# Patient Record
Sex: FEMALE | NOT HISPANIC OR LATINO
[De-identification: names, ages, dates, MRNs, and addresses within clinical notes are randomized per-mention and may not be internally consistent; named-entity substitution may affect disease eponyms.]

---

## 2017-01-17 ENCOUNTER — APPOINTMENT (OUTPATIENT)
Dept: CARDIOLOGY | Facility: CLINIC | Age: 77
End: 2017-01-17

## 2017-01-17 ENCOUNTER — NON-APPOINTMENT (OUTPATIENT)
Age: 77
End: 2017-01-17

## 2017-01-17 VITALS
HEIGHT: 72 IN | SYSTOLIC BLOOD PRESSURE: 144 MMHG | OXYGEN SATURATION: 97 % | HEART RATE: 56 BPM | DIASTOLIC BLOOD PRESSURE: 80 MMHG | BODY MASS INDEX: 21.4 KG/M2 | WEIGHT: 158 LBS

## 2017-01-17 VITALS — SYSTOLIC BLOOD PRESSURE: 138 MMHG | DIASTOLIC BLOOD PRESSURE: 78 MMHG

## 2017-01-18 LAB
25(OH)D3 SERPL-MCNC: 28.8 NG/ML
ANION GAP SERPL CALC-SCNC: 19 MMOL/L
BUN SERPL-MCNC: 16 MG/DL
CALCIUM SERPL-MCNC: 9.8 MG/DL
CHLORIDE SERPL-SCNC: 103 MMOL/L
CHOLEST SERPL-MCNC: 177 MG/DL
CHOLEST/HDLC SERPL: 4 RATIO
CO2 SERPL-SCNC: 20 MMOL/L
CREAT SERPL-MCNC: 0.87 MG/DL
GLUCOSE SERPL-MCNC: 100 MG/DL
HDLC SERPL-MCNC: 44 MG/DL
LDLC SERPL CALC-MCNC: 104 MG/DL
POTASSIUM SERPL-SCNC: 4.4 MMOL/L
SODIUM SERPL-SCNC: 142 MMOL/L
TRIGL SERPL-MCNC: 143 MG/DL
TSH SERPL-ACNC: 0.74 UIU/ML

## 2017-01-19 ENCOUNTER — MEDICATION RENEWAL (OUTPATIENT)
Age: 77
End: 2017-01-19

## 2017-02-11 ENCOUNTER — TRANSCRIPTION ENCOUNTER (OUTPATIENT)
Age: 77
End: 2017-02-11

## 2017-06-29 ENCOUNTER — MEDICATION RENEWAL (OUTPATIENT)
Age: 77
End: 2017-06-29

## 2017-08-08 ENCOUNTER — RESULT REVIEW (OUTPATIENT)
Age: 77
End: 2017-08-08

## 2018-08-16 ENCOUNTER — APPOINTMENT (OUTPATIENT)
Dept: CARDIOLOGY | Facility: CLINIC | Age: 78
End: 2018-08-16
Payer: MEDICARE

## 2018-08-16 ENCOUNTER — NON-APPOINTMENT (OUTPATIENT)
Age: 78
End: 2018-08-16

## 2018-08-16 VITALS
BODY MASS INDEX: 31.22 KG/M2 | DIASTOLIC BLOOD PRESSURE: 83 MMHG | SYSTOLIC BLOOD PRESSURE: 169 MMHG | HEIGHT: 60 IN | HEART RATE: 77 BPM | WEIGHT: 159 LBS | OXYGEN SATURATION: 98 %

## 2018-08-16 PROCEDURE — 99214 OFFICE O/P EST MOD 30 MIN: CPT

## 2018-08-16 PROCEDURE — 93000 ELECTROCARDIOGRAM COMPLETE: CPT

## 2018-09-10 ENCOUNTER — APPOINTMENT (OUTPATIENT)
Dept: CARDIOLOGY | Facility: CLINIC | Age: 78
End: 2018-09-10
Payer: MEDICARE

## 2018-09-10 PROCEDURE — 93306 TTE W/DOPPLER COMPLETE: CPT

## 2018-09-11 LAB
ANION GAP SERPL CALC-SCNC: 15 MMOL/L
BUN SERPL-MCNC: 15 MG/DL
CALCIUM SERPL-MCNC: 9.6 MG/DL
CHLORIDE SERPL-SCNC: 104 MMOL/L
CHOLEST SERPL-MCNC: 184 MG/DL
CHOLEST/HDLC SERPL: 4.5 RATIO
CO2 SERPL-SCNC: 24 MMOL/L
CREAT SERPL-MCNC: 0.62 MG/DL
GLUCOSE SERPL-MCNC: 115 MG/DL
HDLC SERPL-MCNC: 41 MG/DL
LDLC SERPL CALC-MCNC: 117 MG/DL
POTASSIUM SERPL-SCNC: 3.8 MMOL/L
SODIUM SERPL-SCNC: 143 MMOL/L
TRIGL SERPL-MCNC: 128 MG/DL

## 2018-11-13 LAB
CHOLEST SERPL-MCNC: 161 MG/DL
CHOLEST/HDLC SERPL: 3.8 RATIO
HDLC SERPL-MCNC: 42 MG/DL
LDLC SERPL CALC-MCNC: 94 MG/DL
TRIGL SERPL-MCNC: 125 MG/DL

## 2018-11-15 ENCOUNTER — APPOINTMENT (OUTPATIENT)
Dept: CARDIOLOGY | Facility: CLINIC | Age: 78
End: 2018-11-15
Payer: MEDICARE

## 2018-11-15 ENCOUNTER — NON-APPOINTMENT (OUTPATIENT)
Age: 78
End: 2018-11-15

## 2018-11-15 VITALS
DIASTOLIC BLOOD PRESSURE: 80 MMHG | HEIGHT: 60 IN | OXYGEN SATURATION: 96 % | SYSTOLIC BLOOD PRESSURE: 196 MMHG | BODY MASS INDEX: 31.02 KG/M2 | HEART RATE: 56 BPM | WEIGHT: 158 LBS

## 2018-11-15 PROCEDURE — 99214 OFFICE O/P EST MOD 30 MIN: CPT

## 2018-11-15 PROCEDURE — 93000 ELECTROCARDIOGRAM COMPLETE: CPT

## 2018-11-15 NOTE — DISCUSSION/SUMMARY
[FreeTextEntry1] : 78-year-old woman with a history as listed above who presents today for a followup cardiac evaluation.\par \par Yara is doing well overall. She denies any anginal symptoms. Clinically she is euvolemic on exam. She had a nuclear stress test on 7/2016 that did not show any ischemia. I don’t think she requires another ischemic evaluation at this time. \par \par She is bradycardic on exam but her heart rate is much better off of the Coreg. \par her blood pressure is elevated. She will continue Losartan 100mg Qday. She will continue with her  Norvasc  10 mg daily. Start Chlorthalidone 25mg Qday. She should get a BMP check in your office in 2-3 weeks. \par \par She is coronary calcifications. Her goal LDL should be less than 100. She will continue her current dose of simvastatin 40mg HS.  Her last lipid profile shows that she is at goal. 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 I would repeat an echo prior to her next visit to reassess this. \par  Exercise and diet counseling was performed in order to reduce her future cardiovascular risk. \par She will followup with me in 3 months time or sooner if necessary.

## 2018-11-15 NOTE — HISTORY OF PRESENT ILLNESS
[FreeTextEntry1] : 78-year-old woman with a history of anxiety, hypertension, hyperlipidemia who had presented to [facility] with a pneumonia recently. During her hospitalization she was found to have lateral T wave inversions on EKG which were worse than previously seen. She had echocardiogram that showed normal left ventricular function. Her cardiac enzymes were negative. she had a CAT scan of the chest which was also revealing for coronary artery calcifications and calcifications to the aorta and its main branches. she had nuclear stress test which was unremarkable.\par \par She was last here in 8/2018\par She now presents for a followup visit.\par Since her last visit she has been doing well overall.  \par She has been staying active. 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 Otherwise  she still lives a sedentary lifestyle.\par  She denies any anginal symptoms. She denies any dyspnea, chest pain, PND, orthopnea,  edema,  syncope, stroke like symptoms. She denies any blurry vision, headaches or recent stroke like symptoms. she's been compliant with her medications.  She recently was on a cruise and her diet was not good. She has been eating more sweets.

## 2018-11-15 NOTE — PHYSICAL EXAM
[Well Groomed] : well groomed [General Appearance - In No Acute Distress] : no acute distress [Normal Conjunctiva] : the conjunctiva exhibited no abnormalities [Eyelids - No Xanthelasma] : the eyelids demonstrated no xanthelasmas [Normal Oral Mucosa] : normal oral mucosa [No Oral Pallor] : no oral pallor [No Oral Cyanosis] : no oral cyanosis [Normal Jugular Venous A Waves Present] : normal jugular venous A waves present [Normal Jugular Venous V Waves Present] : normal jugular venous V waves present [No Jugular Venous Costello A Waves] : no jugular venous costello A waves [Respiration, Rhythm And Depth] : normal respiratory rhythm and effort [Exaggerated Use Of Accessory Muscles For Inspiration] : no accessory muscle use [Auscultation Breath Sounds / Voice Sounds] : lungs were clear to auscultation bilaterally [Abdomen Soft] : soft [Abdomen Tenderness] : non-tender [Abdomen Mass (___ Cm)] : no abdominal mass palpated [Abnormal Walk] : normal gait [Gait - Sufficient For Exercise Testing] : the gait was sufficient for exercise testing [Nail Clubbing] : no clubbing of the fingernails [Cyanosis, Localized] : no localized cyanosis [Petechial Hemorrhages (___cm)] : no petechial hemorrhages [Skin Color & Pigmentation] : normal skin color and pigmentation [] : no rash [No Venous Stasis] : no venous stasis [Skin Lesions] : no skin lesions [No Skin Ulcers] : no skin ulcer [No Xanthoma] : no  xanthoma was observed [Oriented To Time, Place, And Person] : oriented to person, place, and time [Affect] : the affect was normal [Mood] : the mood was normal [No Anxiety] : not feeling anxious [Normal Rate] : normal [Rhythm Regular] : regular [Normal S1] : normal S1 [Normal S2] : normal S2 [No Gallop] : no gallop heard [II] : a grade 2 [I] : a grade 1 [1+] : left 1+ [Right Carotid Bruit] : no bruit heard over the right carotid [Left Carotid Bruit] : no bruit heard over the left carotid [2+] : left 2+ [Bruit] : no bruit heard [No Pitting Edema] : no pitting edema present

## 2019-01-14 ENCOUNTER — NON-APPOINTMENT (OUTPATIENT)
Age: 79
End: 2019-01-14

## 2019-01-14 ENCOUNTER — APPOINTMENT (OUTPATIENT)
Dept: CARDIOLOGY | Facility: CLINIC | Age: 79
End: 2019-01-14
Payer: MEDICARE

## 2019-01-14 VITALS
BODY MASS INDEX: 31.02 KG/M2 | OXYGEN SATURATION: 97 % | HEIGHT: 60 IN | HEART RATE: 58 BPM | WEIGHT: 158 LBS | SYSTOLIC BLOOD PRESSURE: 152 MMHG | DIASTOLIC BLOOD PRESSURE: 66 MMHG

## 2019-01-14 PROCEDURE — 93000 ELECTROCARDIOGRAM COMPLETE: CPT

## 2019-01-14 PROCEDURE — 99214 OFFICE O/P EST MOD 30 MIN: CPT

## 2019-01-14 NOTE — HISTORY OF PRESENT ILLNESS
[FreeTextEntry1] : 78-year-old woman with a history of anxiety, hypertension, hyperlipidemia who had presented to [facility] with a pneumonia recently. During her hospitalization she was found to have lateral T wave inversions on EKG which were worse than previously seen. She had echocardiogram that showed normal left ventricular function. Her cardiac enzymes were negative. she had a CAT scan of the chest which was also revealing for coronary artery calcifications and calcifications to the aorta and its main branches. she had nuclear stress test which was unremarkable.\par \par She was last here in 8/2018\par She now presents for a followup visit.\par Since her last visit, she has been battling a viral bronchitis for 3 weeks. She is feeling weak but getting better. \par  She did not start the Chlorthalidone because she got nervous about taking a  water water pill.  \par  Recently she has had more of a sedentary lifestyle because of her illness. \par \par  She denies any anginal symptoms. She denies any dyspnea, chest pain, PND, orthopnea,  edema,  syncope, stroke like symptoms. She denies any blurry vision, headaches or recent stroke like symptoms. she's been compliant with her medications.  She is not keeping a good diets.

## 2019-01-14 NOTE — DISCUSSION/SUMMARY
[FreeTextEntry1] : 78-year-old woman with a history as listed above who presents today for a followup cardiac evaluation.\par \par Yara is doing well overall. She denies any anginal symptoms. Clinically she is euvolemic on exam. She had a nuclear stress test on 7/2016 that did not show any ischemia. I don’t think she requires another ischemic evaluation at this time. \par \par She is bradycardic on exam but her heart rate is much better off of the Coreg. \par her blood pressure is elevated likely because she has not started the Chlorthalidone and is not monitoring her diet.  She will continue Losartan 100mg Qday. She will continue with her  Norvasc  10 mg daily. Start Chlorthalidone 25mg Qday.  \par \par She is coronary calcifications. Her goal LDL should be less than 100. She will continue her current dose of simvastatin 40mg HS.  Her last lipid profile shows that she is at goal. 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 I would repeat an echo later this year. \par  Exercise and diet counseling was performed in order to reduce her future cardiovascular risk. \par \par She will followup with me in 3 months time or sooner if necessary.

## 2019-01-25 ENCOUNTER — EMERGENCY (EMERGENCY)
Facility: HOSPITAL | Age: 79
LOS: 1 days | Discharge: HOME | End: 2019-01-25
Attending: EMERGENCY MEDICINE | Admitting: EMERGENCY MEDICINE
Payer: MEDICARE

## 2019-01-25 VITALS
SYSTOLIC BLOOD PRESSURE: 131 MMHG | HEART RATE: 58 BPM | OXYGEN SATURATION: 97 % | RESPIRATION RATE: 16 BRPM | DIASTOLIC BLOOD PRESSURE: 64 MMHG | TEMPERATURE: 99 F

## 2019-01-25 VITALS
DIASTOLIC BLOOD PRESSURE: 60 MMHG | HEIGHT: 60 IN | WEIGHT: 158.07 LBS | OXYGEN SATURATION: 94 % | SYSTOLIC BLOOD PRESSURE: 100 MMHG | HEART RATE: 60 BPM | TEMPERATURE: 98 F

## 2019-01-25 DIAGNOSIS — Z98.89 OTHER SPECIFIED POSTPROCEDURAL STATES: Chronic | ICD-10-CM

## 2019-01-25 LAB
ALBUMIN SERPL ELPH-MCNC: 3.6 G/DL — SIGNIFICANT CHANGE UP (ref 3.3–5)
ALP SERPL-CCNC: 120 U/L — SIGNIFICANT CHANGE UP (ref 40–120)
ALT FLD-CCNC: 15 U/L — SIGNIFICANT CHANGE UP (ref 12–78)
ANION GAP SERPL CALC-SCNC: 10 MMOL/L — SIGNIFICANT CHANGE UP (ref 5–17)
APPEARANCE UR: CLEAR — SIGNIFICANT CHANGE UP
AST SERPL-CCNC: 15 U/L — SIGNIFICANT CHANGE UP (ref 15–37)
BASOPHILS # BLD AUTO: 0.08 K/UL — SIGNIFICANT CHANGE UP (ref 0–0.2)
BASOPHILS NFR BLD AUTO: 0.6 % — SIGNIFICANT CHANGE UP (ref 0–2)
BILIRUB SERPL-MCNC: 0.4 MG/DL — SIGNIFICANT CHANGE UP (ref 0.2–1.2)
BILIRUB UR-MCNC: NEGATIVE — SIGNIFICANT CHANGE UP
BUN SERPL-MCNC: 35 MG/DL — HIGH (ref 7–23)
CALCIUM SERPL-MCNC: 8.5 MG/DL — SIGNIFICANT CHANGE UP (ref 8.5–10.1)
CHLORIDE SERPL-SCNC: 102 MMOL/L — SIGNIFICANT CHANGE UP (ref 96–108)
CO2 SERPL-SCNC: 26 MMOL/L — SIGNIFICANT CHANGE UP (ref 22–31)
COLOR SPEC: SIGNIFICANT CHANGE UP
CREAT SERPL-MCNC: 1.6 MG/DL — HIGH (ref 0.5–1.3)
DIFF PNL FLD: NEGATIVE — SIGNIFICANT CHANGE UP
EOSINOPHIL # BLD AUTO: 0.15 K/UL — SIGNIFICANT CHANGE UP (ref 0–0.5)
EOSINOPHIL NFR BLD AUTO: 1.1 % — SIGNIFICANT CHANGE UP (ref 0–6)
GLUCOSE SERPL-MCNC: 141 MG/DL — HIGH (ref 70–99)
GLUCOSE UR QL: NEGATIVE — SIGNIFICANT CHANGE UP
HCT VFR BLD CALC: 41.3 % — SIGNIFICANT CHANGE UP (ref 34.5–45)
HGB BLD-MCNC: 13.6 G/DL — SIGNIFICANT CHANGE UP (ref 11.5–15.5)
IMM GRANULOCYTES NFR BLD AUTO: 0.6 % — SIGNIFICANT CHANGE UP (ref 0–1.5)
KETONES UR-MCNC: NEGATIVE — SIGNIFICANT CHANGE UP
LEUKOCYTE ESTERASE UR-ACNC: ABNORMAL
LYMPHOCYTES # BLD AUTO: 1.77 K/UL — SIGNIFICANT CHANGE UP (ref 1–3.3)
LYMPHOCYTES # BLD AUTO: 12.4 % — LOW (ref 13–44)
MCHC RBC-ENTMCNC: 30 PG — SIGNIFICANT CHANGE UP (ref 27–34)
MCHC RBC-ENTMCNC: 32.9 GM/DL — SIGNIFICANT CHANGE UP (ref 32–36)
MCV RBC AUTO: 91.2 FL — SIGNIFICANT CHANGE UP (ref 80–100)
MONOCYTES # BLD AUTO: 1.17 K/UL — HIGH (ref 0–0.9)
MONOCYTES NFR BLD AUTO: 8.2 % — SIGNIFICANT CHANGE UP (ref 2–14)
NEUTROPHILS # BLD AUTO: 10.97 K/UL — HIGH (ref 1.8–7.4)
NEUTROPHILS NFR BLD AUTO: 77.1 % — HIGH (ref 43–77)
NITRITE UR-MCNC: NEGATIVE — SIGNIFICANT CHANGE UP
PH UR: 5 — SIGNIFICANT CHANGE UP (ref 5–8)
PLATELET # BLD AUTO: 411 K/UL — HIGH (ref 150–400)
POTASSIUM SERPL-MCNC: 3.5 MMOL/L — SIGNIFICANT CHANGE UP (ref 3.5–5.3)
POTASSIUM SERPL-SCNC: 3.5 MMOL/L — SIGNIFICANT CHANGE UP (ref 3.5–5.3)
PROT SERPL-MCNC: 7.5 G/DL — SIGNIFICANT CHANGE UP (ref 6–8.3)
PROT UR-MCNC: NEGATIVE — SIGNIFICANT CHANGE UP
RBC # BLD: 4.53 M/UL — SIGNIFICANT CHANGE UP (ref 3.8–5.2)
RBC # FLD: 12.9 % — SIGNIFICANT CHANGE UP (ref 10.3–14.5)
SODIUM SERPL-SCNC: 138 MMOL/L — SIGNIFICANT CHANGE UP (ref 135–145)
SP GR SPEC: 1.01 — SIGNIFICANT CHANGE UP (ref 1.01–1.02)
TROPONIN I SERPL-MCNC: <.015 NG/ML — SIGNIFICANT CHANGE UP (ref 0.01–0.04)
UROBILINOGEN FLD QL: NEGATIVE — SIGNIFICANT CHANGE UP
WBC # BLD: 14.23 K/UL — HIGH (ref 3.8–10.5)
WBC # FLD AUTO: 14.23 K/UL — HIGH (ref 3.8–10.5)

## 2019-01-25 PROCEDURE — 80053 COMPREHEN METABOLIC PANEL: CPT

## 2019-01-25 PROCEDURE — 71046 X-RAY EXAM CHEST 2 VIEWS: CPT | Mod: 26

## 2019-01-25 PROCEDURE — 99285 EMERGENCY DEPT VISIT HI MDM: CPT

## 2019-01-25 PROCEDURE — 87186 SC STD MICRODIL/AGAR DIL: CPT

## 2019-01-25 PROCEDURE — 87086 URINE CULTURE/COLONY COUNT: CPT

## 2019-01-25 PROCEDURE — 81001 URINALYSIS AUTO W/SCOPE: CPT

## 2019-01-25 PROCEDURE — 84484 ASSAY OF TROPONIN QUANT: CPT

## 2019-01-25 PROCEDURE — 85027 COMPLETE CBC AUTOMATED: CPT

## 2019-01-25 PROCEDURE — 71046 X-RAY EXAM CHEST 2 VIEWS: CPT

## 2019-01-25 PROCEDURE — 99284 EMERGENCY DEPT VISIT MOD MDM: CPT | Mod: 25

## 2019-01-25 PROCEDURE — 36415 COLL VENOUS BLD VENIPUNCTURE: CPT

## 2019-01-25 PROCEDURE — 70450 CT HEAD/BRAIN W/O DYE: CPT

## 2019-01-25 PROCEDURE — 70450 CT HEAD/BRAIN W/O DYE: CPT | Mod: 26

## 2019-01-25 PROCEDURE — 93005 ELECTROCARDIOGRAM TRACING: CPT

## 2019-01-25 RX ORDER — LIDOCAINE 4 G/100G
1 CREAM TOPICAL ONCE
Qty: 0 | Refills: 0 | Status: COMPLETED | OUTPATIENT
Start: 2019-01-25 | End: 2019-01-25

## 2019-01-25 RX ORDER — SODIUM CHLORIDE 9 MG/ML
500 INJECTION INTRAMUSCULAR; INTRAVENOUS; SUBCUTANEOUS ONCE
Qty: 0 | Refills: 0 | Status: COMPLETED | OUTPATIENT
Start: 2019-01-25 | End: 2019-01-25

## 2019-01-25 RX ADMIN — SODIUM CHLORIDE 500 MILLILITER(S): 9 INJECTION INTRAMUSCULAR; INTRAVENOUS; SUBCUTANEOUS at 17:00

## 2019-01-25 RX ADMIN — SODIUM CHLORIDE 500 MILLILITER(S): 9 INJECTION INTRAMUSCULAR; INTRAVENOUS; SUBCUTANEOUS at 18:17

## 2019-01-25 RX ADMIN — LIDOCAINE 1 PATCH: 4 CREAM TOPICAL at 18:17

## 2019-01-25 NOTE — ED PROVIDER NOTE - OBJECTIVE STATEMENT
78 year old female with history of osteoporosis, HTN, HLD presents with weakness and dizziness that started around 1:30 pm today. denies spinning sensation, more of a lightheaded sensation. no chest pain or JESUS. felt like she was going to pass out, so slowly slid down to the floor. denies LOC or fall. denies hitting head. EMS was called. started to feel better in ambulance, not as dizzy but still mild weakness. continues to feel weak in ED, but overall much improved. started a "water pill" on Jan 19th by Dr. Johnson for HTN and believes is related. was put on a water pill a few years ago and had similar symptoms - fell and hit her head  PCP [facility]

## 2019-01-25 NOTE — ED PROVIDER NOTE - PROGRESS NOTE DETAILS
spoke with Dr. Herr (cards) regarding patient. recommends to discontinue the water pill and have close follow up in office for med adjustment. BUN and Cr elevated. was given a liter of fluids in ED and recommend to follow up with PCP and cardiology to recheck blood work. patient also states she has been drinking a lot of diet soda and tea, which I recommend to discontinue and drink more water.  Reevaluated patient at bedside.  Patient feeling much improved.  Discussed the results of all diagnostic testing in ED and copies of all reports given.   incidental finding of T12 compression fx discussed (unsure how old). referral to ortho provided. ambulatory with steady gait. An opportunity to ask questions was given.  Discussed the importance of prompt, close medical follow-up.  Patient will return with any changes, concerns or persistent / worsening symptoms.  Understanding of all instructions verbalized.

## 2019-01-25 NOTE — ED PROVIDER NOTE - ATTENDING CONTRIBUTION TO CARE
79 y/o F with c/o weakness/dizziness this afternoon.  denies chest pain, sob.  labs consistent with dehydration.  symptoms most likely secondary to Lasix for HTN.  pt improved and asymptomatic after IVFs.  pt comfortable with disposition home. ambulating without difficulty in the ED.  dc home.

## 2019-01-25 NOTE — ED PROVIDER NOTE - NEUROLOGICAL, MLM
Alert and oriented, no focal deficits, no motor or sensory deficits. symmetric eyebrow raise and smile. elevates tongue and shoulders without difficulty. normal finger to nose. good  strength bilaterally

## 2019-01-25 NOTE — ED PROVIDER NOTE - CARE PLAN
Principal Discharge DX:	Lightheadedness  Secondary Diagnosis:	Weakness  Secondary Diagnosis:	Dehydration

## 2019-01-25 NOTE — ED ADULT NURSE NOTE - OBJECTIVE STATEMENT
77 y/o F patient presents to ED from home c/o weakness x2 hours. As per patient she was at her friend's home when she felt sudden onset dizziness and had to sit on floor. Patient denies falls/head injuries/LOC. As per patient she was recently started on chlorthalidone on 1/19/19. Patient A&Ox3. lungs CTA. skin warm and intact. abdomen soft, non tender, non distended. ambulatory. Patient denies HA, dizziness, SOB, chest pain, abdominal pain, N/V/D, bowel/bladder changes, fevers/chills. Safety and comfort measures provided and maintained. MD bedside.

## 2019-01-25 NOTE — ED ADULT NURSE NOTE - NSIMPLEMENTINTERV_GEN_ALL_ED
Implemented All Universal Safety Interventions:  [place] to call system. Call bell, personal items and telephone within reach. Instruct patient to call for assistance. Room bathroom lighting operational. Non-slip footwear when patient is off stretcher. Physically safe environment: no spills, clutter or unnecessary equipment. Stretcher in lowest position, wheels locked, appropriate side rails in place.

## 2019-01-27 LAB
-  AMIKACIN: SIGNIFICANT CHANGE UP
-  AMPICILLIN/SULBACTAM: SIGNIFICANT CHANGE UP
-  AMPICILLIN: SIGNIFICANT CHANGE UP
-  AZTREONAM: SIGNIFICANT CHANGE UP
-  CEFEPIME: SIGNIFICANT CHANGE UP
-  CEFOXITIN: SIGNIFICANT CHANGE UP
-  CEFTRIAXONE: SIGNIFICANT CHANGE UP
-  CIPROFLOXACIN: SIGNIFICANT CHANGE UP
-  ERTAPENEM: SIGNIFICANT CHANGE UP
-  GENTAMICIN: SIGNIFICANT CHANGE UP
-  IMIPENEM: SIGNIFICANT CHANGE UP
-  LEVOFLOXACIN: SIGNIFICANT CHANGE UP
-  MEROPENEM: SIGNIFICANT CHANGE UP
-  NITROFURANTOIN: SIGNIFICANT CHANGE UP
-  PIPERACILLIN/TAZOBACTAM: SIGNIFICANT CHANGE UP
-  TIGECYCLINE: SIGNIFICANT CHANGE UP
-  TOBRAMYCIN: SIGNIFICANT CHANGE UP
-  TRIMETHOPRIM/SULFAMETHOXAZOLE: SIGNIFICANT CHANGE UP
CULTURE RESULTS: SIGNIFICANT CHANGE UP
METHOD TYPE: SIGNIFICANT CHANGE UP
ORGANISM # SPEC MICROSCOPIC CNT: SIGNIFICANT CHANGE UP
ORGANISM # SPEC MICROSCOPIC CNT: SIGNIFICANT CHANGE UP
SPECIMEN SOURCE: SIGNIFICANT CHANGE UP

## 2019-02-05 ENCOUNTER — APPOINTMENT (OUTPATIENT)
Dept: CARDIOLOGY | Facility: CLINIC | Age: 79
End: 2019-02-05

## 2019-04-16 ENCOUNTER — NON-APPOINTMENT (OUTPATIENT)
Age: 79
End: 2019-04-16

## 2019-04-16 ENCOUNTER — APPOINTMENT (OUTPATIENT)
Dept: CARDIOLOGY | Facility: CLINIC | Age: 79
End: 2019-04-16
Payer: MEDICARE

## 2019-04-16 VITALS
HEIGHT: 60 IN | SYSTOLIC BLOOD PRESSURE: 159 MMHG | WEIGHT: 152 LBS | DIASTOLIC BLOOD PRESSURE: 78 MMHG | HEART RATE: 57 BPM | BODY MASS INDEX: 29.84 KG/M2 | OXYGEN SATURATION: 95 %

## 2019-04-16 PROCEDURE — 93000 ELECTROCARDIOGRAM COMPLETE: CPT

## 2019-04-16 PROCEDURE — 99214 OFFICE O/P EST MOD 30 MIN: CPT

## 2019-04-16 RX ORDER — CHLORTHALIDONE 25 MG/1
25 TABLET ORAL DAILY
Qty: 30 | Refills: 5 | Status: DISCONTINUED | COMMUNITY
Start: 2018-11-15 | End: 2019-04-16

## 2019-04-16 NOTE — PHYSICAL EXAM
[Well Groomed] : well groomed [General Appearance - In No Acute Distress] : no acute distress [Normal Conjunctiva] : the conjunctiva exhibited no abnormalities [Eyelids - No Xanthelasma] : the eyelids demonstrated no xanthelasmas [Normal Oral Mucosa] : normal oral mucosa [No Oral Pallor] : no oral pallor [No Oral Cyanosis] : no oral cyanosis [Normal Jugular Venous A Waves Present] : normal jugular venous A waves present [Normal Jugular Venous V Waves Present] : normal jugular venous V waves present [No Jugular Venous Costello A Waves] : no jugular venous costello A waves [Exaggerated Use Of Accessory Muscles For Inspiration] : no accessory muscle use [Respiration, Rhythm And Depth] : normal respiratory rhythm and effort [Auscultation Breath Sounds / Voice Sounds] : lungs were clear to auscultation bilaterally [Abdomen Soft] : soft [Abdomen Tenderness] : non-tender [Abdomen Mass (___ Cm)] : no abdominal mass palpated [Abnormal Walk] : normal gait [Gait - Sufficient For Exercise Testing] : the gait was sufficient for exercise testing [Nail Clubbing] : no clubbing of the fingernails [Cyanosis, Localized] : no localized cyanosis [Petechial Hemorrhages (___cm)] : no petechial hemorrhages [Skin Color & Pigmentation] : normal skin color and pigmentation [No Venous Stasis] : no venous stasis [] : no rash [Skin Lesions] : no skin lesions [No Skin Ulcers] : no skin ulcer [No Xanthoma] : no  xanthoma was observed [Oriented To Time, Place, And Person] : oriented to person, place, and time [Affect] : the affect was normal [Mood] : the mood was normal [No Anxiety] : not feeling anxious [Normal Rate] : normal [Rhythm Regular] : regular [Normal S1] : normal S1 [Normal S2] : normal S2 [No Gallop] : no gallop heard [II] : a grade 2 [I] : a grade 1 [1+] : left 1+ [Right Carotid Bruit] : no bruit heard over the right carotid [Left Carotid Bruit] : no bruit heard over the left carotid [2+] : left 2+ [Bruit] : no bruit heard [No Pitting Edema] : no pitting edema present
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
## 2019-04-16 NOTE — DISCUSSION/SUMMARY
[FreeTextEntry1] : 78-year-old woman with a history as listed above who presents today for a followup cardiac evaluation.\par \par Yara is doing well overall. She denies any anginal symptoms. Clinically she is euvolemic on exam. She had a nuclear stress test on 7/2016 that did not show any ischemia. I don’t think she requires another ischemic evaluation at this time. \par \par She is bradycardic on exam but her heart rate is much better off of the Coreg. \par her blood pressure is elevated still. She did not tolerate the diuretic as it seems to lead her to have syncopal episodes likely from dehydration.  She will continue Losartan 100mg Qday. She will continue with her  Norvasc  10 mg daily. She does not want to add another agent. I think if she is still hypertensive in the future she should start on hydralazine 25mg q12. \par \par She is coronary calcifications. Her goal LDL should be less than 100. She will continue her current dose of simvastatin 40mg HS.  Her last lipid profile shows that she is at goal. 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 I would repeat an echo after the next visit. \par  Exercise and diet counseling was performed in order to reduce her future cardiovascular risk. \par \par She will followup with me in 3 months time or sooner if necessary. She will be establishing care with a new PMD soon.

## 2019-05-06 ENCOUNTER — LABORATORY RESULT (OUTPATIENT)
Age: 79
End: 2019-05-06

## 2019-05-06 ENCOUNTER — APPOINTMENT (OUTPATIENT)
Dept: INTERNAL MEDICINE | Facility: CLINIC | Age: 79
End: 2019-05-06
Payer: MEDICARE

## 2019-05-06 ENCOUNTER — NON-APPOINTMENT (OUTPATIENT)
Age: 79
End: 2019-05-06

## 2019-05-06 VITALS
BODY MASS INDEX: 29.27 KG/M2 | TEMPERATURE: 98.1 F | HEART RATE: 72 BPM | RESPIRATION RATE: 16 BRPM | SYSTOLIC BLOOD PRESSURE: 160 MMHG | HEIGHT: 61 IN | OXYGEN SATURATION: 98 % | WEIGHT: 155 LBS | DIASTOLIC BLOOD PRESSURE: 90 MMHG

## 2019-05-06 DIAGNOSIS — K90.0 CELIAC DISEASE: ICD-10-CM

## 2019-05-06 DIAGNOSIS — Z63.4 DISAPPEARANCE AND DEATH OF FAMILY MEMBER: ICD-10-CM

## 2019-05-06 DIAGNOSIS — Z85.828 PERSONAL HISTORY OF OTHER MALIGNANT NEOPLASM OF SKIN: ICD-10-CM

## 2019-05-06 DIAGNOSIS — Z83.3 FAMILY HISTORY OF DIABETES MELLITUS: ICD-10-CM

## 2019-05-06 DIAGNOSIS — Z85.850 PERSONAL HISTORY OF MALIGNANT NEOPLASM OF THYROID: ICD-10-CM

## 2019-05-06 PROCEDURE — 81003 URINALYSIS AUTO W/O SCOPE: CPT | Mod: QW

## 2019-05-06 PROCEDURE — 94010 BREATHING CAPACITY TEST: CPT

## 2019-05-06 PROCEDURE — 93000 ELECTROCARDIOGRAM COMPLETE: CPT

## 2019-05-06 PROCEDURE — G0439: CPT

## 2019-05-06 PROCEDURE — 36415 COLL VENOUS BLD VENIPUNCTURE: CPT

## 2019-05-06 SDOH — SOCIAL STABILITY - SOCIAL INSECURITY: DISSAPEARANCE AND DEATH OF FAMILY MEMBER: Z63.4

## 2019-05-06 NOTE — PLAN
[FreeTextEntry1] : ENT\par decreased hearing-check OAE- abnormal bilaterally-referred to ENT specialist\par Pulmonary\par mild bronchitis-start Levofloxacin 250mg p.o.q.d. for 10 days-patient to follow up in 1 week \par dyspnea on exertion - check EKG/PFT - likely secondary to lack of CV exercise and mild bronchitis/ obesity\par Cardiology\par hypertension-continue Amlodipine 10mg p.o.q.d., and Losartan 100mg p.o.q.d. - continue low sodium diet\par abnormal EKG-check EKG-shows sinus rhythm at 65 BPM, nonspecific, T-wave abnormalities, unchanged from prior EKGs.\par continue Aspirin 325mg p.o.q.d.\par Endocrinology\par hyperlipidemia-continue Simvastatin 40mg p.o.q.d. - continue low cholesterol/low fat diet - check FLP \par obesity-advised low carbohydrate diet/increased CV exercise\par Psychiatry\par anxiety/depression-follow up with psychiatrist, Dr. Thibodeaux\par Musculoskeletal\par osteoporosis-follow up with endocrinologist, Dr Zambrano. for Prolia injections every 6 months\par Gastroenterology\par GERD-continue Prilosec 20mg p.o.q.d. - continue antireflux measures\par Urology\par possible UTI-start Levofloxacin 250mg p.o.q.d. for 10 days-follow up in 1 week for repeat blood work up/UA\par Hematology\par anemia-continue Ferrous Sulfate 65mg p.o.q.d.\par vitamin D deficiency-continue vitamin D 1000 unit tablets p.o.q.d.\par continue Calcium/Vitamin D tablets p.o.q.d.\par \par check EKG,  check PFT, shows moderate restriction, likely secondary to obesity.\par check female panel, thyroid panel, UA, abnormal  \par Patient to follow up in 1 week for follow up of bronchitis, UA , and blood pressure check.

## 2019-05-06 NOTE — REVIEW OF SYSTEMS
[Cough] : cough [Incontinence] : incontinence [Dyspnea on Exertion] : dyspnea on exertion [Anxiety] : anxiety [Depression] : depression [Negative] : Heme/Lymph [Wheezing] : wheezing

## 2019-05-06 NOTE — ASSESSMENT
[FreeTextEntry1] : New patient is a 78 year year old female with a past medical history as above who presents for annual physical exam.\par

## 2019-05-06 NOTE — HISTORY OF PRESENT ILLNESS
[FreeTextEntry1] : new patient annual physical exam\par  [de-identified] : New patient is a 78 year year old female with a past medical history as below who presents for annual physical exam. Patient states she is taking all medications as prescribed and denies any adverse reactions or side effects. She denies myalgias or arthralgias on Simvastatin. She denies lightheadedness/dizziness on Losartan. Patient notes a recent US of heart revealing valve abnormalities that are being monitored by Dr. Johnson. She notes seeing endocrinologist Dr. Zambrano every 6 months for Prolia injections given history of osteoporosis. Patient states she has not seen her gynecologist, Dr. Delcid recently. Last mammogram was several years ago. Patient notes having a colonoscopy 3-4 years ago that revealed celiac disease. Patient notes wearing a pad for urinary incontinence, but denies any dysuria. Patient states she has been seeing psychiatrist, Dr. Thibodeaux for anxiety/depression. She notes discontinuing medication in the past due to adverse reactions. Patient notes a cough that began last week. She also notes wheezing and expectorating white mucus, but denies coughing up blood. Patient denies any issues with vision or hearing. Patient notes being hospitalized in the past for an adverse reaction to a Flu/Pneumonia vaccine. Patient notes using upper and lower dentures.\par

## 2019-05-06 NOTE — PHYSICAL EXAM
[No Acute Distress] : no acute distress [Well Developed] : well developed [Well Nourished] : well nourished [Well-Appearing] : well-appearing [PERRL] : pupils equal round and reactive to light [Normal Sclera/Conjunctiva] : normal sclera/conjunctiva [EOMI] : extraocular movements intact [Normal Oropharynx] : the oropharynx was normal [Normal Outer Ear/Nose] : the outer ears and nose were normal in appearance [No JVD] : no jugular venous distention [Supple] : supple [No Lymphadenopathy] : no lymphadenopathy [No Respiratory Distress] : no respiratory distress  [Thyroid Normal, No Nodules] : the thyroid was normal and there were no nodules present [Clear to Auscultation] : lungs were clear to auscultation bilaterally [Normal Rate] : normal rate  [No Accessory Muscle Use] : no accessory muscle use [Regular Rhythm] : with a regular rhythm [Normal S1, S2] : normal S1 and S2 [No Murmur] : no murmur heard [No Carotid Bruits] : no carotid bruits [No Abdominal Bruit] : a ~M bruit was not heard ~T in the abdomen [Pedal Pulses Present] : the pedal pulses are present [No Varicosities] : no varicosities [No Edema] : there was no peripheral edema [No Palpable Aorta] : no palpable aorta [No Extremity Clubbing/Cyanosis] : no extremity clubbing/cyanosis [Non-distended] : non-distended [Non Tender] : non-tender [Soft] : abdomen soft [No HSM] : no HSM [No Masses] : no abdominal mass palpated [Normal Bowel Sounds] : normal bowel sounds [Normal Anterior Cervical Nodes] : no anterior cervical lymphadenopathy [Normal Posterior Cervical Nodes] : no posterior cervical lymphadenopathy [No CVA Tenderness] : no CVA  tenderness [No Spinal Tenderness] : no spinal tenderness [No Rash] : no rash [Grossly Normal Strength/Tone] : grossly normal strength/tone [No Joint Swelling] : no joint swelling [No Focal Deficits] : no focal deficits [Normal Gait] : normal gait [Coordination Grossly Intact] : coordination grossly intact [Normal Insight/Judgement] : insight and judgment were intact [Deep Tendon Reflexes (DTR)] : deep tendon reflexes were 2+ and symmetric [Normal Affect] : the affect was normal [de-identified] : mucus  [de-identified] : upper airway rhonchi  [de-identified] : obese

## 2019-05-06 NOTE — ADDENDUM
[FreeTextEntry1] : I, Kevin Castaneda, acted solely as scribe for Dr. Edu Boston DO on this date 05/06/2019 12:00PM .\par \par All medical record entries made by the Scribe were at my, Dr. Edu Boston DO direction and personally dictated by me on 05/06/2019 12:00PM. I have reviewed the chart and agree that the record accurately reflects my personal performance of the history, physical exam, assessment and plan. I have also personally directed, reviewed and agreed with the chart.\par

## 2019-05-06 NOTE — DATA REVIEWED
[FreeTextEntry1] : EKG shows sinus rhythm at 65 BPM, nonspecific, T-wave abnormalities, unchanged from prior EKGs.\par PFT shows moderate restriction, likely secondary to obesity. \par OAE abnormal bilaterally.\par

## 2019-05-13 ENCOUNTER — APPOINTMENT (OUTPATIENT)
Dept: INTERNAL MEDICINE | Facility: CLINIC | Age: 79
End: 2019-05-13
Payer: MEDICARE

## 2019-05-13 VITALS
OXYGEN SATURATION: 98 % | SYSTOLIC BLOOD PRESSURE: 156 MMHG | HEART RATE: 66 BPM | BODY MASS INDEX: 29.9 KG/M2 | HEIGHT: 61 IN | RESPIRATION RATE: 16 BRPM | DIASTOLIC BLOOD PRESSURE: 68 MMHG | WEIGHT: 158.38 LBS

## 2019-05-13 VITALS — DIASTOLIC BLOOD PRESSURE: 70 MMHG | SYSTOLIC BLOOD PRESSURE: 144 MMHG

## 2019-05-13 PROCEDURE — 99213 OFFICE O/P EST LOW 20 MIN: CPT | Mod: 25

## 2019-05-13 NOTE — ASSESSMENT
[FreeTextEntry1] : Patient is a 78 year old female with a past medical history as above who presents for blood pressure check and general follow-up.

## 2019-05-13 NOTE — HISTORY OF PRESENT ILLNESS
[de-identified] : Patient is a 78 year old female with a past medical history as below who presents for blood pressure check and general follow-up. Patient states she is taking all medications as prescribed and denies any adverse reactions or side effects. She denies lightheadedness/dizziness on Losartan. She denies any new myalgias/arthralgias on Simvastatin. She states bronchitis has been resolved on Levofloxacin and notes she is no longer coughing. UTI has been resolved as last UA was normal. Patient notes being hospitalized in the past after being started on a water pill. Patient states she feels well overall with no new complaints.  [FreeTextEntry1] : blood pressure check and general follow-up

## 2019-05-13 NOTE — PLAN
[FreeTextEntry1] : Pulmonary\par mild bronchitis-resolved -finish Levofloxacin 250mg p.o.q.d.-has 3 additional days\par Cardiology\par hypertension-continue Losartan 100mg p.o.q.d. and  Amlodipine Besylate 10mg p.o.q.d. - continue low sodium diet\par continue Aspirin 325mg p.o.q.d.\par Endocrinology\par hyperlipidemia-continue Simvastatin 40mg p.o.q.h.s. - continue low cholesterol/low fat diet \par obesity-continue low carbohydrate/low sugar diet and CV exercise\par Gastroenterology\par GERD-continue Omeprazole 20mg p.o.q.d. - continue antireflux measures\par Urology\par UTI-resolved -check UA, normal-finish Levofloxacin 250mg p.o.q.d.\par Hematology\par vitamin D deficiency-continue vitamin D 1000 unit tablets p.o.q.d.\par \par Patient to follow up in 3 months for fasting blood work. \par \par \par

## 2019-05-13 NOTE — ADDENDUM
[FreeTextEntry1] : I, Kevin Castaneda, acted solely as scribe for Dr. Edu Boston DO on this date 05/13/2019  9:40AM .\par \par All medical record entries made by the Scribe were at my, Dr. Edu Boston DO direction and personally dictated by me on 05/13/2019  9:40AM. I have reviewed the chart and agree that the record accurately reflects my personal performance of the history, physical exam, assessment and plan. I have also personally directed, reviewed and agreed with the chart.\par

## 2019-05-14 LAB
25(OH)D3 SERPL-MCNC: 34.5 NG/ML
ALBUMIN SERPL ELPH-MCNC: 4.1 G/DL
ALP BLD-CCNC: 111 U/L
ALT SERPL-CCNC: 15 U/L
ANION GAP SERPL CALC-SCNC: 12 MMOL/L
APPEARANCE: ABNORMAL
AST SERPL-CCNC: 20 U/L
BASOPHILS # BLD AUTO: 0.09 K/UL
BASOPHILS NFR BLD AUTO: 0.8 %
BILIRUB SERPL-MCNC: 0.4 MG/DL
BILIRUBIN URINE: NEGATIVE
BLOOD URINE: ABNORMAL
BUN SERPL-MCNC: 14 MG/DL
CALCIUM SERPL-MCNC: 9.7 MG/DL
CHLORIDE SERPL-SCNC: 99 MMOL/L
CHOLEST SERPL-MCNC: 201 MG/DL
CHOLEST/HDLC SERPL: 4.8 RATIO
CO2 SERPL-SCNC: 28 MMOL/L
COLOR: YELLOW
CREAT SERPL-MCNC: 0.97 MG/DL
EOSINOPHIL # BLD AUTO: 0.25 K/UL
EOSINOPHIL NFR BLD AUTO: 2.3 %
ESTIMATED AVERAGE GLUCOSE: 131 MG/DL
GLUCOSE QUALITATIVE U: NEGATIVE
GLUCOSE SERPL-MCNC: 114 MG/DL
HBA1C MFR BLD HPLC: 6.2 %
HCT VFR BLD CALC: 43.8 %
HDLC SERPL-MCNC: 42 MG/DL
HGB BLD-MCNC: 13.4 G/DL
IMM GRANULOCYTES NFR BLD AUTO: 0.5 %
KETONES URINE: NEGATIVE
LDLC SERPL CALC-MCNC: 130 MG/DL
LEUKOCYTE ESTERASE URINE: ABNORMAL
LYMPHOCYTES # BLD AUTO: 1.84 K/UL
LYMPHOCYTES NFR BLD AUTO: 16.6 %
MAN DIFF?: NORMAL
MCHC RBC-ENTMCNC: 29.6 PG
MCHC RBC-ENTMCNC: 30.6 GM/DL
MCV RBC AUTO: 96.9 FL
MONOCYTES # BLD AUTO: 0.95 K/UL
MONOCYTES NFR BLD AUTO: 8.6 %
NEUTROPHILS # BLD AUTO: 7.92 K/UL
NEUTROPHILS NFR BLD AUTO: 71.2 %
NITRITE URINE: POSITIVE
PH URINE: 5.5
PLATELET # BLD AUTO: 417 K/UL
POTASSIUM SERPL-SCNC: 4 MMOL/L
PROT SERPL-MCNC: 7.2 G/DL
PROTEIN URINE: NORMAL
RBC # BLD: 4.52 M/UL
RBC # FLD: 13.8 %
SODIUM SERPL-SCNC: 139 MMOL/L
SPECIFIC GRAVITY URINE: 1.02
T3 SERPL-MCNC: 119 NG/DL
T3FREE SERPL-MCNC: 2.94 PG/ML
T3RU NFR SERPL: 1.1 TBI
T4 FREE SERPL-MCNC: 1.2 NG/DL
T4 SERPL-MCNC: 7.9 UG/DL
THYROGLOB AB SERPL-ACNC: <20 IU/ML
THYROPEROXIDASE AB SERPL IA-ACNC: <10 IU/ML
TRIGL SERPL-MCNC: 144 MG/DL
TSH SERPL-ACNC: 1.66 UIU/ML
UROBILINOGEN URINE: NORMAL
WBC # FLD AUTO: 11.11 K/UL

## 2019-05-30 ENCOUNTER — APPOINTMENT (OUTPATIENT)
Dept: INTERNAL MEDICINE | Facility: CLINIC | Age: 79
End: 2019-05-30
Payer: MEDICARE

## 2019-05-30 VITALS
SYSTOLIC BLOOD PRESSURE: 130 MMHG | HEIGHT: 61 IN | DIASTOLIC BLOOD PRESSURE: 80 MMHG | HEART RATE: 72 BPM | TEMPERATURE: 98.7 F | RESPIRATION RATE: 16 BRPM | BODY MASS INDEX: 29.83 KG/M2 | WEIGHT: 158 LBS | OXYGEN SATURATION: 98 %

## 2019-05-30 PROCEDURE — 99214 OFFICE O/P EST MOD 30 MIN: CPT | Mod: 25

## 2019-05-30 PROCEDURE — 36415 COLL VENOUS BLD VENIPUNCTURE: CPT

## 2019-05-30 NOTE — ASSESSMENT
[FreeTextEntry1] : Patient is a 78 year old female with a past medical history as above who presents for non-fasting blood work and general follow-up.\par

## 2019-05-30 NOTE — HISTORY OF PRESENT ILLNESS
[FreeTextEntry1] : non-fasting blood work and general follow-up\par  [de-identified] : Patient is a 78 year old female with a past medical history as below who presents for non-fasting blood work and general follow-up. Previous blood work revealed elevated WBC (11.11) possibly secondary to recent UTI and bronchitis, and elevated hemoglobin  A1C. She states she consumes a high carbohydrate/high sugar diet, but tries to exercise regularly (walking). Patient notes joint pain and muscle pain likely secondary to osteoporosis. She states her last Prolia injection was on 2/7/19 with endocrinologist, Dr. Yaritza Lo. Recent bone density testing revealed worsening osteoporosis, particularly around the hip. She was instructed to follow up with Dr. Lo this August. Patient states she is not currently taking any oral medication for osteoporosis or the pain. Patient notes being hospitalized on 1/25/19 after taking a water pill. She notes sees psychiatrist Dr. Thibodeaux and requests her records be sent over to him.

## 2019-05-30 NOTE — PHYSICAL EXAM

## 2019-05-30 NOTE — PLAN
[FreeTextEntry1] : Hematology\par leukocytosis - possibly due to recent UTI and bronchitis - check CBC\par Cardiology\par hypertension-continue Losartan Potassium 50mg BID p.o.q.d. - continue low sodium diet\par continue Aspirin 325mg p.o.q.d. given history of CAD\par Endocrinology\par hyperlipidemia-continue Simvastatin 40mg p.o.q.h.s. - continue low cholesterol/low fat diet\par pre-diabetes (hyperglycemia) - advised low carbohydrate/low sugar diet and increased CV exercise \par obesity-advised low carbohydrate diet and increased CV exercise\par vitamin D deficiency- continue vitamin D 1000 unit tablets p.o.q.d.\par Gastroenterology\par GERD-continue Omeprazole 20mg p.o.q.d. - continue antireflux measures\par Urology\par previously abnormal UA/recent UTI  - check UA \par \par check CBC, UA

## 2019-05-30 NOTE — ADDENDUM
[FreeTextEntry1] : I, Kevin Castaneda, acted solely as scribe for Dr. Edu Boston DO on this date 05/30/2019  9:00AM .\par \par All medical record entries made by the Scribe were at my, Dr. Edu Boston DO direction and personally dictated by me on 05/30/2019  9:00AM. I have reviewed the chart and agree that the record accurately reflects my personal performance of the history, physical exam, assessment and plan. I have also personally directed, reviewed and agreed with the chart.\par

## 2019-06-20 LAB
BASOPHILS # BLD AUTO: 0.08 K/UL
BASOPHILS NFR BLD AUTO: 1 %
EOSINOPHIL # BLD AUTO: 0.34 K/UL
EOSINOPHIL NFR BLD AUTO: 4.1 %
HCT VFR BLD CALC: 45 %
HGB BLD-MCNC: 13.6 G/DL
IMM GRANULOCYTES NFR BLD AUTO: 0.2 %
LYMPHOCYTES # BLD AUTO: 1.99 K/UL
LYMPHOCYTES NFR BLD AUTO: 24 %
MAN DIFF?: NORMAL
MCHC RBC-ENTMCNC: 29.6 PG
MCHC RBC-ENTMCNC: 30.2 GM/DL
MCV RBC AUTO: 98 FL
MONOCYTES # BLD AUTO: 0.79 K/UL
MONOCYTES NFR BLD AUTO: 9.5 %
NEUTROPHILS # BLD AUTO: 5.06 K/UL
NEUTROPHILS NFR BLD AUTO: 61.2 %
PLATELET # BLD AUTO: 415 K/UL
RBC # BLD: 4.59 M/UL
RBC # FLD: 13.8 %
WBC # FLD AUTO: 8.28 K/UL

## 2019-06-27 ENCOUNTER — MEDICATION RENEWAL (OUTPATIENT)
Age: 79
End: 2019-06-27

## 2019-07-09 ENCOUNTER — NON-APPOINTMENT (OUTPATIENT)
Age: 79
End: 2019-07-09

## 2019-07-09 ENCOUNTER — APPOINTMENT (OUTPATIENT)
Dept: CARDIOLOGY | Facility: CLINIC | Age: 79
End: 2019-07-09
Payer: MEDICARE

## 2019-07-09 VITALS
HEART RATE: 50 BPM | OXYGEN SATURATION: 96 % | HEIGHT: 61 IN | DIASTOLIC BLOOD PRESSURE: 74 MMHG | WEIGHT: 159 LBS | SYSTOLIC BLOOD PRESSURE: 145 MMHG | BODY MASS INDEX: 30.02 KG/M2

## 2019-07-09 DIAGNOSIS — I35.1 NONRHEUMATIC AORTIC (VALVE) INSUFFICIENCY: ICD-10-CM

## 2019-07-09 PROCEDURE — 93000 ELECTROCARDIOGRAM COMPLETE: CPT

## 2019-07-09 PROCEDURE — 99214 OFFICE O/P EST MOD 30 MIN: CPT

## 2019-07-09 NOTE — PHYSICAL EXAM
[Well Groomed] : well groomed [General Appearance - In No Acute Distress] : no acute distress [Normal Conjunctiva] : the conjunctiva exhibited no abnormalities [Eyelids - No Xanthelasma] : the eyelids demonstrated no xanthelasmas [Normal Oral Mucosa] : normal oral mucosa [No Oral Pallor] : no oral pallor [No Oral Cyanosis] : no oral cyanosis [Normal Jugular Venous A Waves Present] : normal jugular venous A waves present [Normal Jugular Venous V Waves Present] : normal jugular venous V waves present [No Jugular Venous Costello A Waves] : no jugular venous costello A waves [Respiration, Rhythm And Depth] : normal respiratory rhythm and effort [Exaggerated Use Of Accessory Muscles For Inspiration] : no accessory muscle use [Auscultation Breath Sounds / Voice Sounds] : lungs were clear to auscultation bilaterally [Abdomen Soft] : soft [Abdomen Tenderness] : non-tender [Abdomen Mass (___ Cm)] : no abdominal mass palpated [Abnormal Walk] : normal gait [Gait - Sufficient For Exercise Testing] : the gait was sufficient for exercise testing [Nail Clubbing] : no clubbing of the fingernails [Cyanosis, Localized] : no localized cyanosis [Petechial Hemorrhages (___cm)] : no petechial hemorrhages [Skin Color & Pigmentation] : normal skin color and pigmentation [] : no rash [No Venous Stasis] : no venous stasis [Skin Lesions] : no skin lesions [No Skin Ulcers] : no skin ulcer [No Xanthoma] : no  xanthoma was observed [Oriented To Time, Place, And Person] : oriented to person, place, and time [Affect] : the affect was normal [Mood] : the mood was normal [No Anxiety] : not feeling anxious [Normal Rate] : normal [Rhythm Regular] : regular [Normal S1] : normal S1 [Normal S2] : normal S2 [No Gallop] : no gallop heard [I] : a grade 1 [II] : a grade 2 [1+] : left 1+ [Left Carotid Bruit] : no bruit heard over the left carotid [Right Carotid Bruit] : no bruit heard over the right carotid [2+] : left 2+ [Bruit] : no bruit heard [No Pitting Edema] : no pitting edema present
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
## 2019-07-09 NOTE — DISCUSSION/SUMMARY
[FreeTextEntry1] : 78-year-old woman with a history as listed above who presents today for a followup cardiac evaluation.\par \par Yara is doing well overall. She denies any anginal symptoms. Clinically she is euvolemic on exam. She had a nuclear stress test on 7/2016 that did not show any ischemia. I don’t think she requires another ischemic evaluation at this time. \par \par She is bradycardic on exam but her heart rate is much better off of the Coreg. \par her blood pressure is elevated still. She will continue Losartan 50mg q12. She will continue with her  Norvasc  10 mg daily. \par She does not want to add another agent. Risk of remaining hypertensive were fully explained. She understands and want to continue to try lifestyle modification.  I think if she is still hypertensive in the future she should start on hydralazine 25mg q12. \par \par She is coronary calcifications. Her goal LDL should be less than 100. She will continue her current dose of simvastatin 40mg HS.  Her last lipid profile shows that she is at goal. 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 She will get a 2d echo to assess for any  new structural heart disease, changes in valvular and ventricular function. \par  Exercise and diet counseling was performed in order to reduce her future cardiovascular risk. \par \par She will followup with me in 3 months time or sooner if necessary. She will be establishing care with a new PMD soon.

## 2019-08-12 ENCOUNTER — APPOINTMENT (OUTPATIENT)
Dept: INTERNAL MEDICINE | Facility: CLINIC | Age: 79
End: 2019-08-12
Payer: MEDICARE

## 2019-08-12 VITALS
TEMPERATURE: 98.4 F | DIASTOLIC BLOOD PRESSURE: 74 MMHG | SYSTOLIC BLOOD PRESSURE: 168 MMHG | OXYGEN SATURATION: 98 % | HEART RATE: 61 BPM | RESPIRATION RATE: 16 BRPM

## 2019-08-12 VITALS
SYSTOLIC BLOOD PRESSURE: 180 MMHG | HEIGHT: 61 IN | BODY MASS INDEX: 31.2 KG/M2 | DIASTOLIC BLOOD PRESSURE: 86 MMHG | WEIGHT: 165.25 LBS

## 2019-08-12 VITALS — DIASTOLIC BLOOD PRESSURE: 78 MMHG | SYSTOLIC BLOOD PRESSURE: 144 MMHG

## 2019-08-12 PROCEDURE — 99214 OFFICE O/P EST MOD 30 MIN: CPT | Mod: 25

## 2019-08-12 PROCEDURE — 36415 COLL VENOUS BLD VENIPUNCTURE: CPT

## 2019-08-12 NOTE — ASSESSMENT
[FreeTextEntry1] : Patient is a 78 year old female with a past medical history as above who presents for fasting blood work, blood pressure check, and general follow-up.

## 2019-08-12 NOTE — ADDENDUM
[FreeTextEntry1] : I, Kevin Castaneda, acted solely as scribe for Dr. Edu Boston DO on this date 08/12/2019 10:10AM .\par \par All medical record entries made by the Scribe were at my, Dr. Edu Boston DO direction and personally dictated by me on 08/12/2019 10:10AM. I have reviewed the chart and agree that the record accurately reflects my personal performance of the history, physical exam, assessment and plan. I have also personally directed, reviewed and agreed with the chart.\par

## 2019-08-12 NOTE — HISTORY OF PRESENT ILLNESS
[de-identified] : Patient is a 78 year old female with a past medical history as below who presents for fasting blood work, blood pressure check, and general follow-up. Patient states she is taking all medications as prescribed and denies any adverse reactions or side effects. She states GERD is mostly well-managed on Omeprazole, but still notes occasional breakthrough acid reflux. She notes right hip pain and inquires about a referral to an orthopedist. Patient notes having the flu after receiving the flu shot in the past. She also notes having pneumonia have receiving the pneumonia vaccine in the past with Dr. Weil.  [FreeTextEntry1] : fasting blood work, blood pressure check, and general follow-up

## 2019-08-12 NOTE — PLAN
[FreeTextEntry1] : Cardiology\par hypertension - continue Losartan Potassium 50mg BID p.o.q.d. and Amlodipine Besylate 10mg p.o.q.d. - continue low sodium diet\par continue Aspirin 325mg p.o.q.d. given history of ASCAD\par Endocrinology\par hyperlipidemia - continue Simvastatin 40mg p.o.q.h.s. - continue low cholesterol/low fat diet - check FLP and LFTs\par pre-diabetes (hyperglycemia) - continue low carbohydrate/low sugar diet and increased CV exercise - check hemoglobin A1C\par obesity - continue low carbohydrate diet and increased CV exercise\par vitamin D deficiency- continue vitamin D 1000 unit tablets p.o.q.d. - check vitamin D\par Gastroenterology\par GERD - continue Omeprazole 20mg p.o.q.d. - continue antireflux measures\par Musculoskeletal\par right hip pain - patient to follow up with [facility] \par \par check female panel

## 2019-08-12 NOTE — PHYSICAL EXAM
[No Acute Distress] : no acute distress [Well Nourished] : well nourished [Well Developed] : well developed [Well-Appearing] : well-appearing [Normal Sclera/Conjunctiva] : normal sclera/conjunctiva [PERRL] : pupils equal round and reactive to light [EOMI] : extraocular movements intact [Normal Outer Ear/Nose] : the outer ears and nose were normal in appearance [Normal Oropharynx] : the oropharynx was normal [No JVD] : no jugular venous distention [Supple] : supple [No Lymphadenopathy] : no lymphadenopathy [Thyroid Normal, No Nodules] : the thyroid was normal and there were no nodules present [No Accessory Muscle Use] : no accessory muscle use [No Respiratory Distress] : no respiratory distress  [Clear to Auscultation] : lungs were clear to auscultation bilaterally [Normal Rate] : normal rate  [Regular Rhythm] : with a regular rhythm [Normal S1, S2] : normal S1 and S2 [No Carotid Bruits] : no carotid bruits [No Murmur] : no murmur heard [No Abdominal Bruit] : a ~M bruit was not heard ~T in the abdomen [No Varicosities] : no varicosities [No Edema] : there was no peripheral edema [Pedal Pulses Present] : the pedal pulses are present [No Extremity Clubbing/Cyanosis] : no extremity clubbing/cyanosis [No Palpable Aorta] : no palpable aorta [Soft] : abdomen soft [Non Tender] : non-tender [Non-distended] : non-distended [No Masses] : no abdominal mass palpated [No HSM] : no HSM [Normal Bowel Sounds] : normal bowel sounds [Normal Posterior Cervical Nodes] : no posterior cervical lymphadenopathy [No CVA Tenderness] : no CVA  tenderness [Normal Anterior Cervical Nodes] : no anterior cervical lymphadenopathy [No Spinal Tenderness] : no spinal tenderness [No Joint Swelling] : no joint swelling [Grossly Normal Strength/Tone] : grossly normal strength/tone [No Rash] : no rash [Coordination Grossly Intact] : coordination grossly intact [No Focal Deficits] : no focal deficits [Normal Gait] : normal gait [Deep Tendon Reflexes (DTR)] : deep tendon reflexes were 2+ and symmetric [Normal Affect] : the affect was normal [Normal Insight/Judgement] : insight and judgment were intact [de-identified] : obese

## 2019-08-15 ENCOUNTER — OTHER (OUTPATIENT)
Age: 79
End: 2019-08-15

## 2019-08-15 ENCOUNTER — RX RENEWAL (OUTPATIENT)
Age: 79
End: 2019-08-15

## 2019-08-15 LAB
ALBUMIN SERPL ELPH-MCNC: 4 G/DL
ALP BLD-CCNC: 97 U/L
ALT SERPL-CCNC: 8 U/L
ANION GAP SERPL CALC-SCNC: 13 MMOL/L
AST SERPL-CCNC: 16 U/L
BASOPHILS # BLD AUTO: 0.1 K/UL
BASOPHILS NFR BLD AUTO: 1.1 %
BILIRUB SERPL-MCNC: 0.4 MG/DL
BUN SERPL-MCNC: 14 MG/DL
CALCIUM SERPL-MCNC: 9.7 MG/DL
CHLORIDE SERPL-SCNC: 103 MMOL/L
CHOLEST SERPL-MCNC: 162 MG/DL
CHOLEST/HDLC SERPL: 3.9 RATIO
CO2 SERPL-SCNC: 25 MMOL/L
CREAT SERPL-MCNC: 0.86 MG/DL
EOSINOPHIL # BLD AUTO: 0.29 K/UL
EOSINOPHIL NFR BLD AUTO: 3.1 %
ESTIMATED AVERAGE GLUCOSE: 134 MG/DL
GLUCOSE SERPL-MCNC: 128 MG/DL
HBA1C MFR BLD HPLC: 6.3 %
HCT VFR BLD CALC: 44.9 %
HDLC SERPL-MCNC: 42 MG/DL
HGB BLD-MCNC: 13.6 G/DL
IMM GRANULOCYTES NFR BLD AUTO: 0.3 %
LDLC SERPL CALC-MCNC: 99 MG/DL
LYMPHOCYTES # BLD AUTO: 1.89 K/UL
LYMPHOCYTES NFR BLD AUTO: 20.4 %
MAN DIFF?: NORMAL
MCHC RBC-ENTMCNC: 29.2 PG
MCHC RBC-ENTMCNC: 30.3 GM/DL
MCV RBC AUTO: 96.4 FL
MONOCYTES # BLD AUTO: 0.82 K/UL
MONOCYTES NFR BLD AUTO: 8.9 %
NEUTROPHILS # BLD AUTO: 6.13 K/UL
NEUTROPHILS NFR BLD AUTO: 66.2 %
PLATELET # BLD AUTO: 466 K/UL
POTASSIUM SERPL-SCNC: 4.4 MMOL/L
PROT SERPL-MCNC: 6.9 G/DL
RBC # BLD: 4.66 M/UL
RBC # FLD: 14.2 %
SODIUM SERPL-SCNC: 141 MMOL/L
TRIGL SERPL-MCNC: 104 MG/DL
TSH SERPL-ACNC: 1.54 UIU/ML
WBC # FLD AUTO: 9.26 K/UL

## 2019-08-16 LAB — 25(OH)D3 SERPL-MCNC: 32.3 NG/ML

## 2019-09-12 ENCOUNTER — APPOINTMENT (OUTPATIENT)
Dept: CARDIOLOGY | Facility: CLINIC | Age: 79
End: 2019-09-12
Payer: MEDICARE

## 2019-09-12 PROCEDURE — 93306 TTE W/DOPPLER COMPLETE: CPT

## 2019-09-16 ENCOUNTER — APPOINTMENT (OUTPATIENT)
Dept: CARDIOLOGY | Facility: CLINIC | Age: 79
End: 2019-09-16

## 2019-10-08 ENCOUNTER — APPOINTMENT (OUTPATIENT)
Dept: CARDIOLOGY | Facility: CLINIC | Age: 79
End: 2019-10-08
Payer: MEDICARE

## 2019-10-08 ENCOUNTER — NON-APPOINTMENT (OUTPATIENT)
Age: 79
End: 2019-10-08

## 2019-10-08 VITALS
SYSTOLIC BLOOD PRESSURE: 169 MMHG | HEIGHT: 61 IN | WEIGHT: 158 LBS | BODY MASS INDEX: 29.83 KG/M2 | HEART RATE: 58 BPM | OXYGEN SATURATION: 96 % | DIASTOLIC BLOOD PRESSURE: 74 MMHG

## 2019-10-08 PROCEDURE — 93000 ELECTROCARDIOGRAM COMPLETE: CPT

## 2019-10-08 PROCEDURE — 99214 OFFICE O/P EST MOD 30 MIN: CPT

## 2019-10-08 NOTE — PHYSICAL EXAM
[Well Groomed] : well groomed [General Appearance - In No Acute Distress] : no acute distress [Normal Conjunctiva] : the conjunctiva exhibited no abnormalities [Eyelids - No Xanthelasma] : the eyelids demonstrated no xanthelasmas [Normal Oral Mucosa] : normal oral mucosa [No Oral Cyanosis] : no oral cyanosis [No Oral Pallor] : no oral pallor [Normal Jugular Venous A Waves Present] : normal jugular venous A waves present [Normal Jugular Venous V Waves Present] : normal jugular venous V waves present [No Jugular Venous Costello A Waves] : no jugular venous costello A waves [Respiration, Rhythm And Depth] : normal respiratory rhythm and effort [Exaggerated Use Of Accessory Muscles For Inspiration] : no accessory muscle use [Auscultation Breath Sounds / Voice Sounds] : lungs were clear to auscultation bilaterally [Abdomen Soft] : soft [Abdomen Tenderness] : non-tender [Abdomen Mass (___ Cm)] : no abdominal mass palpated [Abnormal Walk] : normal gait [Gait - Sufficient For Exercise Testing] : the gait was sufficient for exercise testing [Nail Clubbing] : no clubbing of the fingernails [Petechial Hemorrhages (___cm)] : no petechial hemorrhages [Cyanosis, Localized] : no localized cyanosis [Skin Color & Pigmentation] : normal skin color and pigmentation [] : no rash [No Venous Stasis] : no venous stasis [Skin Lesions] : no skin lesions [No Skin Ulcers] : no skin ulcer [No Xanthoma] : no  xanthoma was observed [Affect] : the affect was normal [Oriented To Time, Place, And Person] : oriented to person, place, and time [Mood] : the mood was normal [No Anxiety] : not feeling anxious [Normal Rate] : normal [Rhythm Regular] : regular [Normal S1] : normal S1 [Normal S2] : normal S2 [No Gallop] : no gallop heard [II] : a grade 2 [I] : a grade 1 [1+] : left 1+ [Right Carotid Bruit] : no bruit heard over the right carotid [Left Carotid Bruit] : no bruit heard over the left carotid [2+] : left 2+ [Bruit] : no bruit heard [No Pitting Edema] : no pitting edema present

## 2019-10-08 NOTE — DISCUSSION/SUMMARY
[FreeTextEntry1] : 79-year-old woman with a history as listed above who presents today for a followup cardiac evaluation.\par \par Yara is doing well overall. She denies any anginal symptoms. Clinically she is euvolemic on exam. She had a nuclear stress test on 7/2016 that did not show any ischemia. I don’t think she requires another ischemic evaluation at this time. \par \par She is bradycardic on exam but her heart rate is much better off of the Coreg. \par her blood pressure is elevated still. She will continue Losartan 50mg q12. She will continue with her  Norvasc  10 mg daily. She does not want to try any type of diuretic.  I would like her to start on hydralazine 25mg q12. She is very reluctant to do so. Though she will try it. She understands and want to continue to try lifestyle modification.  \par \par She is coronary calcifications. Her goal LDL should be less than 100. She will continue her current dose of simvastatin 40mg HS.  Her last lipid profile shows that she is at goal. \par \par  She had an echo in 9/2019  that showed normal systolic LV function with mild AI. \par  Exercise and diet counseling was performed in order to reduce her future cardiovascular risk. \par \par She will followup with me in 3 months time or sooner if necessary.

## 2019-10-08 NOTE — HISTORY OF PRESENT ILLNESS
[FreeTextEntry1] : 78-year-old woman with a history of anxiety, hypertension, hyperlipidemia who had presented to [facility] with a pneumonia recently. During her hospitalization she was found to have lateral T wave inversions on EKG which were worse than previously seen. She had echocardiogram that showed normal left ventricular function. Her cardiac enzymes were negative. she had a CAT scan of the chest which was also revealing for coronary artery calcifications and calcifications to the aorta and its main branches. she had nuclear stress test which was unremarkable.\par She had a syncopal episode when she was on Chlorthalidone. She was taken to  in early 2019 and her diuretic was stopped.  \par \par She had an echo in 9/2019 that showed normal systolic LV function without any significant other findings, including no significant valvular disease. \par \par \par She now presents for a followup visit.\par Since her last visit, she has started on Metformin. She has been complaining of more back and hip pain. \par \par Her palpitations have resolved. She has not been exercising as much. SHe is still trying to help many elderly people with their doctors appointments.  She denies any anginal symptoms. She denies any dyspnea, chest pain, PND, orthopnea,  edema,  syncope, stroke like symptoms. She denies any blurry vision, headaches or recent stroke like symptoms. she's been compliant with her medications.  She has been trying to maintain a low salt diet.

## 2019-11-12 ENCOUNTER — APPOINTMENT (OUTPATIENT)
Dept: INTERNAL MEDICINE | Facility: CLINIC | Age: 79
End: 2019-11-12
Payer: MEDICARE

## 2019-11-12 VITALS
BODY MASS INDEX: 28.53 KG/M2 | SYSTOLIC BLOOD PRESSURE: 142 MMHG | HEIGHT: 61 IN | OXYGEN SATURATION: 98 % | RESPIRATION RATE: 16 BRPM | HEART RATE: 82 BPM | DIASTOLIC BLOOD PRESSURE: 70 MMHG | TEMPERATURE: 98 F | WEIGHT: 151.13 LBS

## 2019-11-12 PROCEDURE — G0444 DEPRESSION SCREEN ANNUAL: CPT | Mod: 59

## 2019-11-12 PROCEDURE — G0442 ANNUAL ALCOHOL SCREEN 15 MIN: CPT | Mod: 59

## 2019-11-12 PROCEDURE — 99214 OFFICE O/P EST MOD 30 MIN: CPT | Mod: 25

## 2019-11-12 PROCEDURE — 36415 COLL VENOUS BLD VENIPUNCTURE: CPT

## 2019-11-12 NOTE — ADDENDUM
[FreeTextEntry1] : I, Kevin Castaneda, acted solely as scribe for Dr. Edu Boston DO on this date 11/12/2019 10:10AM .\par \par All medical record entries made by the Scribe were at my, Dr. Edu Boston DO direction and personally dictated by me on 11/12/2019 10:10AM. I have reviewed the chart and agree that the record accurately reflects my personal performance of the history, physical exam, assessment and plan. I have also personally directed, reviewed and agreed with the chart.\par

## 2019-11-12 NOTE — PHYSICAL EXAM
[No Acute Distress] : no acute distress [Well Developed] : well developed [Well Nourished] : well nourished [Normal Sclera/Conjunctiva] : normal sclera/conjunctiva [Well-Appearing] : well-appearing [PERRL] : pupils equal round and reactive to light [EOMI] : extraocular movements intact [No JVD] : no jugular venous distention [Normal Oropharynx] : the oropharynx was normal [Normal Outer Ear/Nose] : the outer ears and nose were normal in appearance [No Lymphadenopathy] : no lymphadenopathy [Supple] : supple [Thyroid Normal, No Nodules] : the thyroid was normal and there were no nodules present [No Respiratory Distress] : no respiratory distress  [No Accessory Muscle Use] : no accessory muscle use [Clear to Auscultation] : lungs were clear to auscultation bilaterally [Normal Rate] : normal rate  [Normal S1, S2] : normal S1 and S2 [Regular Rhythm] : with a regular rhythm [No Carotid Bruits] : no carotid bruits [No Abdominal Bruit] : a ~M bruit was not heard ~T in the abdomen [No Varicosities] : no varicosities [Pedal Pulses Present] : the pedal pulses are present [No Edema] : there was no peripheral edema [No Palpable Aorta] : no palpable aorta [No Extremity Clubbing/Cyanosis] : no extremity clubbing/cyanosis [Soft] : abdomen soft [Non-distended] : non-distended [Non Tender] : non-tender [No Masses] : no abdominal mass palpated [No HSM] : no HSM [Normal Bowel Sounds] : normal bowel sounds [Normal Posterior Cervical Nodes] : no posterior cervical lymphadenopathy [No CVA Tenderness] : no CVA  tenderness [No Spinal Tenderness] : no spinal tenderness [Normal Anterior Cervical Nodes] : no anterior cervical lymphadenopathy [No Joint Swelling] : no joint swelling [No Rash] : no rash [Grossly Normal Strength/Tone] : grossly normal strength/tone [No Focal Deficits] : no focal deficits [Coordination Grossly Intact] : coordination grossly intact [Normal Gait] : normal gait [Deep Tendon Reflexes (DTR)] : deep tendon reflexes were 2+ and symmetric [Normal Affect] : the affect was normal [Normal Insight/Judgement] : insight and judgment were intact [de-identified] : upper airway rhonchi  [de-identified] : systolic ejection murmur at right and left sternal borders  [de-identified] : overweight

## 2019-11-12 NOTE — HEALTH RISK ASSESSMENT
[No] : In the past 12 months have you used drugs other than those required for medical reasons? No [1] : 2) Feeling down, depressed, or hopeless for several days (1) [] : No [[id number]] : 2

## 2019-11-12 NOTE — PLAN
[FreeTextEntry1] : ENT\par cough - secondary to bronchitis - start Amoxicillin-Pot Clavulanate 500-125mg BID p.o.q.d. as directed until finished, Rx filled - recommended staying well-hydrated and increased rest - patient to follow up if symptoms persist or worsen \par Cardiology\par hypertension - continue Losartan Potassium 50mg BID p.o.q.d., Hydralazine HCl 25mg BID p.o.q.d., Amlodipine Besylate 10mg p.o.q.d. - continue low sodium diet\par continue Aspirin 325mg p.o.q.d. given history of ASCAD - continue to follow up with cardiologist, Dr. Johnson\par Endocrinology\par hyperlipidemia - continue Simvastatin 40mg p.o.q.h.s. - continue low cholesterol/low fat diet - check FLP and LFTs\par pre-diabetes (hyperglycemia) - continue Metformin HCl ER 500mg BID p.o.q.d. - continue low carbohydrate/low sugar diet - check hemoglobin A1C\par vitamin D deficiency- continue vitamin D 1000 unit tablets p.o.q.d. - check vitamin D\par Psychiatry\par history of anxiety - continue Sertraline HCl 25mg p.o.q.d. - continue to follow up with psychiatrist, Dr. Thibodeaux\par Gastroenterology\par GERD - continue Omeprazole 20mg p.o.q.d. - continue antireflux measures\par Nephrology\par renal cyst - patient to gett results of recent MRI from orthopedist for our review\par Infectious Disease\par flu vaccine - discussed, refused\par \par check female panel\par Patient to follow up in 3 months for fasting blood work.

## 2019-11-12 NOTE — ASSESSMENT
[FreeTextEntry1] : Patient is a 79 year old female with a past medical history as above who presents for fasting blood work and general follow-up.\par

## 2019-11-12 NOTE — HISTORY OF PRESENT ILLNESS
[FreeTextEntry1] : fasting blood work and general follow-up\par  [de-identified] : Patient is a 79 year old female with a past medical history as below who presents for fasting blood work and general follow-up. Patient states she is taking all medications as prescribed and denies any adverse reactions or side effects. She denies lightheadedness or dizziness on blood pressure medications. She denies any new arthralgias or myalgias on Simvastatin. GERD is well-managed on Omeprazole. Patient notes being started on Hydralazine by cardiologist, Dr. Johnson. She states recent MRI per orthopedist, Dr. Hicks revealed a left renal cyst. Patient notes a cough that started 5 days ago and states Robitussin has been ineffective. She also notes expectorating white mucus, but denies fever, chills, or hemoptysis. Patient is not interested in receiving the flu vaccine as she notes becoming very ill after receiving the vaccine in the past.

## 2019-11-13 LAB
25(OH)D3 SERPL-MCNC: 46.2 NG/ML
ALBUMIN SERPL ELPH-MCNC: 4.2 G/DL
ALP BLD-CCNC: 99 U/L
ALT SERPL-CCNC: 12 U/L
ANION GAP SERPL CALC-SCNC: 16 MMOL/L
AST SERPL-CCNC: 14 U/L
BASOPHILS # BLD AUTO: 0.11 K/UL
BASOPHILS NFR BLD AUTO: 0.9 %
BILIRUB SERPL-MCNC: 0.5 MG/DL
BUN SERPL-MCNC: 13 MG/DL
CALCIUM SERPL-MCNC: 9.8 MG/DL
CHLORIDE SERPL-SCNC: 102 MMOL/L
CHOLEST SERPL-MCNC: 157 MG/DL
CHOLEST/HDLC SERPL: 3.7 RATIO
CO2 SERPL-SCNC: 22 MMOL/L
CREAT SERPL-MCNC: 0.87 MG/DL
EOSINOPHIL # BLD AUTO: 0.25 K/UL
EOSINOPHIL NFR BLD AUTO: 2.1 %
ESTIMATED AVERAGE GLUCOSE: 126 MG/DL
GLUCOSE SERPL-MCNC: 137 MG/DL
HBA1C MFR BLD HPLC: 6 %
HCT VFR BLD CALC: 45.1 %
HDLC SERPL-MCNC: 43 MG/DL
HGB BLD-MCNC: 14 G/DL
IMM GRANULOCYTES NFR BLD AUTO: 0.4 %
LDLC SERPL CALC-MCNC: 81 MG/DL
LYMPHOCYTES # BLD AUTO: 2.13 K/UL
LYMPHOCYTES NFR BLD AUTO: 17.7 %
MAN DIFF?: NORMAL
MCHC RBC-ENTMCNC: 29.5 PG
MCHC RBC-ENTMCNC: 31 GM/DL
MCV RBC AUTO: 95.1 FL
MONOCYTES # BLD AUTO: 1.01 K/UL
MONOCYTES NFR BLD AUTO: 8.4 %
NEUTROPHILS # BLD AUTO: 8.5 K/UL
NEUTROPHILS NFR BLD AUTO: 70.5 %
PLATELET # BLD AUTO: 479 K/UL
POTASSIUM SERPL-SCNC: 3.7 MMOL/L
PROT SERPL-MCNC: 7.1 G/DL
RBC # BLD: 4.74 M/UL
RBC # FLD: 13.8 %
SODIUM SERPL-SCNC: 140 MMOL/L
TRIGL SERPL-MCNC: 167 MG/DL
TSH SERPL-ACNC: 0.97 UIU/ML
WBC # FLD AUTO: 12.05 K/UL

## 2019-11-18 ENCOUNTER — INPATIENT (INPATIENT)
Facility: HOSPITAL | Age: 79
LOS: 0 days | Discharge: HOME | DRG: 203 | End: 2019-11-19
Attending: STUDENT IN AN ORGANIZED HEALTH CARE EDUCATION/TRAINING PROGRAM | Admitting: STUDENT IN AN ORGANIZED HEALTH CARE EDUCATION/TRAINING PROGRAM
Payer: COMMERCIAL

## 2019-11-18 VITALS
SYSTOLIC BLOOD PRESSURE: 106 MMHG | WEIGHT: 149.91 LBS | DIASTOLIC BLOOD PRESSURE: 67 MMHG | OXYGEN SATURATION: 96 % | RESPIRATION RATE: 30 BRPM | HEIGHT: 61 IN | HEART RATE: 84 BPM | TEMPERATURE: 98 F

## 2019-11-18 DIAGNOSIS — Z98.89 OTHER SPECIFIED POSTPROCEDURAL STATES: Chronic | ICD-10-CM

## 2019-11-18 DIAGNOSIS — Z98.890 OTHER SPECIFIED POSTPROCEDURAL STATES: Chronic | ICD-10-CM

## 2019-11-18 DIAGNOSIS — J18.9 PNEUMONIA, UNSPECIFIED ORGANISM: ICD-10-CM

## 2019-11-18 LAB
ALBUMIN SERPL ELPH-MCNC: 3.3 G/DL — SIGNIFICANT CHANGE UP (ref 3.3–5)
ALP SERPL-CCNC: 101 U/L — SIGNIFICANT CHANGE UP (ref 30–120)
ALT FLD-CCNC: 18 U/L DA — SIGNIFICANT CHANGE UP (ref 10–60)
ANION GAP SERPL CALC-SCNC: 11 MMOL/L — SIGNIFICANT CHANGE UP (ref 5–17)
APTT BLD: 31.1 SEC — SIGNIFICANT CHANGE UP (ref 28.5–37)
AST SERPL-CCNC: 25 U/L — SIGNIFICANT CHANGE UP (ref 10–40)
BASOPHILS # BLD AUTO: 0.1 K/UL — SIGNIFICANT CHANGE UP (ref 0–0.2)
BASOPHILS NFR BLD AUTO: 0.8 % — SIGNIFICANT CHANGE UP (ref 0–2)
BILIRUB SERPL-MCNC: 0.4 MG/DL — SIGNIFICANT CHANGE UP (ref 0.2–1.2)
BUN SERPL-MCNC: 15 MG/DL — SIGNIFICANT CHANGE UP (ref 7–23)
CALCIUM SERPL-MCNC: 9.1 MG/DL — SIGNIFICANT CHANGE UP (ref 8.4–10.5)
CHLORIDE SERPL-SCNC: 100 MMOL/L — SIGNIFICANT CHANGE UP (ref 96–108)
CO2 SERPL-SCNC: 27 MMOL/L — SIGNIFICANT CHANGE UP (ref 22–31)
CREAT SERPL-MCNC: 0.91 MG/DL — SIGNIFICANT CHANGE UP (ref 0.5–1.3)
EOSINOPHIL # BLD AUTO: 0.4 K/UL — SIGNIFICANT CHANGE UP (ref 0–0.5)
EOSINOPHIL NFR BLD AUTO: 3.3 % — SIGNIFICANT CHANGE UP (ref 0–6)
FLU A RESULT: SIGNIFICANT CHANGE UP
FLU A RESULT: SIGNIFICANT CHANGE UP
FLUAV AG NPH QL: SIGNIFICANT CHANGE UP
FLUBV AG NPH QL: SIGNIFICANT CHANGE UP
GLUCOSE BLDC GLUCOMTR-MCNC: 102 MG/DL — HIGH (ref 70–99)
GLUCOSE BLDC GLUCOMTR-MCNC: 108 MG/DL — HIGH (ref 70–99)
GLUCOSE BLDC GLUCOMTR-MCNC: 189 MG/DL — HIGH (ref 70–99)
GLUCOSE SERPL-MCNC: 147 MG/DL — HIGH (ref 70–99)
HCT VFR BLD CALC: 42.3 % — SIGNIFICANT CHANGE UP (ref 34.5–45)
HGB BLD-MCNC: 13.7 G/DL — SIGNIFICANT CHANGE UP (ref 11.5–15.5)
IMM GRANULOCYTES NFR BLD AUTO: 0.5 % — SIGNIFICANT CHANGE UP (ref 0–1.5)
INR BLD: 1.04 RATIO — SIGNIFICANT CHANGE UP (ref 0.88–1.16)
LACTATE SERPL-SCNC: 1.6 MMOL/L — SIGNIFICANT CHANGE UP (ref 0.7–2)
LACTATE SERPL-SCNC: 2.4 MMOL/L — HIGH (ref 0.7–2)
LACTATE SERPL-SCNC: 3 MMOL/L — HIGH (ref 0.7–2)
LYMPHOCYTES # BLD AUTO: 3.78 K/UL — HIGH (ref 1–3.3)
LYMPHOCYTES # BLD AUTO: 31 % — SIGNIFICANT CHANGE UP (ref 13–44)
MAGNESIUM SERPL-MCNC: 1.5 MG/DL — LOW (ref 1.6–2.6)
MCHC RBC-ENTMCNC: 29.4 PG — SIGNIFICANT CHANGE UP (ref 27–34)
MCHC RBC-ENTMCNC: 32.4 GM/DL — SIGNIFICANT CHANGE UP (ref 32–36)
MCV RBC AUTO: 90.8 FL — SIGNIFICANT CHANGE UP (ref 80–100)
MONOCYTES # BLD AUTO: 1.05 K/UL — HIGH (ref 0–0.9)
MONOCYTES NFR BLD AUTO: 8.6 % — SIGNIFICANT CHANGE UP (ref 2–14)
NEUTROPHILS # BLD AUTO: 6.8 K/UL — SIGNIFICANT CHANGE UP (ref 1.8–7.4)
NEUTROPHILS NFR BLD AUTO: 55.8 % — SIGNIFICANT CHANGE UP (ref 43–77)
NRBC # BLD: 0 /100 WBCS — SIGNIFICANT CHANGE UP (ref 0–0)
PLATELET # BLD AUTO: 479 K/UL — HIGH (ref 150–400)
POTASSIUM SERPL-MCNC: 3.2 MMOL/L — LOW (ref 3.5–5.3)
POTASSIUM SERPL-SCNC: 3.2 MMOL/L — LOW (ref 3.5–5.3)
PROT SERPL-MCNC: 7.6 G/DL — SIGNIFICANT CHANGE UP (ref 6–8.3)
PROTHROM AB SERPL-ACNC: 11.4 SEC — SIGNIFICANT CHANGE UP (ref 10–12.9)
RAPID RVP RESULT: SIGNIFICANT CHANGE UP
RBC # BLD: 4.66 M/UL — SIGNIFICANT CHANGE UP (ref 3.8–5.2)
RBC # FLD: 13.2 % — SIGNIFICANT CHANGE UP (ref 10.3–14.5)
RSV RESULT: SIGNIFICANT CHANGE UP
RSV RNA RESP QL NAA+PROBE: SIGNIFICANT CHANGE UP
SODIUM SERPL-SCNC: 138 MMOL/L — SIGNIFICANT CHANGE UP (ref 135–145)
WBC # BLD: 12.19 K/UL — HIGH (ref 3.8–10.5)
WBC # FLD AUTO: 12.19 K/UL — HIGH (ref 3.8–10.5)

## 2019-11-18 PROCEDURE — 99285 EMERGENCY DEPT VISIT HI MDM: CPT

## 2019-11-18 PROCEDURE — 71046 X-RAY EXAM CHEST 2 VIEWS: CPT | Mod: 26

## 2019-11-18 PROCEDURE — 71250 CT THORAX DX C-: CPT | Mod: 26

## 2019-11-18 PROCEDURE — 99223 1ST HOSP IP/OBS HIGH 75: CPT | Mod: AI

## 2019-11-18 PROCEDURE — 93010 ELECTROCARDIOGRAM REPORT: CPT

## 2019-11-18 RX ORDER — HYDRALAZINE HCL 50 MG
25 TABLET ORAL
Refills: 0 | Status: DISCONTINUED | OUTPATIENT
Start: 2019-11-18 | End: 2019-11-19

## 2019-11-18 RX ORDER — ASPIRIN/CALCIUM CARB/MAGNESIUM 324 MG
325 TABLET ORAL DAILY
Refills: 0 | Status: DISCONTINUED | OUTPATIENT
Start: 2019-11-18 | End: 2019-11-19

## 2019-11-18 RX ORDER — AZITHROMYCIN 500 MG/1
500 TABLET, FILM COATED ORAL ONCE
Refills: 0 | Status: COMPLETED | OUTPATIENT
Start: 2019-11-18 | End: 2019-11-18

## 2019-11-18 RX ORDER — POTASSIUM CHLORIDE 20 MEQ
40 PACKET (EA) ORAL ONCE
Refills: 0 | Status: COMPLETED | OUTPATIENT
Start: 2019-11-18 | End: 2019-11-18

## 2019-11-18 RX ORDER — SIMVASTATIN 20 MG/1
40 TABLET, FILM COATED ORAL AT BEDTIME
Refills: 0 | Status: DISCONTINUED | OUTPATIENT
Start: 2019-11-18 | End: 2019-11-19

## 2019-11-18 RX ORDER — CHOLECALCIFEROL (VITAMIN D3) 125 MCG
1000 CAPSULE ORAL DAILY
Refills: 0 | Status: DISCONTINUED | OUTPATIENT
Start: 2019-11-18 | End: 2019-11-19

## 2019-11-18 RX ORDER — SODIUM CHLORIDE 9 MG/ML
1000 INJECTION INTRAMUSCULAR; INTRAVENOUS; SUBCUTANEOUS ONCE
Refills: 0 | Status: COMPLETED | OUTPATIENT
Start: 2019-11-18 | End: 2019-11-18

## 2019-11-18 RX ORDER — LOSARTAN POTASSIUM 100 MG/1
50 TABLET, FILM COATED ORAL EVERY 12 HOURS
Refills: 0 | Status: DISCONTINUED | OUTPATIENT
Start: 2019-11-18 | End: 2019-11-19

## 2019-11-18 RX ORDER — IPRATROPIUM/ALBUTEROL SULFATE 18-103MCG
3 AEROSOL WITH ADAPTER (GRAM) INHALATION ONCE
Refills: 0 | Status: COMPLETED | OUTPATIENT
Start: 2019-11-18 | End: 2019-11-18

## 2019-11-18 RX ORDER — DEXTROSE 50 % IN WATER 50 %
25 SYRINGE (ML) INTRAVENOUS ONCE
Refills: 0 | Status: DISCONTINUED | OUTPATIENT
Start: 2019-11-18 | End: 2019-11-19

## 2019-11-18 RX ORDER — INSULIN LISPRO 100/ML
VIAL (ML) SUBCUTANEOUS
Refills: 0 | Status: DISCONTINUED | OUTPATIENT
Start: 2019-11-18 | End: 2019-11-19

## 2019-11-18 RX ORDER — PANTOPRAZOLE SODIUM 20 MG/1
40 TABLET, DELAYED RELEASE ORAL
Refills: 0 | Status: DISCONTINUED | OUTPATIENT
Start: 2019-11-18 | End: 2019-11-19

## 2019-11-18 RX ORDER — SODIUM CHLORIDE 9 MG/ML
1000 INJECTION, SOLUTION INTRAVENOUS
Refills: 0 | Status: DISCONTINUED | OUTPATIENT
Start: 2019-11-18 | End: 2019-11-19

## 2019-11-18 RX ORDER — LACTOBACILLUS ACIDOPHILUS 100MM CELL
1 CAPSULE ORAL ONCE
Refills: 0 | Status: COMPLETED | OUTPATIENT
Start: 2019-11-18 | End: 2019-11-18

## 2019-11-18 RX ORDER — AMLODIPINE BESYLATE 2.5 MG/1
10 TABLET ORAL DAILY
Refills: 0 | Status: DISCONTINUED | OUTPATIENT
Start: 2019-11-18 | End: 2019-11-19

## 2019-11-18 RX ORDER — AMLODIPINE BESYLATE 2.5 MG/1
1 TABLET ORAL
Qty: 0 | Refills: 0 | DISCHARGE

## 2019-11-18 RX ORDER — SERTRALINE 25 MG/1
1 TABLET, FILM COATED ORAL
Qty: 0 | Refills: 0 | DISCHARGE

## 2019-11-18 RX ORDER — DEXTROSE 50 % IN WATER 50 %
12.5 SYRINGE (ML) INTRAVENOUS ONCE
Refills: 0 | Status: DISCONTINUED | OUTPATIENT
Start: 2019-11-18 | End: 2019-11-19

## 2019-11-18 RX ORDER — INSULIN LISPRO 100/ML
VIAL (ML) SUBCUTANEOUS AT BEDTIME
Refills: 0 | Status: DISCONTINUED | OUTPATIENT
Start: 2019-11-18 | End: 2019-11-19

## 2019-11-18 RX ORDER — CEFTRIAXONE 500 MG/1
1000 INJECTION, POWDER, FOR SOLUTION INTRAMUSCULAR; INTRAVENOUS ONCE
Refills: 0 | Status: COMPLETED | OUTPATIENT
Start: 2019-11-18 | End: 2019-11-18

## 2019-11-18 RX ORDER — ACETAMINOPHEN 500 MG
650 TABLET ORAL ONCE
Refills: 0 | Status: COMPLETED | OUTPATIENT
Start: 2019-11-18 | End: 2019-11-18

## 2019-11-18 RX ORDER — BUDESONIDE, MICRONIZED 100 %
0.5 POWDER (GRAM) MISCELLANEOUS ONCE
Refills: 0 | Status: COMPLETED | OUTPATIENT
Start: 2019-11-18 | End: 2019-11-18

## 2019-11-18 RX ORDER — SODIUM CHLORIDE 9 MG/ML
500 INJECTION INTRAMUSCULAR; INTRAVENOUS; SUBCUTANEOUS ONCE
Refills: 0 | Status: COMPLETED | OUTPATIENT
Start: 2019-11-18 | End: 2019-11-18

## 2019-11-18 RX ORDER — GLUCAGON INJECTION, SOLUTION 0.5 MG/.1ML
1 INJECTION, SOLUTION SUBCUTANEOUS ONCE
Refills: 0 | Status: DISCONTINUED | OUTPATIENT
Start: 2019-11-18 | End: 2019-11-19

## 2019-11-18 RX ORDER — ALBUTEROL 90 UG/1
2.5 AEROSOL, METERED ORAL ONCE
Refills: 0 | Status: COMPLETED | OUTPATIENT
Start: 2019-11-18 | End: 2019-11-18

## 2019-11-18 RX ORDER — MAGNESIUM OXIDE 400 MG ORAL TABLET 241.3 MG
400 TABLET ORAL ONCE
Refills: 0 | Status: COMPLETED | OUTPATIENT
Start: 2019-11-18 | End: 2019-11-18

## 2019-11-18 RX ORDER — DEXTROSE 50 % IN WATER 50 %
15 SYRINGE (ML) INTRAVENOUS ONCE
Refills: 0 | Status: DISCONTINUED | OUTPATIENT
Start: 2019-11-18 | End: 2019-11-19

## 2019-11-18 RX ORDER — ENOXAPARIN SODIUM 100 MG/ML
40 INJECTION SUBCUTANEOUS AT BEDTIME
Refills: 0 | Status: DISCONTINUED | OUTPATIENT
Start: 2019-11-18 | End: 2019-11-19

## 2019-11-18 RX ORDER — ALBUTEROL 90 UG/1
2.5 AEROSOL, METERED ORAL EVERY 6 HOURS
Refills: 0 | Status: DISCONTINUED | OUTPATIENT
Start: 2019-11-18 | End: 2019-11-19

## 2019-11-18 RX ADMIN — ALBUTEROL 2.5 MILLIGRAM(S): 90 AEROSOL, METERED ORAL at 19:30

## 2019-11-18 RX ADMIN — AZITHROMYCIN 500 MILLIGRAM(S): 500 TABLET, FILM COATED ORAL at 08:11

## 2019-11-18 RX ADMIN — Medication 0.5 MILLIGRAM(S): at 10:04

## 2019-11-18 RX ADMIN — Medication 3 MILLILITER(S): at 06:51

## 2019-11-18 RX ADMIN — Medication 650 MILLIGRAM(S): at 07:44

## 2019-11-18 RX ADMIN — SODIUM CHLORIDE 1000 MILLILITER(S): 9 INJECTION INTRAMUSCULAR; INTRAVENOUS; SUBCUTANEOUS at 09:00

## 2019-11-18 RX ADMIN — Medication 1000 UNIT(S): at 13:58

## 2019-11-18 RX ADMIN — Medication 1 TABLET(S): at 07:34

## 2019-11-18 RX ADMIN — AMLODIPINE BESYLATE 10 MILLIGRAM(S): 2.5 TABLET ORAL at 14:08

## 2019-11-18 RX ADMIN — MAGNESIUM OXIDE 400 MG ORAL TABLET 400 MILLIGRAM(S): 241.3 TABLET ORAL at 09:56

## 2019-11-18 RX ADMIN — PANTOPRAZOLE SODIUM 40 MILLIGRAM(S): 20 TABLET, DELAYED RELEASE ORAL at 13:58

## 2019-11-18 RX ADMIN — Medication 200 MILLIGRAM(S): at 17:48

## 2019-11-18 RX ADMIN — CEFTRIAXONE 100 MILLIGRAM(S): 500 INJECTION, POWDER, FOR SOLUTION INTRAMUSCULAR; INTRAVENOUS at 09:37

## 2019-11-18 RX ADMIN — Medication 25 MILLIGRAM(S): at 17:37

## 2019-11-18 RX ADMIN — ENOXAPARIN SODIUM 40 MILLIGRAM(S): 100 INJECTION SUBCUTANEOUS at 21:25

## 2019-11-18 RX ADMIN — ALBUTEROL 2.5 MILLIGRAM(S): 90 AEROSOL, METERED ORAL at 10:04

## 2019-11-18 RX ADMIN — ALBUTEROL 2.5 MILLIGRAM(S): 90 AEROSOL, METERED ORAL at 14:02

## 2019-11-18 RX ADMIN — AZITHROMYCIN 255 MILLIGRAM(S): 500 TABLET, FILM COATED ORAL at 07:11

## 2019-11-18 RX ADMIN — Medication 325 MILLIGRAM(S): at 13:58

## 2019-11-18 RX ADMIN — SODIUM CHLORIDE 1000 MILLILITER(S): 9 INJECTION INTRAMUSCULAR; INTRAVENOUS; SUBCUTANEOUS at 10:00

## 2019-11-18 RX ADMIN — SODIUM CHLORIDE 1000 MILLILITER(S): 9 INJECTION INTRAMUSCULAR; INTRAVENOUS; SUBCUTANEOUS at 07:34

## 2019-11-18 RX ADMIN — LOSARTAN POTASSIUM 50 MILLIGRAM(S): 100 TABLET, FILM COATED ORAL at 19:20

## 2019-11-18 RX ADMIN — SODIUM CHLORIDE 1000 MILLILITER(S): 9 INJECTION INTRAMUSCULAR; INTRAVENOUS; SUBCUTANEOUS at 07:03

## 2019-11-18 RX ADMIN — Medication 200 MILLIGRAM(S): at 23:30

## 2019-11-18 RX ADMIN — SIMVASTATIN 40 MILLIGRAM(S): 20 TABLET, FILM COATED ORAL at 21:25

## 2019-11-18 RX ADMIN — Medication 200 MILLIGRAM(S): at 08:40

## 2019-11-18 RX ADMIN — Medication 40 MILLIEQUIVALENT(S): at 07:40

## 2019-11-18 RX ADMIN — Medication 650 MILLIGRAM(S): at 08:14

## 2019-11-18 RX ADMIN — SODIUM CHLORIDE 1000 MILLILITER(S): 9 INJECTION INTRAMUSCULAR; INTRAVENOUS; SUBCUTANEOUS at 08:04

## 2019-11-18 NOTE — H&P ADULT - NSICDXPASTMEDICALHX_GEN_ALL_CORE_FT
PAST MEDICAL HISTORY:  CAD (coronary atherosclerotic disease)     Depression     GERD (gastroesophageal reflux disease)     High cholesterol     Hypertension     Prediabetes     Thyroid cancer

## 2019-11-18 NOTE — ED PROVIDER NOTE - OBJECTIVE STATEMENT
productive cough > 10 days, generalized weakness, and decreased po intake.  pt was dx bronchitis by MARICRUZ Boston, started on Augmentin 6 days ago c no improvement.  SOB today intermittently. Left sided rib pain during coughing.  no prior episode.  no other complaints.

## 2019-11-18 NOTE — H&P ADULT - NSHPPHYSICALEXAM_GEN_ALL_CORE
T(C): 36.7 (11-18-19 @ 06:23), Max: 36.7 (11-18-19 @ 06:23)  HR: 60 (11-18-19 @ 10:04) (60 - 84)  BP: 125/70 (11-18-19 @ 10:00) (106/67 - 142/66)  RR: 15 (11-18-19 @ 10:00) (15 - 30)  SpO2: 97% (11-18-19 @ 10:04) (95% - 98%)    GENERAL: patient appears well, no acute distress, appropriate, pleasant  EYES: sclera clear, no exudates  ENMT: oropharynx clear without erythema, no exudates, moist mucous membranes  NECK: supple, soft, no thyromegaly noted  LUNGS: good air entry bilaterally, clear to auscultation, symmetric breath sounds, no wheezing or rhonchi appreciated  HEART: soft S1/S2, regular rate and rhythm, systolic murmur present, no lower extremity edema  GASTROINTESTINAL: abdomen is soft, nontender, nondistended, normoactive bowel sounds, no palpable masses  INTEGUMENT: good skin turgor, warm skin, appears well perfused  MUSCULOSKELETAL: no clubbing or cyanosis, no obvious deformity  NEUROLOGIC: awake, alert, oriented x3, good muscle tone in 4 extremities, no obvious sensory deficits  PSYCHIATRIC: mood is good, affect is congruent, linear and logical thought process  HEME/LYMPH: no palpable supraclavicular nodules, no obvious ecchymosis or petechiae

## 2019-11-18 NOTE — ED PROVIDER NOTE - CARE PLAN
Principal Discharge DX:	Pneumonia  Secondary Diagnosis:	Dehydration  Secondary Diagnosis:	Hypomagnesemia  Secondary Diagnosis:	Hypokalemia

## 2019-11-18 NOTE — H&P ADULT - ASSESSMENT
78 y/o F with PMHx of CAD, depression, anxiety, GERD, PUD, HLD, Prediabetes (on metformin), thyroid CA s/p subtotal thyroidectomy admitted with bronchitis refractory to outpatient management and electrolyte abnormalities.    Bronchitis  - admit to medicine  - bronchodilators q6 for now, antitussives PRN  - s/p outpatient course of augmentin for 6 days  - s/p rocephin/azithro and 2.5L NS bolus in ER  - lactate trending down, recheck after fluids  - CXR and CT without evidence of pneumonia  - monitor off abx, supportive treatment for now  - monitor for fevers, tylenol PRN  - flu/RSV neg, f/u blood/urine cx, RVP, procalcitonin  - saturating well on room air, supplemental O2 as needed    Weakness  - suspect from prolonged bronchitis and decreased PO intake  - supportive therapy, IV fluids  - ambulate with assistance    Electrolyte abnormalities  - hypokalemia and hypomagnesemia on admission  - repleted with PO potassium and magnesium in ER  - monitor BMP, Mg and Phos; replete electrolytes as needed    GERD/PUD  - continue PPI    CAD  - continue ASA 325mg and statin    HTN  - continue hydralazine 25mg twice a day  - continue amlodipine 10mg daily  - continue losartan 50mg twice a day  - monitor hemodynamics    Prediabetes/DM2  - on metformin ER 500mg twice a day at home, hold here  - start on low dose insulin corrective scale  - monitor blood glucose, hypoglycemia protocol  - check hgb A1C in AM    Need for prophylactic measure  - DVT ppx with lovenox    IMPROVE VTE Individual Risk Assessment          RISK                                                          Points  [  ] Previous VTE                                                3  [  ] Thrombophilia                                             2  [  ] Lower limb paralysis                                   2        (unable to hold up >15 seconds)    [  ] Current Cancer                                             2         (within 6 months)  [ x ] Immobilization > 24 hrs    (anticipated)                 1  [  ] ICU/CCU stay > 24 hours                             1  [ x ] Age > 60                                                         1    IMPROVE VTE Score: 2

## 2019-11-18 NOTE — ED ADULT TRIAGE NOTE - CHIEF COMPLAINT QUOTE
'on amox for bronchitis since tuesday not feeling better, worse today' 'on augmentin for bronchitis since tuesday not feeling better, worse today'

## 2019-11-18 NOTE — ED ADULT NURSE NOTE - NSIMPLEMENTINTERV_GEN_ALL_ED
Implemented All Universal Safety Interventions:  [place] to call system. Call bell, personal items and telephone within reach. Instruct patient to call for assistance. Room bathroom lighting operational. Non-slip footwear when patient is off stretcher. Physically safe environment: no spills, clutter or unnecessary equipment. Stretcher in lowest position, wheels locked, appropriate side rails in place.

## 2019-11-18 NOTE — H&P ADULT - HISTORY OF PRESENT ILLNESS
78 y/o F with PMHx of CAD, depression, GERD, PUD, HLD, Prediabetes (on metformin), thyroid CA s/p subtotal thyroidectomy presents with a 2 week history of cough. She states that the cough leads to a sensation of shortness of breath but no shortness of breath at rest. Nebulizer treatments have improved her symptoms here in the ER. She states that she was diagnosed with bronchitis on 11/12 and placed on augmentin, but had symptoms 5 days prior to this. She reports no difficulty swallowing but after eating or drinking does have fear of triggering cough. No choking on food or water. She reports no fevers or myalgias. No sick contacts. Admits to generalized weakness. She is concerned for dehydration, as she has been hospitalized a few times for this. Denies chest pain, palpitations, leg swelling, headache, dizziness, abdominal pain, dysuria, hematuria, n/v/d. Admits to chronic stress incontinence. She states that she took OTC robitussin for her cough, which usually helps but this time did not. Cough is productive of phlegm. Also causes left sided rib pain with forceful coughing.  In the ED, VS stable normal oxygenation on room air. Labs significant for WBC 12.19, Lactate 3.0 to 2.4, Mg 1.5, K 3.2. CXR was clear no infiltrates. CT chest without evidence of pneumonia or acute process. EKG NSR at 64bpm. Labs, imaging, EKG all personally reviewed.

## 2019-11-19 ENCOUNTER — TRANSCRIPTION ENCOUNTER (OUTPATIENT)
Age: 79
End: 2019-11-19

## 2019-11-19 VITALS
SYSTOLIC BLOOD PRESSURE: 140 MMHG | HEART RATE: 68 BPM | TEMPERATURE: 98 F | RESPIRATION RATE: 20 BRPM | DIASTOLIC BLOOD PRESSURE: 69 MMHG | OXYGEN SATURATION: 94 %

## 2019-11-19 DIAGNOSIS — F41.9 ANXIETY DISORDER, UNSPECIFIED: ICD-10-CM

## 2019-11-19 LAB
ANION GAP SERPL CALC-SCNC: 12 MMOL/L — SIGNIFICANT CHANGE UP (ref 5–17)
BASE EXCESS BLDV CALC-SCNC: 3 MMOL/L — HIGH (ref -2–2)
BASOPHILS # BLD AUTO: 0.08 K/UL — SIGNIFICANT CHANGE UP (ref 0–0.2)
BASOPHILS NFR BLD AUTO: 0.8 % — SIGNIFICANT CHANGE UP (ref 0–2)
BUN SERPL-MCNC: 10 MG/DL — SIGNIFICANT CHANGE UP (ref 7–23)
CALCIUM SERPL-MCNC: 8.8 MG/DL — SIGNIFICANT CHANGE UP (ref 8.4–10.5)
CHLORIDE SERPL-SCNC: 106 MMOL/L — SIGNIFICANT CHANGE UP (ref 96–108)
CO2 SERPL-SCNC: 26 MMOL/L — SIGNIFICANT CHANGE UP (ref 22–31)
CREAT SERPL-MCNC: 0.7 MG/DL — SIGNIFICANT CHANGE UP (ref 0.5–1.3)
D DIMER BLD IA.RAPID-MCNC: 393 NG/ML DDU — HIGH
EOSINOPHIL # BLD AUTO: 0.34 K/UL — SIGNIFICANT CHANGE UP (ref 0–0.5)
EOSINOPHIL NFR BLD AUTO: 3.2 % — SIGNIFICANT CHANGE UP (ref 0–6)
GAS PNL BLDV: SIGNIFICANT CHANGE UP
GLUCOSE BLDC GLUCOMTR-MCNC: 121 MG/DL — HIGH (ref 70–99)
GLUCOSE BLDC GLUCOMTR-MCNC: 91 MG/DL — SIGNIFICANT CHANGE UP (ref 70–99)
GLUCOSE SERPL-MCNC: 123 MG/DL — HIGH (ref 70–99)
HBA1C BLD-MCNC: 6 % — HIGH (ref 4–5.6)
HCO3 BLDV-SCNC: 27 MMOL/L — SIGNIFICANT CHANGE UP (ref 21–29)
HCT VFR BLD CALC: 37.7 % — SIGNIFICANT CHANGE UP (ref 34.5–45)
HGB BLD-MCNC: 12.2 G/DL — SIGNIFICANT CHANGE UP (ref 11.5–15.5)
HOROWITZ INDEX BLDV+IHG-RTO: 21 — SIGNIFICANT CHANGE UP
IMM GRANULOCYTES NFR BLD AUTO: 0.6 % — SIGNIFICANT CHANGE UP (ref 0–1.5)
LYMPHOCYTES # BLD AUTO: 2.87 K/UL — SIGNIFICANT CHANGE UP (ref 1–3.3)
LYMPHOCYTES # BLD AUTO: 27.2 % — SIGNIFICANT CHANGE UP (ref 13–44)
MAGNESIUM SERPL-MCNC: 1.5 MG/DL — LOW (ref 1.6–2.6)
MCHC RBC-ENTMCNC: 29.7 PG — SIGNIFICANT CHANGE UP (ref 27–34)
MCHC RBC-ENTMCNC: 32.4 GM/DL — SIGNIFICANT CHANGE UP (ref 32–36)
MCV RBC AUTO: 91.7 FL — SIGNIFICANT CHANGE UP (ref 80–100)
MONOCYTES # BLD AUTO: 1.09 K/UL — HIGH (ref 0–0.9)
MONOCYTES NFR BLD AUTO: 10.3 % — SIGNIFICANT CHANGE UP (ref 2–14)
NEUTROPHILS # BLD AUTO: 6.13 K/UL — SIGNIFICANT CHANGE UP (ref 1.8–7.4)
NEUTROPHILS NFR BLD AUTO: 57.9 % — SIGNIFICANT CHANGE UP (ref 43–77)
NRBC # BLD: 0 /100 WBCS — SIGNIFICANT CHANGE UP (ref 0–0)
PCO2 BLDV: 36 MMHG — SIGNIFICANT CHANGE UP (ref 35–50)
PH BLDV: 7.48 — HIGH (ref 7.35–7.45)
PHOSPHATE SERPL-MCNC: 2.8 MG/DL — SIGNIFICANT CHANGE UP (ref 2.5–4.5)
PLATELET # BLD AUTO: 408 K/UL — HIGH (ref 150–400)
PO2 BLDV: 48 MMHG — HIGH (ref 25–45)
POTASSIUM SERPL-MCNC: 3.5 MMOL/L — SIGNIFICANT CHANGE UP (ref 3.5–5.3)
POTASSIUM SERPL-SCNC: 3.5 MMOL/L — SIGNIFICANT CHANGE UP (ref 3.5–5.3)
PROCALCITONIN SERPL-MCNC: 0.05 NG/ML — SIGNIFICANT CHANGE UP (ref 0.02–0.1)
RBC # BLD: 4.11 M/UL — SIGNIFICANT CHANGE UP (ref 3.8–5.2)
RBC # FLD: 13.5 % — SIGNIFICANT CHANGE UP (ref 10.3–14.5)
SAO2 % BLDV: 84 % — SIGNIFICANT CHANGE UP (ref 67–88)
SODIUM SERPL-SCNC: 144 MMOL/L — SIGNIFICANT CHANGE UP (ref 135–145)
WBC # BLD: 10.57 K/UL — HIGH (ref 3.8–10.5)
WBC # FLD AUTO: 10.57 K/UL — HIGH (ref 3.8–10.5)

## 2019-11-19 PROCEDURE — 87633 RESP VIRUS 12-25 TARGETS: CPT

## 2019-11-19 PROCEDURE — 82803 BLOOD GASES ANY COMBINATION: CPT

## 2019-11-19 PROCEDURE — 99239 HOSP IP/OBS DSCHRG MGMT >30: CPT

## 2019-11-19 PROCEDURE — 85730 THROMBOPLASTIN TIME PARTIAL: CPT

## 2019-11-19 PROCEDURE — 84145 PROCALCITONIN (PCT): CPT

## 2019-11-19 PROCEDURE — 36415 COLL VENOUS BLD VENIPUNCTURE: CPT

## 2019-11-19 PROCEDURE — 94640 AIRWAY INHALATION TREATMENT: CPT

## 2019-11-19 PROCEDURE — 93970 EXTREMITY STUDY: CPT | Mod: 26

## 2019-11-19 PROCEDURE — 97161 PT EVAL LOW COMPLEX 20 MIN: CPT

## 2019-11-19 PROCEDURE — 93005 ELECTROCARDIOGRAM TRACING: CPT

## 2019-11-19 PROCEDURE — 71046 X-RAY EXAM CHEST 2 VIEWS: CPT

## 2019-11-19 PROCEDURE — 87581 M.PNEUMON DNA AMP PROBE: CPT

## 2019-11-19 PROCEDURE — 87040 BLOOD CULTURE FOR BACTERIA: CPT

## 2019-11-19 PROCEDURE — 96361 HYDRATE IV INFUSION ADD-ON: CPT

## 2019-11-19 PROCEDURE — 96365 THER/PROPH/DIAG IV INF INIT: CPT

## 2019-11-19 PROCEDURE — 83605 ASSAY OF LACTIC ACID: CPT

## 2019-11-19 PROCEDURE — 99285 EMERGENCY DEPT VISIT HI MDM: CPT | Mod: 25

## 2019-11-19 PROCEDURE — 71250 CT THORAX DX C-: CPT

## 2019-11-19 PROCEDURE — 85027 COMPLETE CBC AUTOMATED: CPT

## 2019-11-19 PROCEDURE — 87486 CHLMYD PNEUM DNA AMP PROBE: CPT

## 2019-11-19 PROCEDURE — 83036 HEMOGLOBIN GLYCOSYLATED A1C: CPT

## 2019-11-19 PROCEDURE — 80048 BASIC METABOLIC PNL TOTAL CA: CPT

## 2019-11-19 PROCEDURE — 82962 GLUCOSE BLOOD TEST: CPT

## 2019-11-19 PROCEDURE — 84100 ASSAY OF PHOSPHORUS: CPT

## 2019-11-19 PROCEDURE — 93970 EXTREMITY STUDY: CPT

## 2019-11-19 PROCEDURE — 85379 FIBRIN DEGRADATION QUANT: CPT

## 2019-11-19 PROCEDURE — 87631 RESP VIRUS 3-5 TARGETS: CPT

## 2019-11-19 PROCEDURE — 83735 ASSAY OF MAGNESIUM: CPT

## 2019-11-19 PROCEDURE — 87798 DETECT AGENT NOS DNA AMP: CPT

## 2019-11-19 PROCEDURE — 80053 COMPREHEN METABOLIC PANEL: CPT

## 2019-11-19 PROCEDURE — 85610 PROTHROMBIN TIME: CPT

## 2019-11-19 RX ORDER — MAGNESIUM SULFATE 500 MG/ML
2 VIAL (ML) INJECTION ONCE
Refills: 0 | Status: COMPLETED | OUTPATIENT
Start: 2019-11-19 | End: 2019-11-19

## 2019-11-19 RX ORDER — ALBUTEROL 90 UG/1
2 AEROSOL, METERED ORAL
Qty: 1 | Refills: 0
Start: 2019-11-19 | End: 2019-12-18

## 2019-11-19 RX ADMIN — ALBUTEROL 2.5 MILLIGRAM(S): 90 AEROSOL, METERED ORAL at 00:34

## 2019-11-19 RX ADMIN — LOSARTAN POTASSIUM 50 MILLIGRAM(S): 100 TABLET, FILM COATED ORAL at 06:09

## 2019-11-19 RX ADMIN — Medication 50 GRAM(S): at 11:00

## 2019-11-19 RX ADMIN — PANTOPRAZOLE SODIUM 40 MILLIGRAM(S): 20 TABLET, DELAYED RELEASE ORAL at 06:08

## 2019-11-19 RX ADMIN — Medication 1000 UNIT(S): at 11:00

## 2019-11-19 RX ADMIN — Medication 325 MILLIGRAM(S): at 11:00

## 2019-11-19 RX ADMIN — AMLODIPINE BESYLATE 10 MILLIGRAM(S): 2.5 TABLET ORAL at 06:09

## 2019-11-19 RX ADMIN — Medication 25 MILLIGRAM(S): at 06:08

## 2019-11-19 RX ADMIN — ALBUTEROL 2.5 MILLIGRAM(S): 90 AEROSOL, METERED ORAL at 07:41

## 2019-11-19 NOTE — DISCHARGE NOTE PROVIDER - CARE PROVIDER_API CALL
Edu Boston (DO)  Medicine  [facility]  [address] [place] [address]  Phone: [phone]  Fax: [phone]  Follow Up Time:

## 2019-11-19 NOTE — DISCHARGE NOTE PROVIDER - NSDCFUSCHEDAPPT_GEN_ALL_CORE_FT
ADELFO KERR ; 01/13/2020 ; [facility] Card[facility] [address]  ADELFO KERR ; 02/11/2020 ; [facility] Intmed [facility] ADELFO KERR ; 01/13/2020 ; NPP Cardio [address]  ADELFO KERR ; 02/11/2020 ; [facility] Intmed [facility] ADELFO KERR ; 01/13/2020 ; NPP Cardio [address]  ADELFO KERR ; 02/11/2020 ; NPP Intmed [facility]

## 2019-11-19 NOTE — DISCHARGE NOTE PROVIDER - NSDCCPCAREPLAN_GEN_ALL_CORE_FT
PRINCIPAL DISCHARGE DIAGNOSIS  Diagnosis: Bronchitis  Assessment and Plan of Treatment:       SECONDARY DISCHARGE DIAGNOSES  Diagnosis: Hypokalemia  Assessment and Plan of Treatment:     Diagnosis: Hypomagnesemia  Assessment and Plan of Treatment:     Diagnosis: Dehydration  Assessment and Plan of Treatment: PRINCIPAL DISCHARGE DIAGNOSIS  Diagnosis: Bronchitis  Assessment and Plan of Treatment: your work up was negative for pneumonia. there is no role for antibiotics. likely viral induced - cough may linger for weeks. Please follow up with Dr. Boston in 1 week. Proventil inhaler 2 puffs every 4 hours as needed. Drink warm tea with honey. Supportive therapy.      SECONDARY DISCHARGE DIAGNOSES  Diagnosis: Dehydration  Assessment and Plan of Treatment: treated with IV fluids - drink plenty of fluids.    Diagnosis: Hypokalemia  Assessment and Plan of Treatment: supplemented. increase oral intake.    Diagnosis: Hypomagnesemia  Assessment and Plan of Treatment: supplemented. increase oral intake.

## 2019-11-19 NOTE — DISCHARGE NOTE PROVIDER - HOSPITAL COURSE
FROM ADMISSION H+P:     HPI:    78 y/o F with PMHx of CAD, depression, GERD, PUD, HLD, Prediabetes (on metformin), thyroid CA s/p subtotal thyroidectomy presents with a 2 week history of cough. She states that the cough leads to a sensation of shortness of breath but no shortness of breath at rest. Nebulizer treatments have improved her symptoms here in the ER. She states that she was diagnosed with bronchitis on 11/12 and placed on augmentin, but had symptoms 5 days prior to this. She reports no difficulty swallowing but after eating or drinking does have fear of triggering cough. No choking on food or water. She reports no fevers or myalgias. No sick contacts. Admits to generalized weakness. She is concerned for dehydration, as she has been hospitalized a few times for this. Denies chest pain, palpitations, leg swelling, headache, dizziness, abdominal pain, dysuria, hematuria, n/v/d. Admits to chronic stress incontinence. She states that she took OTC robitussin for her cough, which usually helps but this time did not. Cough is productive of phlegm. Also causes left sided rib pain with forceful coughing.    In the ED, VS stable normal oxygenation on room air. Labs significant for WBC 12.19, Lactate 3.0 to 2.4, Mg 1.5, K 3.2. CXR was clear no infiltrates. CT chest without evidence of pneumonia or acute process. EKG NSR at 64bpm. Labs, imaging, EKG all personally reviewed. (18 Nov 2019 10:58)            ---    HOSPITAL COURSE: Patient admitted with bronchitis and electrolyte abnormalities. Hydrated, treated with supportive therapy. RVP negative. No signs of pneumonia. Obtained pulmonology consult as pt felt very anxious about her unimproved symptoms despite negative work up        ---    CONSULTANTS:     Pulanthony Coon    ---    TIME SPENT:    The total amount of time spent reviewing the hospital notes, laboratory values, imaging findings, assessing/counseling the patient, discussing with consultant physicians, social work, nursing staff took -- minutes        --- FROM ADMISSION H+P:     HPI:    78 y/o F with PMHx of CAD, depression, GERD, PUD, HLD, Prediabetes (on metformin), thyroid CA s/p subtotal thyroidectomy presents with a 2 week history of cough. She states that the cough leads to a sensation of shortness of breath but no shortness of breath at rest. Nebulizer treatments have improved her symptoms here in the ER. She states that she was diagnosed with bronchitis on 11/12 and placed on augmentin, but had symptoms 5 days prior to this. She reports no difficulty swallowing but after eating or drinking does have fear of triggering cough. No choking on food or water. She reports no fevers or myalgias. No sick contacts. Admits to generalized weakness. She is concerned for dehydration, as she has been hospitalized a few times for this. Denies chest pain, palpitations, leg swelling, headache, dizziness, abdominal pain, dysuria, hematuria, n/v/d. Admits to chronic stress incontinence. She states that she took OTC robitussin for her cough, which usually helps but this time did not. Cough is productive of phlegm. Also causes left sided rib pain with forceful coughing.    In the ED, VS stable normal oxygenation on room air. Labs significant for WBC 12.19, Lactate 3.0 to 2.4, Mg 1.5, K 3.2. CXR was clear no infiltrates. CT chest without evidence of pneumonia or acute process. EKG NSR at 64bpm. Labs, imaging, EKG all personally reviewed. (18 Nov 2019 10:58)            ---    HOSPITAL COURSE: Patient admitted with bronchitis and electrolyte abnormalities. Hydrated, treated with supportive therapy. RVP negative. No signs of pneumonia. Obtained pulmonology consult as pt felt very anxious about her unimproved symptoms despite negative work up. Pulm suggested US dopplers of lower ext and D-dimer. Spoke to pulm re: mild elevation in D-dimer - does not warrant CT angio, low suspicion for PE. Spoke with PMD Dr. Boston - to d/c home and follow up in 1 week. PT evaluated patient determining no physical therapy needs.        ---    CONSULTANTS:     Pulanthony Coon    ---    TIME SPENT:    The total amount of time spent reviewing the hospital notes, laboratory values, imaging findings, assessing/counseling the patient, discussing with consultant physicians, social work, nursing staff took 45 minutes        --- FROM ADMISSION H+P:     HPI:    78 y/o F with PMHx of CAD, depression, GERD, PUD, HLD, Prediabetes (on metformin), thyroid CA s/p subtotal thyroidectomy presents with a 2 week history of cough. She states that the cough leads to a sensation of shortness of breath but no shortness of breath at rest. Nebulizer treatments have improved her symptoms here in the ER. She states that she was diagnosed with bronchitis on 11/12 and placed on augmentin, but had symptoms 5 days prior to this. She reports no difficulty swallowing but after eating or drinking does have fear of triggering cough. No choking on food or water. She reports no fevers or myalgias. No sick contacts. Admits to generalized weakness. She is concerned for dehydration, as she has been hospitalized a few times for this. Denies chest pain, palpitations, leg swelling, headache, dizziness, abdominal pain, dysuria, hematuria, n/v/d. Admits to chronic stress incontinence. She states that she took OTC robitussin for her cough, which usually helps but this time did not. Cough is productive of phlegm. Also causes left sided rib pain with forceful coughing.    In the ED, VS stable normal oxygenation on room air. Labs significant for WBC 12.19, Lactate 3.0 to 2.4, Mg 1.5, K 3.2. CXR was clear no infiltrates. CT chest without evidence of pneumonia or acute process. EKG NSR at 64bpm. Labs, imaging, EKG all personally reviewed. (18 Nov 2019 10:58)            ---    HOSPITAL COURSE: Patient admitted with bronchitis and electrolyte abnormalities. Hydrated, treated with supportive therapy. RVP negative. No signs of pneumonia. Obtained pulmonology consult as pt felt very anxious about her unimproved symptoms despite negative work up. Pulm suggested US dopplers of lower ext and D-dimer (age adjusted negative). Spoke to pulm re: mild elevation in D-dimer - does not warrant CT angio, low suspicion for PE. Spoke with PMD Dr. Boston - to d/c home and follow up in 1 week. PT evaluated patient determining no physical therapy needs.        ---    CONSULTANTS:     Pulanthony Coon    ---    TIME SPENT:    The total amount of time spent reviewing the hospital notes, laboratory values, imaging findings, assessing/counseling the patient, discussing with consultant physicians, social work, nursing staff took 45 minutes        ---

## 2019-11-19 NOTE — DISCHARGE NOTE PROVIDER - NSDCMRMEDTOKEN_GEN_ALL_CORE_FT
amLODIPine 10 mg oral tablet: 1 tab(s) orally once a day  Ecotrin 325 mg oral delayed release tablet: 1 tab(s) orally once a day  hydrALAZINE 25 mg oral tablet: 1 tab(s) orally 2 times a day  losartan 50 mg oral tablet: 1 tab(s) orally 2 times a day  metFORMIN 500 mg oral tablet, extended release: 1 tab(s) orally 2 times a day  omeprazole 20 mg oral delayed release capsule: 1 cap(s) orally once a day  simvastatin 40 mg oral tablet: 1 tab(s) orally once a day (at bedtime)  Vitamin D3 1000 intl units oral tablet: 1 tab(s) orally once a day albuterol 90 mcg/inh inhalation aerosol: 2 puff(s) inhaled every 4 hours, As Needed -for shortness of breath and/or wheezing   amLODIPine 10 mg oral tablet: 1 tab(s) orally once a day  Ecotrin 325 mg oral delayed release tablet: 1 tab(s) orally once a day  guaiFENesin 100 mg/5 mL oral liquid: 10 milliliter(s) orally every 6 hours, As needed, Cough  hydrALAZINE 25 mg oral tablet: 1 tab(s) orally 2 times a day  losartan 50 mg oral tablet: 1 tab(s) orally 2 times a day  metFORMIN 500 mg oral tablet, extended release: 1 tab(s) orally 2 times a day  omeprazole 20 mg oral delayed release capsule: 1 cap(s) orally once a day  simvastatin 40 mg oral tablet: 1 tab(s) orally once a day (at bedtime)  Vitamin D3 1000 intl units oral tablet: 1 tab(s) orally once a day

## 2019-11-19 NOTE — PHYSICAL THERAPY INITIAL EVALUATION ADULT - PERTINENT HX OF CURRENT PROBLEM, REHAB EVAL
Pt. admitted with a 2 week history of cough. She states that the cough leads to a sensation of shortness of breath but no shortness of breath at rest. Nebulizer treatments have improved her symptoms here in the ER.

## 2019-11-19 NOTE — DISCHARGE NOTE NURSING/CASE MANAGEMENT/SOCIAL WORK - PATIENT PORTAL LINK FT
You can access the FollowMyHealth Patient Portal offered by [facility] by registering at the following website: [email or web address]. By joining [employer]’s FollowMyHealth portal, you will also be able to view your health information using other applications (apps) compatible with our system.

## 2019-11-19 NOTE — PROGRESS NOTE ADULT - SUBJECTIVE AND OBJECTIVE BOX
Patient is a 79y old  Female who presents with a chief complaint of cough, shortness of breath (18 Nov 2019 10:58)      INTERVAL HPI/OVERNIGHT EVENTS: Patient states she still feels very weak with unimproved cough. Getting winded after cough. Had dyspnea after going to bathroom and washing herself up. Anxious. No fevers or chills. Has decreased PO intake.     Wt(kg): --  I&O's Summary      LABS:                        12.2   10.57 )-----------( 408      ( 19 Nov 2019 08:14 )             37.7     11-19    144  |  106  |  10  ----------------------------<  123<H>  3.5   |  26  |  0.70    Ca    8.8      19 Nov 2019 08:14  Phos  2.8     11-19  Mg     1.5     11-19    TPro  7.6  /  Alb  3.3  /  TBili  0.4  /  DBili  x   /  AST  25  /  ALT  18  /  AlkPhos  101  11-18    PT/INR - ( 18 Nov 2019 06:54 )   PT: 11.4 sec;   INR: 1.04 ratio         PTT - ( 18 Nov 2019 06:54 )  PTT:31.1 sec    CAPILLARY BLOOD GLUCOSE      POCT Blood Glucose.: 121 mg/dL (19 Nov 2019 08:04)  POCT Blood Glucose.: 189 mg/dL (18 Nov 2019 21:25)  POCT Blood Glucose.: 102 mg/dL (18 Nov 2019 17:03)  POCT Blood Glucose.: 108 mg/dL (18 Nov 2019 13:36)            MEDICATIONS  (STANDING):  ALBUTerol    0.083% 2.5 milliGRAM(s) Nebulizer every 6 hours  amLODIPine   Tablet 10 milliGRAM(s) Oral daily  aspirin enteric coated 325 milliGRAM(s) Oral daily  cholecalciferol 1000 Unit(s) Oral daily  dextrose 5%. 1000 milliLiter(s) (50 mL/Hr) IV Continuous <Continuous>  dextrose 50% Injectable 12.5 Gram(s) IV Push once  dextrose 50% Injectable 25 Gram(s) IV Push once  dextrose 50% Injectable 25 Gram(s) IV Push once  enoxaparin Injectable 40 milliGRAM(s) SubCutaneous at bedtime  hydrALAZINE 25 milliGRAM(s) Oral two times a day  insulin lispro (HumaLOG) corrective regimen sliding scale   SubCutaneous three times a day before meals  insulin lispro (HumaLOG) corrective regimen sliding scale   SubCutaneous at bedtime  losartan 50 milliGRAM(s) Oral every 12 hours  magnesium sulfate  IVPB 2 Gram(s) IV Intermittent once  pantoprazole    Tablet 40 milliGRAM(s) Oral before breakfast  simvastatin 40 milliGRAM(s) Oral at bedtime    MEDICATIONS  (PRN):  dextrose 40% Gel 15 Gram(s) Oral once PRN Blood Glucose LESS THAN 70 milliGRAM(s)/deciliter  glucagon  Injectable 1 milliGRAM(s) IntraMuscular once PRN Glucose LESS THAN 70 milligrams/deciliter  guaiFENesin   Syrup  (Sugar-Free) 200 milliGRAM(s) Oral every 6 hours PRN Cough      REVIEW OF SYSTEMS:  CONSTITUTIONAL: No fever, weight loss, or fatigue  EYES: No eye pain, visual disturbances, or discharge  ENMT:  No difficulty hearing, tinnitus, vertigo; No sinus or throat pain  NECK: No pain or stiffness  RESPIRATORY: Admits persistent cough, has dyspnea with prolonged cough  CARDIOVASCULAR: No chest pain, palpitations, dizziness, or leg swelling  GASTROINTESTINAL: No abdominal or epigastric pain. No nausea, vomiting, or hematemesis; No diarrhea or constipation. No melena or hematochezia.  GENITOURINARY: No dysuria, frequency, hematuria, or incontinence  NEUROLOGICAL: No headaches, memory loss, loss of strength, numbness, or tremors  SKIN: No itching, burning, rashes, or lesions   LYMPH NODES: No enlarged glands  ENDOCRINE: No heat or cold intolerance; No hair loss  MUSCULOSKELETAL: admits to weakness  PSYCHIATRIC: admits anxiety  HEME/LYMPH: No easy bruising, or bleeding gums  ALLERY AND IMMUNOLOGIC: No hives or eczema    RADIOLOGY & ADDITIONAL TESTS:    Imaging Personally Reviewed:  [x] YES  [ ] NO    Consultant(s) Notes Reviewed:  [x] YES  [ ] NO    PHYSICAL EXAM:  T(C): 36.7 (11-19-19 @ 05:36), Max: 37.1 (11-18-19 @ 12:01)  HR: 74 (11-19-19 @ 07:41) (60 - 89)  BP: 146/76 (11-19-19 @ 05:36) (114/57 - 146/76)  RR: 18 (11-19-19 @ 05:36) (15 - 18)  SpO2: 95% (11-19-19 @ 07:41) (92% - 97%)    GENERAL: NAD, well-groomed, well-developed  HEAD:  Atraumatic, Normocephalic  EYES: EOMI, PERRLA, conjunctiva and sclera clear  ENMT: No tonsillar erythema, exudates, or enlargement; Moist mucous membranes, poor dentition  NECK: Supple, No JVD, Normal thyroid  NERVOUS SYSTEM:  Alert & Oriented X3, Good concentration  CHEST/LUNG: breath sounds clear, no wheezing or rhonchi  HEART: Regular rate and rhythm; No murmurs, rubs, or gallops  ABDOMEN: Soft, Nontender, Nondistended; Bowel sounds present  EXTREMITIES:  2+ Peripheral Pulses, No clubbing, cyanosis, or edema  LYMPH: No lymphadenopathy noted  SKIN: No rashes or lesions    Care Discussed with Consultants/Other Providers [x] YES  [ ] NO

## 2019-11-19 NOTE — CONSULT NOTE ADULT - PROBLEM SELECTOR RECOMMENDATION 9
anxious - sterling - cough -  ct chest - non contr - no pulm path  leukocytosis - unclear etiol -   will check D dimer and LE doppler and VBG  pt is on RA -   vs and HD and Sat reviewed  medical hx reviewed  lung clear on exam  if D dimer and VBG and LE doppler neg - normal - will prob GO HOME  will need follow up with Dr. Boston - will need repeat CBC - WBC repeat  reassurance and emotional support provided

## 2019-11-19 NOTE — CONSULT NOTE ADULT - SUBJECTIVE AND OBJECTIVE BOX
Date/Time Patient Seen:  		  Referring MD:   Data Reviewed	       Patient is a 79y old  Female who presents with a chief complaint of cough, shortness of breath (19 Nov 2019 09:59)      Subjective/HPI    in bed  seen and examined  vs and meds reviewed  labs reviewed  H and P reviewed  pt reports cough and sob and anxiety  lives with son  ambulatory  drives    History and Physical:   Source of Information	Chart(s), Patient  Outpatient Providers	PMD Dr. Boston (previously Dr. Weil)  Cardio Dr. Johnson     Language:  · Patient/Family of Limited English Proficiency	No     Patient Identity:  · Birth Sex	Unknown       History of Present Illness:  Reason for Admission: cough, shortness of breath  History of Present Illness:   78 y/o F with PMHx of CAD, depression, GERD, PUD, HLD, Prediabetes (on metformin), thyroid CA s/p subtotal thyroidectomy presents with a 2 week history of cough. She states that the cough leads to a sensation of shortness of breath but no shortness of breath at rest. Nebulizer treatments have improved her symptoms here in the ER. She states that she was diagnosed with bronchitis on 11/12 and placed on augmentin, but had symptoms 5 days prior to this. She reports no difficulty swallowing but after eating or drinking does have fear of triggering cough. No choking on food or water. She reports no fevers or myalgias. No sick contacts. Admits to generalized weakness. She is concerned for dehydration, as she has been hospitalized a few times for this. Denies chest pain, palpitations, leg swelling, headache, dizziness, abdominal pain, dysuria, hematuria, n/v/d. Admits to chronic stress incontinence. She states that she took OTC robitussin for her cough, which usually helps but this time did not. Cough is productive of phlegm. Also causes left sided rib pain with forceful coughing.  In the ED, VS stable normal oxygenation on room air. Labs significant for WBC 12.19, Lactate 3.0 to 2.4, Mg 1.5, K 3.2. CXR was clear no infiltrates. CT chest without evidence of pneumonia or acute process. EKG NSR at 64bpm. Labs, imaging, EKG all personally reviewed.    EXAM:  CT CHEST                                  PROCEDURE DATE:  11/18/2019          INTERPRETATION:  CLINICAL INFORMATION: Productive cough    COMPARISON: 4/1/2016    PROCEDURE:   CT of the Chest was performed without intravenous contrast.  Sagittal and coronal reformats were performed.      FINDINGS:    LUNGS AND AIRWAYS: Patent central airways.  Lungs are clear.    PLEURA: No pleural effusion.    MEDIASTINUM AND [place]: No lymphadenopathy. Lack of IV contrast limits   hilar evaluation. Moderate hiatal hernia.    VESSELS: Nonaneurysmal.    HEART: Heart size is normal with coronary artery calcification. No   pericardial effusion.    CHEST WALL AND LOWER NECK: Within normal limits.    VISUALIZED UPPER ABDOMEN: Calcified gallstones. Bilateral nonobstructive   renal calculi    BONES: Senescent changes thoracic spine. Stable chronic lower thoracic   compression deformity.    IMPRESSION:     No specific acute pathology on this noncontrast study                      MELANI ROSALES M.D., ATTENDING RADIOLOGIST  This document has been electronically signed. Nov 18 2019  9:27AM                 PAST MEDICAL & SURGICAL HISTORY:  GERD (gastroesophageal reflux disease)  CAD (coronary atherosclerotic disease)  Prediabetes  Thyroid cancer  Depression  High cholesterol  Hypertension  S/P subtotal thyroidectomy  S/P spinal surgery  H/O knee surgery: right        Medication list         MEDICATIONS  (STANDING):  ALBUTerol    0.083% 2.5 milliGRAM(s) Nebulizer every 6 hours  amLODIPine   Tablet 10 milliGRAM(s) Oral daily  aspirin enteric coated 325 milliGRAM(s) Oral daily  cholecalciferol 1000 Unit(s) Oral daily  dextrose 5%. 1000 milliLiter(s) (50 mL/Hr) IV Continuous <Continuous>  dextrose 50% Injectable 12.5 Gram(s) IV Push once  dextrose 50% Injectable 25 Gram(s) IV Push once  dextrose 50% Injectable 25 Gram(s) IV Push once  enoxaparin Injectable 40 milliGRAM(s) SubCutaneous at bedtime  hydrALAZINE 25 milliGRAM(s) Oral two times a day  insulin lispro (HumaLOG) corrective regimen sliding scale   SubCutaneous three times a day before meals  insulin lispro (HumaLOG) corrective regimen sliding scale   SubCutaneous at bedtime  losartan 50 milliGRAM(s) Oral every 12 hours  magnesium sulfate  IVPB 2 Gram(s) IV Intermittent once  pantoprazole    Tablet 40 milliGRAM(s) Oral before breakfast  simvastatin 40 milliGRAM(s) Oral at bedtime    MEDICATIONS  (PRN):  dextrose 40% Gel 15 Gram(s) Oral once PRN Blood Glucose LESS THAN 70 milliGRAM(s)/deciliter  glucagon  Injectable 1 milliGRAM(s) IntraMuscular once PRN Glucose LESS THAN 70 milligrams/deciliter  guaiFENesin   Syrup  (Sugar-Free) 200 milliGRAM(s) Oral every 6 hours PRN Cough         Vitals log        ICU Vital Signs Last 24 Hrs  T(C): 36.7 (19 Nov 2019 05:36), Max: 37.1 (18 Nov 2019 12:01)  T(F): 98.1 (19 Nov 2019 05:36), Max: 98.7 (18 Nov 2019 12:01)  HR: 74 (19 Nov 2019 07:41) (67 - 89)  BP: 146/76 (19 Nov 2019 05:36) (114/57 - 146/76)  BP(mean): --  ABP: --  ABP(mean): --  RR: 18 (19 Nov 2019 05:36) (16 - 18)  SpO2: 95% (19 Nov 2019 07:41) (92% - 97%)           Input and Output:  I&O's Detail      Lab Data                        12.2   10.57 )-----------( 408      ( 19 Nov 2019 08:14 )             37.7     11-19    144  |  106  |  10  ----------------------------<  123<H>  3.5   |  26  |  0.70    Ca    8.8      19 Nov 2019 08:14  Phos  2.8     11-19  Mg     1.5     11-19    TPro  7.6  /  Alb  3.3  /  TBili  0.4  /  DBili  x   /  AST  25  /  ALT  18  /  AlkPhos  101  11-18            Review of Systems	  anxious    Objective     Physical Examination  head nc  heart s1s2  lung dec BS  abd soft  cn grossly int  moves all extr  on room air        Pertinent Lab findings & Imaging      Smith:  NO   Adequate UO     I&O's Detail           Discussed with:     Cultures:	        Radiology  ct chest no pulm path

## 2019-11-19 NOTE — PROGRESS NOTE ADULT - ASSESSMENT
80 y/o F with PMHx of CAD, depression, anxiety, GERD, PUD, HLD, Prediabetes (on metformin), thyroid CA s/p subtotal thyroidectomy admitted with bronchitis refractory to outpatient management and electrolyte abnormalities.    Bronchitis/Cough  - bronchodilators q6 for now, antitussives PRN  - s/p outpatient course of augmentin for 6 days  - s/p rocephin/azithro and 2.5L NS bolus in ER  - CXR and CT without evidence of pneumonia  -supportive treatment for now  - monitor for fevers, tylenol PRN  - flu/RSV neg, f/u blood/urine cx, RVP, procalcitonin  - saturating well on room air, supplemental O2 as needed  - Pulm Consult Dr. Coon  - dc planning    Weakness  - suspect from prolonged bronchitis and decreased PO intake  - supportive therapy, encourage PO intake  - ambulate with assistance    Electrolyte abnormalities  - hypokalemia and hypomagnesemia on admission  - repleted with PO potassium and magnesium in ER  - monitor BMP, Mg and Phos; replete electrolytes as needed  - IV mag sulfate 2g today    GERD/PUD  - continue PPI    CAD  - continue ASA 325mg and statin    HTN  - continue hydralazine 25mg twice a day  - continue amlodipine 10mg daily  - continue losartan 50mg twice a day  - monitor hemodynamics    Prediabetes/DM2  - on metformin ER 500mg twice a day at home, hold here  - start on low dose insulin corrective scale  - monitor blood glucose, hypoglycemia protocol  - check hgb A1C in AM    Need for prophylactic measure  - DVT ppx with lovenox    IMPROVE VTE Individual Risk Assessment          RISK                                                          Points  [  ] Previous VTE                                                3  [  ] Thrombophilia                                             2  [  ] Lower limb paralysis                                   2        (unable to hold up >15 seconds)    [  ] Current Cancer                                             2         (within 6 months)  [ x ] Immobilization > 24 hrs    (anticipated)                 1  [  ] ICU/CCU stay > 24 hours                             1  [ x ] Age > 60                                                         1    IMPROVE VTE Score: 2

## 2019-11-20 ENCOUNTER — APPOINTMENT (OUTPATIENT)
Dept: INTERNAL MEDICINE | Facility: CLINIC | Age: 79
End: 2019-11-20
Payer: MEDICARE

## 2019-11-20 VITALS
SYSTOLIC BLOOD PRESSURE: 150 MMHG | DIASTOLIC BLOOD PRESSURE: 66 MMHG | HEART RATE: 76 BPM | RESPIRATION RATE: 18 BRPM | BODY MASS INDEX: 28.72 KG/M2 | OXYGEN SATURATION: 98 % | HEIGHT: 61 IN | TEMPERATURE: 98.6 F | WEIGHT: 152.13 LBS

## 2019-11-20 PROBLEM — R73.03 PREDIABETES: Chronic | Status: ACTIVE | Noted: 2019-11-18

## 2019-11-20 PROBLEM — K21.9 GASTRO-ESOPHAGEAL REFLUX DISEASE WITHOUT ESOPHAGITIS: Chronic | Status: ACTIVE | Noted: 2019-11-18

## 2019-11-20 PROBLEM — I25.10 ATHEROSCLEROTIC HEART DISEASE OF NATIVE CORONARY ARTERY WITHOUT ANGINA PECTORIS: Chronic | Status: ACTIVE | Noted: 2019-11-18

## 2019-11-20 PROBLEM — F32.9 MAJOR DEPRESSIVE DISORDER, SINGLE EPISODE, UNSPECIFIED: Chronic | Status: ACTIVE | Noted: 2019-11-18

## 2019-11-20 PROBLEM — C73 MALIGNANT NEOPLASM OF THYROID GLAND: Chronic | Status: ACTIVE | Noted: 2019-11-18

## 2019-11-20 PROCEDURE — 99214 OFFICE O/P EST MOD 30 MIN: CPT

## 2019-11-21 NOTE — HEALTH RISK ASSESSMENT
[No] : In the past 12 months have you used drugs other than those required for medical reasons? No [1] : 2) Feeling down, depressed, or hopeless for several days (1) [] : No [[id number]] : 2

## 2019-11-21 NOTE — ADDENDUM
[FreeTextEntry1] : I, Kevin Castaneda, acted solely as scribe for Dr. Edu Boston DO on this date 11/20/2019 12:10PM .\par \par All medical record entries made by the Scribe were at my, Dr. Edu Boston DO direction and personally dictated by me on 11/20/2019 12:10PM. I have reviewed the chart and agree that the record accurately reflects my personal performance of the history, physical exam, assessment and plan. I have also personally directed, reviewed and agreed with the chart.\par

## 2019-11-21 NOTE — HISTORY OF PRESENT ILLNESS
[FreeTextEntry1] : hospital follow-up  [de-identified] : Patient is a 79 year old female with a past medical history as below who presents for a hospital follow-up. Patient was admitted to [facility] from 11/18/19 - 11/19/19 as she had noted difficulty breathing. Blood work done on 11/18 revealed WBC (12.91) and PLT (479). Blood work done on 11/19 revealed WBC (10.57), PLT (408), and elevated D-dimer (393). CT scan of the chest on 11/18 was negative. Venous Doppler done on 11/19 was negative. Blood cultures were negative. Patient was not discharged on antibiotics as the physician she saw at the hospital believes symptoms are indicative of a viral bronchitis. She currently notes a cough, expectorating phlegm, and difficulty catching her breath. She has not started Guaifenesin or the inhaler she was prescribed since being discharged. Patient requests a copy of last blood work for psychiatrist, Dr. Thibodeaux.

## 2019-11-21 NOTE — PHYSICAL EXAM
[No Acute Distress] : no acute distress [Well Nourished] : well nourished [Well Developed] : well developed [Well-Appearing] : well-appearing [Normal Sclera/Conjunctiva] : normal sclera/conjunctiva [PERRL] : pupils equal round and reactive to light [EOMI] : extraocular movements intact [Normal Outer Ear/Nose] : the outer ears and nose were normal in appearance [Normal Oropharynx] : the oropharynx was normal [No JVD] : no jugular venous distention [No Lymphadenopathy] : no lymphadenopathy [Supple] : supple [Thyroid Normal, No Nodules] : the thyroid was normal and there were no nodules present [No Respiratory Distress] : no respiratory distress  [No Accessory Muscle Use] : no accessory muscle use [Clear to Auscultation] : lungs were clear to auscultation bilaterally [Normal Rate] : normal rate  [Regular Rhythm] : with a regular rhythm [Normal S1, S2] : normal S1 and S2 [No Murmur] : no murmur heard [No Carotid Bruits] : no carotid bruits [No Abdominal Bruit] : a ~M bruit was not heard ~T in the abdomen [No Varicosities] : no varicosities [No Edema] : there was no peripheral edema [Pedal Pulses Present] : the pedal pulses are present [No Palpable Aorta] : no palpable aorta [No Extremity Clubbing/Cyanosis] : no extremity clubbing/cyanosis [Soft] : abdomen soft [Non Tender] : non-tender [Non-distended] : non-distended [No Masses] : no abdominal mass palpated [No HSM] : no HSM [Normal Bowel Sounds] : normal bowel sounds [Normal Posterior Cervical Nodes] : no posterior cervical lymphadenopathy [Normal Anterior Cervical Nodes] : no anterior cervical lymphadenopathy [No CVA Tenderness] : no CVA  tenderness [No Spinal Tenderness] : no spinal tenderness [Grossly Normal Strength/Tone] : grossly normal strength/tone [No Joint Swelling] : no joint swelling [Coordination Grossly Intact] : coordination grossly intact [No Rash] : no rash [Deep Tendon Reflexes (DTR)] : deep tendon reflexes were 2+ and symmetric [Normal Gait] : normal gait [No Focal Deficits] : no focal deficits [Normal Affect] : the affect was normal [Normal Insight/Judgement] : insight and judgment were intact [de-identified] : overweight

## 2019-11-21 NOTE — ASSESSMENT
[FreeTextEntry1] : Patient is a 79 year old female with a past medical history as above who presents for hospital follow-up.

## 2019-11-21 NOTE — PLAN
[FreeTextEntry1] : Pulmonary\par cough/expectorating mucus - secondary to viral bronchitis - discontinue Augmentin; Patient to start Guaifenesin and inhaler \par Cardiology\par hypertension - continue Losartan Potassium 50mg BID p.o.q.d., Hydralazine HCl 25mg BID p.o.q.d., and Amlodipine Besylate 10mg p.o.q.d. - continue low sodium diet\par continue Aspirin 325mg p.o.q.d. given history of ASCAD - continue to follow up with cardiologist, Dr. Johnson\par Endocrinology\par hyperlipidemia - continue Simvastatin 40mg p.o.q.h.s. - continue low cholesterol/low fat diet \par pre-diabetes (hyperglycemia) - continue Metformin HCl ER 500mg BID p.o.q.d. - continue low carbohydrate/low sugar diet \par vitamin D deficiency- continue vitamin D 1000 unit tablets p.o.q.d with meals\par Psychiatry\par history of anxiety - continue Sertraline HCl 25mg p.o.q.d. - continue to follow up with psychiatrist, Dr. Thibodeaux\par Gastroenterology\par GERD - continue Omeprazole 20mg p.o.q.d. - continue antireflux measures\par \par Copy of recent blood work provided for patient per her request.

## 2019-11-21 NOTE — REVIEW OF SYSTEMS
[Negative] : Heme/Lymph [Shortness Of Breath] : shortness of breath [Cough] : cough [FreeTextEntry4] : expectorating mucus

## 2019-11-23 LAB
CULTURE RESULTS: SIGNIFICANT CHANGE UP
CULTURE RESULTS: SIGNIFICANT CHANGE UP
SPECIMEN SOURCE: SIGNIFICANT CHANGE UP
SPECIMEN SOURCE: SIGNIFICANT CHANGE UP

## 2019-11-25 ENCOUNTER — APPOINTMENT (OUTPATIENT)
Dept: INTERNAL MEDICINE | Facility: CLINIC | Age: 79
End: 2019-11-25
Payer: MEDICARE

## 2019-11-25 VITALS
WEIGHT: 151 LBS | HEART RATE: 85 BPM | DIASTOLIC BLOOD PRESSURE: 70 MMHG | RESPIRATION RATE: 16 BRPM | SYSTOLIC BLOOD PRESSURE: 148 MMHG | OXYGEN SATURATION: 98 % | BODY MASS INDEX: 28.51 KG/M2 | TEMPERATURE: 98.5 F | HEIGHT: 61 IN

## 2019-11-25 PROCEDURE — 99213 OFFICE O/P EST LOW 20 MIN: CPT

## 2019-11-25 NOTE — ASSESSMENT
[FreeTextEntry1] : Patient is a 79 year old female with a past medical history as above who presents with bronchitis.

## 2019-11-25 NOTE — HEALTH RISK ASSESSMENT
[Yes] : Yes [Never (0 pts)] : Never (0 points) [1 or 2 (0 pts)] : 1 or 2 (0 points) [Monthly or less (1 pt)] : Monthly or less (1 point) [No] : In the past 12 months have you used drugs other than those required for medical reasons? No [2] : 1) Little interest or pleasure doing things for more than half of the days (2) [1] : 2) Feeling down, depressed, or hopeless for several days (1) [] : No [[id number]] : 3 [Audit-CScore] : 1

## 2019-11-25 NOTE — HISTORY OF PRESENT ILLNESS
[FreeTextEntry1] : cough [de-identified] : Patient is a 79 year old female with a past medical history as below who presents with a cough that has persisted since her last visit. She also notes expectorating white/clear mucus, but denies hemoptysis. She denies fever or chills. Patient states she is taking all medications as prescribed and denies any adverse reactions or side effects.

## 2019-11-25 NOTE — ADDENDUM
[FreeTextEntry1] : I, Kevin Castaneda, acted solely as scribe for Dr. Edu Boston DO on this date 11/25/2019  2:30PM .\par \par All medical record entries made by the Scribe were at my, Dr. Edu Boston DO direction and personally dictated by me on 11/25/2019  2:30PM. I have reviewed the chart and agree that the record accurately reflects my personal performance of the history, physical exam, assessment and plan. I have also personally directed, reviewed and agreed with the chart.\par

## 2019-11-25 NOTE — PLAN
[FreeTextEntry1] : Pulmonary\par cough/expectorating mucus -  secondary to bronchitis - start Levofloxacin 500mg p.o.q.d. as directed for 10 days, Rx filled, and Methylprednisolone 4mg p.o. as directed, Rx filled (patient to start Medrol pack tomorrow with breakfast) - advised patient to start Probiotic (Align or Culturelle) - patient to follow up next week\par continue Guaifenesin 100mg/5mL as directed and Albuterol Sulfate  MCG/ACT p.r.n. as directed\par Cardiology\par hypertension - continue Losartan Potassium 50mg BID p.o.q.d., Hydralazine HCl 25mg BID p.o.q.d., and Amlodipine Besylate 10mg p.o.q.d. - continue low sodium diet\par continue Aspirin 325mg p.o.q.d. given history of ASCAD - continue to follow up with cardiologist, Dr. Johnson\par Endocrinology\par hyperlipidemia - continue Simvastatin 40mg p.o.q.h.s. - continue low cholesterol/low fat diet \par pre-diabetes (hyperglycemia) - continue Metformin HCl ER 500mg BID p.o.q.d. - continue low carbohydrate/low sugar diet \par vitamin D deficiency - continue vitamin D 1000 unit tablets p.o.q.d with meals\par Psychiatry\par history of anxiety - continue Sertraline HCl 25mg p.o.q.d. - continue to follow up with psychiatrist, Dr. Thibodeaux\par Gastroenterology\par GERD - continue Omeprazole 20mg p.o.q.d. - continue antireflux measures\par \par Patient to follow up in 1 week.

## 2019-11-25 NOTE — PHYSICAL EXAM
[No Acute Distress] : no acute distress [Well Nourished] : well nourished [Well Developed] : well developed [Well-Appearing] : well-appearing [PERRL] : pupils equal round and reactive to light [Normal Sclera/Conjunctiva] : normal sclera/conjunctiva [Normal Outer Ear/Nose] : the outer ears and nose were normal in appearance [EOMI] : extraocular movements intact [No JVD] : no jugular venous distention [No Lymphadenopathy] : no lymphadenopathy [Supple] : supple [Thyroid Normal, No Nodules] : the thyroid was normal and there were no nodules present [No Accessory Muscle Use] : no accessory muscle use [No Respiratory Distress] : no respiratory distress  [Normal Rate] : normal rate  [Regular Rhythm] : with a regular rhythm [No Murmur] : no murmur heard [Normal S1, S2] : normal S1 and S2 [No Carotid Bruits] : no carotid bruits [No Varicosities] : no varicosities [No Abdominal Bruit] : a ~M bruit was not heard ~T in the abdomen [No Palpable Aorta] : no palpable aorta [No Edema] : there was no peripheral edema [Pedal Pulses Present] : the pedal pulses are present [Non Tender] : non-tender [Soft] : abdomen soft [No Extremity Clubbing/Cyanosis] : no extremity clubbing/cyanosis [Non-distended] : non-distended [No Masses] : no abdominal mass palpated [Normal Posterior Cervical Nodes] : no posterior cervical lymphadenopathy [No HSM] : no HSM [Normal Bowel Sounds] : normal bowel sounds [Normal Anterior Cervical Nodes] : no anterior cervical lymphadenopathy [No Spinal Tenderness] : no spinal tenderness [No CVA Tenderness] : no CVA  tenderness [No Joint Swelling] : no joint swelling [Grossly Normal Strength/Tone] : grossly normal strength/tone [No Rash] : no rash [Coordination Grossly Intact] : coordination grossly intact [Normal Gait] : normal gait [No Focal Deficits] : no focal deficits [Deep Tendon Reflexes (DTR)] : deep tendon reflexes were 2+ and symmetric [Normal Affect] : the affect was normal [Normal Insight/Judgement] : insight and judgment were intact [de-identified] : post-nasal drip  [de-identified] : mild upper airway rhonchi  [de-identified] : overweight

## 2019-12-02 ENCOUNTER — APPOINTMENT (OUTPATIENT)
Dept: INTERNAL MEDICINE | Facility: CLINIC | Age: 79
End: 2019-12-02
Payer: MEDICARE

## 2019-12-02 VITALS
HEIGHT: 61 IN | SYSTOLIC BLOOD PRESSURE: 138 MMHG | RESPIRATION RATE: 14 BRPM | BODY MASS INDEX: 28.51 KG/M2 | OXYGEN SATURATION: 96 % | HEART RATE: 88 BPM | DIASTOLIC BLOOD PRESSURE: 68 MMHG | TEMPERATURE: 98.1 F | WEIGHT: 151 LBS

## 2019-12-02 PROCEDURE — 99214 OFFICE O/P EST MOD 30 MIN: CPT

## 2019-12-02 NOTE — HEALTH RISK ASSESSMENT
[No] : In the past 12 months have you used drugs other than those required for medical reasons? No [1] : 2) Feeling down, depressed, or hopeless for several days (1) [] : No [Audit-CScore] : 0 [[id number]] : 2

## 2019-12-02 NOTE — PHYSICAL EXAM
[No Acute Distress] : no acute distress [Well Nourished] : well nourished [Well Developed] : well developed [Normal Sclera/Conjunctiva] : normal sclera/conjunctiva [Well-Appearing] : well-appearing [PERRL] : pupils equal round and reactive to light [EOMI] : extraocular movements intact [Normal Outer Ear/Nose] : the outer ears and nose were normal in appearance [Normal Oropharynx] : the oropharynx was normal [No Lymphadenopathy] : no lymphadenopathy [No JVD] : no jugular venous distention [Supple] : supple [Thyroid Normal, No Nodules] : the thyroid was normal and there were no nodules present [No Respiratory Distress] : no respiratory distress  [Clear to Auscultation] : lungs were clear to auscultation bilaterally [No Accessory Muscle Use] : no accessory muscle use [Normal Rate] : normal rate  [Regular Rhythm] : with a regular rhythm [Normal S1, S2] : normal S1 and S2 [No Murmur] : no murmur heard [No Carotid Bruits] : no carotid bruits [No Varicosities] : no varicosities [No Abdominal Bruit] : a ~M bruit was not heard ~T in the abdomen [Pedal Pulses Present] : the pedal pulses are present [No Edema] : there was no peripheral edema [No Extremity Clubbing/Cyanosis] : no extremity clubbing/cyanosis [No Palpable Aorta] : no palpable aorta [Soft] : abdomen soft [Non Tender] : non-tender [Non-distended] : non-distended [No Masses] : no abdominal mass palpated [No HSM] : no HSM [Normal Bowel Sounds] : normal bowel sounds [Normal Posterior Cervical Nodes] : no posterior cervical lymphadenopathy [Normal Anterior Cervical Nodes] : no anterior cervical lymphadenopathy [No CVA Tenderness] : no CVA  tenderness [No Spinal Tenderness] : no spinal tenderness [No Joint Swelling] : no joint swelling [Grossly Normal Strength/Tone] : grossly normal strength/tone [No Rash] : no rash [No Focal Deficits] : no focal deficits [Coordination Grossly Intact] : coordination grossly intact [Normal Gait] : normal gait [Normal Affect] : the affect was normal [Deep Tendon Reflexes (DTR)] : deep tendon reflexes were 2+ and symmetric [Normal Insight/Judgement] : insight and judgment were intact [de-identified] : overweight

## 2019-12-02 NOTE — PLAN
[FreeTextEntry1] : Pulmonary\par cough/expectorating mucus - secondary to resolving bronchitis - lungs clear - continue Levofloxacin 500mg p.o.q.d. as directed until finished\par Gastroenterology\par dysphagia to solids/liquids - referred to gastroenterologist, Dr. Sands for further evaluation \par GERD - continue Omeprazole 20mg p.o.q.d. - continue antireflux measures\par Cardiology\par hypertension - continue Losartan Potassium 50mg BID p.o.q.d., Hydralazine HCl 25mg BID p.o.q.d., and Amlodipine Besylate 10mg p.o.q.d. - continue low sodium diet\par continue Aspirin 325mg p.o.q.d. given history of ASCAD - continue to follow up with cardiologist, Dr. Johnson\par Endocrinology\par hyperlipidemia - continue Simvastatin 40mg p.o.q.h.s. - continue low cholesterol/low fat diet \par pre-diabetes (hyperglycemia) - continue Metformin HCl ER 500mg BID p.o.q.d. - continue low carbohydrate/low sugar diet \par vitamin D deficiency - continue vitamin D 1000 unit tablets p.o.q.d with meals\par Psychiatry\par history of anxiety - continue Sertraline HCl 25mg p.o.q.d. - continue to follow up with psychiatrist, Dr. Thibodeaux\par \par

## 2019-12-02 NOTE — ADDENDUM
[FreeTextEntry1] : I, Kevin Castaneda, acted solely as scribe for Dr. Edu Boston DO on this date 12/02/2019  2:20PM .\par \par All medical record entries made by the Scribe were at my, Dr. Edu Boston DO direction and personally dictated by me on 12/02/2019  2:20PM. I have reviewed the chart and agree that the record accurately reflects my personal performance of the history, physical exam, assessment and plan. I have also personally directed, reviewed and agreed with the chart.\par

## 2019-12-02 NOTE — REVIEW OF SYSTEMS
[Cough] : cough [Negative] : Psychiatric [FreeTextEntry4] : expectorating mucus  [FreeTextEntry7] : dysphagia to solids/liquids

## 2019-12-02 NOTE — ASSESSMENT
[FreeTextEntry1] : Patient is a 79 year old female with a past medical history as above who presents with a resolving bronchitis.

## 2019-12-02 NOTE — HISTORY OF PRESENT ILLNESS
[FreeTextEntry1] : follow-up for bronchitis  [de-identified] : Patient is a 79 year old female with a past medical history as below who presents for a follow-up for bronchitis. Patient was started on Levofloxacin and Methylprednisolone on 11/25/19. She notes finishing the Medrol pack yesterday. Patient still notes a cough and expectorating mucus. She also notes dysphagia to solids and liquids. Patient states she is taking all medications as prescribed and denies any adverse reactions or side effects. She denies lightheadedness or dizziness on blood pressure medications.

## 2019-12-04 ENCOUNTER — APPOINTMENT (OUTPATIENT)
Dept: GASTROENTEROLOGY | Facility: CLINIC | Age: 79
End: 2019-12-04
Payer: MEDICARE

## 2019-12-04 ENCOUNTER — OTHER (OUTPATIENT)
Age: 79
End: 2019-12-04

## 2019-12-04 ENCOUNTER — MOBILE ON CALL (OUTPATIENT)
Age: 79
End: 2019-12-04

## 2019-12-04 VITALS
OXYGEN SATURATION: 95 % | SYSTOLIC BLOOD PRESSURE: 151 MMHG | BODY MASS INDEX: 28.32 KG/M2 | DIASTOLIC BLOOD PRESSURE: 78 MMHG | HEIGHT: 61 IN | WEIGHT: 150 LBS | HEART RATE: 86 BPM

## 2019-12-04 DIAGNOSIS — Z87.01 PERSONAL HISTORY OF PNEUMONIA (RECURRENT): ICD-10-CM

## 2019-12-04 DIAGNOSIS — Z87.19 PERSONAL HISTORY OF OTHER DISEASES OF THE DIGESTIVE SYSTEM: ICD-10-CM

## 2019-12-04 PROCEDURE — 99203 OFFICE O/P NEW LOW 30 MIN: CPT

## 2019-12-04 NOTE — REASON FOR VISIT
[Initial Evaluation] : an initial evaluation [FreeTextEntry1] : Dysphasia, cough, recent bronchitis, and history of pneumonia

## 2019-12-04 NOTE — REVIEW OF SYSTEMS
[Feeling Tired] : feeling tired [Feeling Poorly] : feeling poorly [As Noted in HPI] : as noted in HPI [Negative] : Heme/Lymph

## 2019-12-04 NOTE — ASSESSMENT
[FreeTextEntry1] : Impression\par \par Dysphagia more to solids and liquids\par \par Cough\par \par Recent bronchitis with concern that the patient might be aspirating\par \par Recent pneumonia\par \par Suggest\par \par Barium esophagram and upper GI series\par \par Cardiac clearance\par \par Past the patient is here pulmonary doctor also get clearance\par \par Anesthesia clearance\par \par If cleared by all of the above, then an upper endoscopy might be wise also\par \par Certainly call me of followup at any time\par Risks/benefits:\par The procedure, the risks and benefits and alternatives have been reviewed in great detail with the patient.  Risks including, but not limited to sedation such as cardiac and pulmonary compromise, the procedure itself such as bleeding requiring hospitalization, transfusion, surgery, temporary or permanent colostomy.  Perforation or puncture of the requiring hospitalization, surgery, temporary colostomy.\par \par The patient expresses understanding of the procedure and consents to undergoing the procedure.\par \par

## 2019-12-04 NOTE — HISTORY OF PRESENT ILLNESS
[de-identified] : Dr. Boston Takes care of this very pleasant 79-year-old female\par \par She has recent bronchitis\par \par She has history of pneumonia\par \par She's having some difficulty swallowing at times\par \par Swallowing is sometimes difficult with solids, sometimes liquids\par \par Her appetite is poor\par \par She's lost a few pounds\par \par She denies heartburn\par \par She is followed by cardiology Dr. Johnson\par \par She is not seen the pulmonary doctor recently

## 2019-12-04 NOTE — PHYSICAL EXAM
ABG results given to Dr. Ale GUTIERREZ at 0455, increased peep to 12 cmH2O. [General Appearance - Alert] : alert [General Appearance - In No Acute Distress] : in no acute distress [General Appearance - Well Nourished] : well nourished [General Appearance - Well Developed] : well developed [General Appearance - Well-Appearing] : healthy appearing [Sclera] : the sclera and conjunctiva were normal [Neck Appearance] : the appearance of the neck was normal [Neck Cervical Mass (___cm)] : no neck mass was observed [Jugular Venous Distention Increased] : there was no jugular-venous distention [Respiration, Rhythm And Depth] : normal respiratory rhythm and effort [Heart Rate And Rhythm] : heart rate was normal and rhythm regular [Exaggerated Use Of Accessory Muscles For Inspiration] : no accessory muscle use [Apical Impulse] : the apical impulse was normal [Full Pulse] : the pedal pulses are present [Edema] : there was no peripheral edema [Bowel Sounds] : normal bowel sounds [Abdomen Soft] : soft [Abdomen Tenderness] : non-tender [Cervical Lymph Nodes Enlarged Anterior Bilaterally] : anterior cervical [Cervical Lymph Nodes Enlarged Posterior Bilaterally] : posterior cervical [Abdomen Mass (___ Cm)] : no abdominal mass palpated [Supraclavicular Lymph Nodes Enlarged Bilaterally] : supraclavicular [Femoral Lymph Nodes Enlarged Bilaterally] : femoral [Inguinal Lymph Nodes Enlarged Bilaterally] : inguinal [Axillary Lymph Nodes Enlarged Bilaterally] : axillary [Abnormal Walk] : normal gait [Nail Clubbing] : no clubbing  or cyanosis of the fingernails [Musculoskeletal - Swelling] : no joint swelling seen [Skin Color & Pigmentation] : normal skin color and pigmentation [Motor Tone] : muscle strength and tone were normal [Skin Turgor] : normal skin turgor [] : no rash [No Focal Deficits] : no focal deficits [Impaired Insight] : insight and judgment were intact [Oriented To Time, Place, And Person] : oriented to person, place, and time [Affect] : the affect was normal

## 2019-12-08 ENCOUNTER — FORM ENCOUNTER (OUTPATIENT)
Age: 79
End: 2019-12-08

## 2019-12-09 ENCOUNTER — APPOINTMENT (OUTPATIENT)
Dept: PULMONOLOGY | Facility: CLINIC | Age: 79
End: 2019-12-09
Payer: MEDICARE

## 2019-12-09 ENCOUNTER — OUTPATIENT (OUTPATIENT)
Dept: OUTPATIENT SERVICES | Facility: HOSPITAL | Age: 79
LOS: 1 days | End: 2019-12-09
Payer: MEDICARE

## 2019-12-09 VITALS
HEART RATE: 71 BPM | SYSTOLIC BLOOD PRESSURE: 152 MMHG | WEIGHT: 150 LBS | DIASTOLIC BLOOD PRESSURE: 78 MMHG | BODY MASS INDEX: 28.32 KG/M2 | OXYGEN SATURATION: 95 % | HEIGHT: 61 IN

## 2019-12-09 DIAGNOSIS — Z98.890 OTHER SPECIFIED POSTPROCEDURAL STATES: Chronic | ICD-10-CM

## 2019-12-09 DIAGNOSIS — R05 COUGH: ICD-10-CM

## 2019-12-09 DIAGNOSIS — Z98.89 OTHER SPECIFIED POSTPROCEDURAL STATES: Chronic | ICD-10-CM

## 2019-12-09 PROCEDURE — 99205 OFFICE O/P NEW HI 60 MIN: CPT | Mod: 25

## 2019-12-09 PROCEDURE — 74220 X-RAY XM ESOPHAGUS 1CNTRST: CPT

## 2019-12-09 PROCEDURE — 94060 EVALUATION OF WHEEZING: CPT

## 2019-12-09 PROCEDURE — 74220 X-RAY XM ESOPHAGUS 1CNTRST: CPT | Mod: 26

## 2019-12-09 RX ORDER — LEVOFLOXACIN 500 MG/1
500 TABLET, FILM COATED ORAL DAILY
Qty: 10 | Refills: 0 | Status: DISCONTINUED | COMMUNITY
Start: 2019-11-25 | End: 2019-12-09

## 2019-12-09 NOTE — HISTORY OF PRESENT ILLNESS
[FreeTextEntry1] : 79F diagnosed with bronchitis about 5 weeks s/p levaquin and medrol pack.  Started as a "cold" with cough/runny nose.  Progressed to worsening cough and dyspnea.  Was hospitalized for 2 days.  Today reports she is still coughing all night long and feels like she cannot catch her breath.  Feels overall very weak, unlike herself.  Lately has been choking on food.  Prior to getting bronchitis reports no issues with breathing or coughing, was very active without issue.  Denies nasal congestion and post nasal drip.  Feels her mouth is very dry.  Recently lost about 7lbs.  Had swallow evaluation this AM but reports not ready yet.  May be undergoing endoscopy and requires pulmonary clearance.

## 2019-12-09 NOTE — ASSESSMENT
[FreeTextEntry1] : likely post viral cough syndrome\par trial of flonase for help with post nasal drip making cough worse at night\par no pulmonary contraindication to proceed with endoscopy if needed.\par reassess in 1 month

## 2019-12-09 NOTE — PHYSICAL EXAM
[Well Groomed] : well groomed [Neck Appearance] : the appearance of the neck was normal [General Appearance - In No Acute Distress] : no acute distress [Heart Sounds] : normal S1 and S2 [] : no respiratory distress [Respiration, Rhythm And Depth] : normal respiratory rhythm and effort [Nail Clubbing] : no clubbing of the fingernails [Auscultation Breath Sounds / Voice Sounds] : lungs were clear to auscultation bilaterally [Exaggerated Use Of Accessory Muscles For Inspiration] : no accessory muscle use [Cyanosis, Localized] : no localized cyanosis

## 2019-12-09 NOTE — PROCEDURE
[FreeTextEntry1] : Spirometry: Normal without a significant bronchodilator response.\par **patient had difficulty performing lung volume and DLCO maneuvers**\par \par EXAM: CT CHEST \par \par PROCEDURE DATE: 11/18/2019 \par \par INTERPRETATION: CLINICAL INFORMATION: Productive cough \par \par COMPARISON: 4/1/2016 \par \par PROCEDURE: \par CT of the Chest was performed without intravenous contrast. \par Sagittal and coronal reformats were performed. \par \par FINDINGS: \par \par LUNGS AND AIRWAYS: Patent central airways. Lungs are clear. \par \par PLEURA: No pleural effusion. \par \par MEDIASTINUM AND [place]: No lymphadenopathy. Lack of IV contrast limits hilar \par evaluation. Moderate hiatal hernia. \par \par VESSELS: Nonaneurysmal. \par \par HEART: Heart size is normal with coronary artery calcification. No \par pericardial effusion. \par \par CHEST WALL AND LOWER NECK: Within normal limits. \par \par VISUALIZED UPPER ABDOMEN: Calcified gallstones. Bilateral nonobstructive \par renal calculi \par \par BONES: Senescent changes thoracic spine. Stable chronic lower thoracic \par compression deformity. \par \par IMPRESSION: \par \par No specific acute pathology on this noncontrast study

## 2019-12-09 NOTE — CONSULT LETTER
[DrArline  ___] : Dr. SAENZ [FreeTextEntry1] : Dear ,\par \par I had the pleasure of evaluating your patient, ADELFO KERR today in pulmonary consultation.  Please refer to my attached note for my findings and recommendations.\par \par \par Thank you for allowing me to participate in the care of your patient, please feel free to call with any questions or concerns.\par \par \par Sincerely,\par \par Dorita Ortiz MD\par [facility] \par [facility]\par \par

## 2019-12-10 ENCOUNTER — OTHER (OUTPATIENT)
Age: 79
End: 2019-12-10

## 2019-12-10 ENCOUNTER — RESULT REVIEW (OUTPATIENT)
Age: 79
End: 2019-12-10

## 2019-12-11 ENCOUNTER — OTHER (OUTPATIENT)
Age: 79
End: 2019-12-11

## 2019-12-13 ENCOUNTER — APPOINTMENT (OUTPATIENT)
Dept: GASTROENTEROLOGY | Facility: AMBULATORY MEDICAL SERVICES | Age: 79
End: 2019-12-13

## 2019-12-13 ENCOUNTER — OTHER (OUTPATIENT)
Age: 79
End: 2019-12-13

## 2019-12-13 ENCOUNTER — APPOINTMENT (OUTPATIENT)
Dept: GASTROENTEROLOGY | Facility: AMBULATORY MEDICAL SERVICES | Age: 79
End: 2019-12-13
Payer: MEDICARE

## 2019-12-13 PROCEDURE — 43239 EGD BIOPSY SINGLE/MULTIPLE: CPT

## 2019-12-23 ENCOUNTER — OTHER (OUTPATIENT)
Age: 79
End: 2019-12-23

## 2020-01-10 ENCOUNTER — APPOINTMENT (OUTPATIENT)
Dept: PULMONOLOGY | Facility: CLINIC | Age: 80
End: 2020-01-10
Payer: MEDICARE

## 2020-01-10 VITALS
WEIGHT: 150 LBS | SYSTOLIC BLOOD PRESSURE: 163 MMHG | BODY MASS INDEX: 28.32 KG/M2 | HEART RATE: 61 BPM | HEIGHT: 61 IN | DIASTOLIC BLOOD PRESSURE: 83 MMHG | OXYGEN SATURATION: 96 %

## 2020-01-10 PROCEDURE — 99213 OFFICE O/P EST LOW 20 MIN: CPT

## 2020-01-10 NOTE — PHYSICAL EXAM
[Well Groomed] : well groomed [General Appearance - In No Acute Distress] : no acute distress [Neck Appearance] : the appearance of the neck was normal [Heart Sounds] : normal S1 and S2 [] : no respiratory distress [Respiration, Rhythm And Depth] : normal respiratory rhythm and effort [Auscultation Breath Sounds / Voice Sounds] : lungs were clear to auscultation bilaterally [Exaggerated Use Of Accessory Muscles For Inspiration] : no accessory muscle use [Nail Clubbing] : no clubbing of the fingernails [Cyanosis, Localized] : no localized cyanosis

## 2020-01-10 NOTE — PROCEDURE
[FreeTextEntry1] : prior exams reviewed:\par \par Spirometry: Normal without a significant bronchodilator response.\par **patient had difficulty performing lung volume and DLCO maneuvers**\par \par EXAM: CT CHEST \par \par PROCEDURE DATE: 11/18/2019 \par \par INTERPRETATION: CLINICAL INFORMATION: Productive cough \par \par COMPARISON: 4/1/2016 \par \par PROCEDURE: \par CT of the Chest was performed without intravenous contrast. \par Sagittal and coronal reformats were performed. \par \par FINDINGS: \par \par LUNGS AND AIRWAYS: Patent central airways. Lungs are clear. \par \par PLEURA: No pleural effusion. \par \par MEDIASTINUM AND [place]: No lymphadenopathy. Lack of IV contrast limits hilar \par evaluation. Moderate hiatal hernia. \par \par VESSELS: Nonaneurysmal. \par \par HEART: Heart size is normal with coronary artery calcification. No \par pericardial effusion. \par \par CHEST WALL AND LOWER NECK: Within normal limits. \par \par VISUALIZED UPPER ABDOMEN: Calcified gallstones. Bilateral nonobstructive \par renal calculi \par \par BONES: Senescent changes thoracic spine. Stable chronic lower thoracic \par compression deformity. \par \par IMPRESSION: \par \par No specific acute pathology on this noncontrast study

## 2020-01-10 NOTE — HISTORY OF PRESENT ILLNESS
[FreeTextEntry1] : cough has resolved\par no dyspnea\par no chest pain\par \par found something abnormal in her throat/vocal cords? being worked up by ENT

## 2020-01-13 ENCOUNTER — APPOINTMENT (OUTPATIENT)
Dept: CARDIOLOGY | Facility: CLINIC | Age: 80
End: 2020-01-13
Payer: MEDICARE

## 2020-01-13 VITALS — SYSTOLIC BLOOD PRESSURE: 130 MMHG | DIASTOLIC BLOOD PRESSURE: 68 MMHG

## 2020-01-13 VITALS
SYSTOLIC BLOOD PRESSURE: 147 MMHG | OXYGEN SATURATION: 97 % | BODY MASS INDEX: 28.13 KG/M2 | HEART RATE: 61 BPM | DIASTOLIC BLOOD PRESSURE: 75 MMHG | WEIGHT: 149 LBS | HEIGHT: 61 IN

## 2020-01-13 DIAGNOSIS — R94.31 ABNORMAL ELECTROCARDIOGRAM [ECG] [EKG]: ICD-10-CM

## 2020-01-13 PROCEDURE — 99214 OFFICE O/P EST MOD 30 MIN: CPT

## 2020-01-13 NOTE — DISCUSSION/SUMMARY
[FreeTextEntry1] : 79-year-old woman with a history as listed above who presents today for a followup cardiac evaluation.\par \par Yara is doing well overall. She denies any anginal symptoms. Clinically she is euvolemic on exam. She had a nuclear stress test on 7/2016 that did not show any ischemia.  She had an echo in 9/2019  that showed normal systolic LV function with mild AI. I  don’t think she requires another ischemic evaluation at this time especially in the setting of the cough. \par I don’t think her cough is cardiac in nature. I will followup her evaluation with pulmonary, GERD,  and ENT. \par \par Her heart rate is much better off of the Coreg. \par her blood pressure is controlled. l. She will continue Losartan 50mg q12, Norvasc  10 mg daily, and  hydralazine 25mg q12. She does not want to try any type of diuretic.  She understands and want to continue to try lifestyle modification.  \par \par She is coronary calcifications. Her goal LDL should be less than 100. She will continue her current dose of simvastatin 40mg HS.  Her last lipid profile shows that she is at goal. \par \par \par  Exercise and diet counseling was performed in order to reduce her future cardiovascular risk. \par \par She will followup with me in 3 months time or sooner if necessary.

## 2020-01-13 NOTE — PHYSICAL EXAM
[Well Groomed] : well groomed [General Appearance - In No Acute Distress] : no acute distress [Normal Conjunctiva] : the conjunctiva exhibited no abnormalities [Eyelids - No Xanthelasma] : the eyelids demonstrated no xanthelasmas [Normal Oral Mucosa] : normal oral mucosa [No Oral Pallor] : no oral pallor [No Oral Cyanosis] : no oral cyanosis [Normal Jugular Venous V Waves Present] : normal jugular venous V waves present [Normal Jugular Venous A Waves Present] : normal jugular venous A waves present [No Jugular Venous Costello A Waves] : no jugular venous costello A waves [Auscultation Breath Sounds / Voice Sounds] : lungs were clear to auscultation bilaterally [Respiration, Rhythm And Depth] : normal respiratory rhythm and effort [Exaggerated Use Of Accessory Muscles For Inspiration] : no accessory muscle use [Abdomen Soft] : soft [Abdomen Tenderness] : non-tender [Abdomen Mass (___ Cm)] : no abdominal mass palpated [Abnormal Walk] : normal gait [Gait - Sufficient For Exercise Testing] : the gait was sufficient for exercise testing [Cyanosis, Localized] : no localized cyanosis [Nail Clubbing] : no clubbing of the fingernails [Petechial Hemorrhages (___cm)] : no petechial hemorrhages [Skin Color & Pigmentation] : normal skin color and pigmentation [] : no rash [No Skin Ulcers] : no skin ulcer [No Venous Stasis] : no venous stasis [Skin Lesions] : no skin lesions [No Xanthoma] : no  xanthoma was observed [Affect] : the affect was normal [Oriented To Time, Place, And Person] : oriented to person, place, and time [No Anxiety] : not feeling anxious [Mood] : the mood was normal [Normal S1] : normal S1 [Rhythm Regular] : regular [Normal Rate] : normal [Normal S2] : normal S2 [No Gallop] : no gallop heard [II] : a grade 2 [I] : a grade 1 [1+] : Carotid: right 1+ [Right Carotid Bruit] : no bruit heard over the right carotid [Left Carotid Bruit] : no bruit heard over the left carotid [2+] : left 2+ [Bruit] : no bruit heard [No Pitting Edema] : no pitting edema present

## 2020-01-13 NOTE — HISTORY OF PRESENT ILLNESS
[FreeTextEntry1] : 78-year-old woman with a history of anxiety, hypertension, hyperlipidemia who had presented to [facility] with a pneumonia recently. During her hospitalization she was found to have lateral T wave inversions on EKG which were worse than previously seen. She had echocardiogram that showed normal left ventricular function. Her cardiac enzymes were negative. she had a CAT scan of the chest which was also revealing for coronary artery calcifications and calcifications to the aorta and its main branches. she had nuclear stress test which was unremarkable.\par She had a syncopal episode when she was on Chlorthalidone. She was taken to  in early 2019 and her diuretic was stopped.  \par \par She had an echo in 9/2019 that showed normal systolic LV function without any significant other findings, including no significant valvular disease. \par \par \par She now presents for a followup visit.\par Since her last visit, she has been having a significant cough. It is productive with white phlegm or with regurgitant food. She underwent a pulmonary and GI evaluation. It appears that she may have GERD causing a vocal cord issue. She is planning to see ENT on Wed to get the results of a recent laryngoscopy. \par She has relatively sedentary for the past few weeks. \par Her palpitations have resolved.    She denies any anginal symptoms. She denies any dyspnea, chest pain, PND, orthopnea,  edema,  syncope, stroke like symptoms. She denies any blurry vision, headaches or recent stroke like symptoms. she's been compliant with her medications.

## 2020-02-11 ENCOUNTER — APPOINTMENT (OUTPATIENT)
Dept: INTERNAL MEDICINE | Facility: CLINIC | Age: 80
End: 2020-02-11
Payer: MEDICARE

## 2020-02-11 VITALS
WEIGHT: 149 LBS | TEMPERATURE: 97.7 F | HEART RATE: 62 BPM | OXYGEN SATURATION: 97 % | RESPIRATION RATE: 14 BRPM | DIASTOLIC BLOOD PRESSURE: 72 MMHG | SYSTOLIC BLOOD PRESSURE: 130 MMHG | BODY MASS INDEX: 28.15 KG/M2

## 2020-02-11 DIAGNOSIS — K22.2 ESOPHAGEAL OBSTRUCTION: ICD-10-CM

## 2020-02-11 PROCEDURE — 36415 COLL VENOUS BLD VENIPUNCTURE: CPT

## 2020-02-11 PROCEDURE — 99214 OFFICE O/P EST MOD 30 MIN: CPT | Mod: 25

## 2020-02-11 NOTE — PLAN
[FreeTextEntry1] : Constitutional\par fatigue - check CBC, CMP, and TSH - likely secondary to deconditioning \par Pulmonary\par SOB - continue Albuterol Sulfate  MCG/ACT p.r.n. as directed - follow up with pulmonologist, Dr. Ortiz as needed\par Cardiology\par hypertension - continue Losartan Potassium 50mg BID p.o.q.d., Hydralazine HCl 25mg BID p.o.q.d., and Amlodipine Besylate 10mg p.o.q.d. - continue low sodium diet\par continue Aspirin 325mg p.o.q.d. given history of ASCAD - continue to follow up with cardiologist, Dr. Johnson\par Endocrinology\par hyperlipidemia - continue Simvastatin 40mg p.o.q.h.s. - continue low cholesterol/low fat diet - check FLP and LFTs\par pre-diabetes (hyperglycemia) - continue Metformin HCl ER 500mg BID p.o.q.d. - continue low carbohydrate/low sugar diet - check hemoglobin A1C\par vitamin D deficiency - continue vitamin D 1000 unit tablets p.o.q.d with meals \par Gastroenterology\par GERD - continue Omeprazole 20mg p.o.q.d. - continue antireflux measures\par Follow up with gastroenterologist, Dr. Sands\par Hematology\par leukocytosis - check CBC\par Psychiatry\par history of anxiety - continue Sertraline HCl 25mg p.o.q.d. - continue to follow up with psychiatrist, Dr. Thibodeaux\par \par check CBC, CMP, hemoglobin A1C, FLP, and TSH

## 2020-02-11 NOTE — HISTORY OF PRESENT ILLNESS
[FreeTextEntry1] : fasting blood work and general follow-up\par  [de-identified] : Patient is a 79 year old female with a past medical history as below who presents for fasting blood work and general follow-up. Patient states she is taking all medications as prescribed and denies any adverse reactions or side effects. She denies lightheadedness or dizziness on Losartan Potassium and Amlodipine Besylate. She denies any new arthralgias or myalgias on Simvastatin. 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 Scoping with ENT specialist, Dr. Jo revealed a narrow vocal cord. CT chest per pulmonologist, Dr. Ortiz was normal. Patient states she will be following up with Dr. Sands tomorrow. She also notes an upcoming appointment with psychiatrist, Dr. Thibodeaux. Patient states she tries to maintain a well-balanced diet. Patient states the cough has persisted, but notes significant improvement since her last visit. She notes fatigue. Patient notes adverse reactions to the flu vaccine, Pneumonia vaccine, and Shingles vaccine in the past.

## 2020-02-11 NOTE — PHYSICAL EXAM
[No Acute Distress] : no acute distress [Well Nourished] : well nourished [Well-Appearing] : well-appearing [Well Developed] : well developed [Normal Sclera/Conjunctiva] : normal sclera/conjunctiva [EOMI] : extraocular movements intact [PERRL] : pupils equal round and reactive to light [Normal Outer Ear/Nose] : the outer ears and nose were normal in appearance [Normal Oropharynx] : the oropharynx was normal [No JVD] : no jugular venous distention [No Lymphadenopathy] : no lymphadenopathy [Supple] : supple [Thyroid Normal, No Nodules] : the thyroid was normal and there were no nodules present [No Accessory Muscle Use] : no accessory muscle use [No Respiratory Distress] : no respiratory distress  [Regular Rhythm] : with a regular rhythm [Clear to Auscultation] : lungs were clear to auscultation bilaterally [Normal Rate] : normal rate  [No Carotid Bruits] : no carotid bruits [No Murmur] : no murmur heard [Normal S1, S2] : normal S1 and S2 [Pedal Pulses Present] : the pedal pulses are present [No Abdominal Bruit] : a ~M bruit was not heard ~T in the abdomen [No Varicosities] : no varicosities [No Edema] : there was no peripheral edema [No Palpable Aorta] : no palpable aorta [Soft] : abdomen soft [No Extremity Clubbing/Cyanosis] : no extremity clubbing/cyanosis [Non Tender] : non-tender [Non-distended] : non-distended [No Masses] : no abdominal mass palpated [No HSM] : no HSM [Normal Bowel Sounds] : normal bowel sounds [Normal Posterior Cervical Nodes] : no posterior cervical lymphadenopathy [Normal Anterior Cervical Nodes] : no anterior cervical lymphadenopathy [No CVA Tenderness] : no CVA  tenderness [No Spinal Tenderness] : no spinal tenderness [No Joint Swelling] : no joint swelling [Coordination Grossly Intact] : coordination grossly intact [Grossly Normal Strength/Tone] : grossly normal strength/tone [No Rash] : no rash [Deep Tendon Reflexes (DTR)] : deep tendon reflexes were 2+ and symmetric [No Focal Deficits] : no focal deficits [Normal Gait] : normal gait [Normal Affect] : the affect was normal [Normal Insight/Judgement] : insight and judgment were intact [de-identified] : overweight

## 2020-02-11 NOTE — ADDENDUM
[FreeTextEntry1] : I, Kevin Castaneda, acted solely as scribe for Dr. Edu Boston DO on this date 02/11/2020  9:00AM .\par \par All medical record entries made by the Scribe were at my, Dr. Edu Boston DO direction and personally dictated by me on 02/11/2020  9:00AM. I have reviewed the chart and agree that the record accurately reflects my personal performance of the history, physical exam, assessment and plan. I have also personally directed, reviewed and agreed with the chart.\par

## 2020-02-11 NOTE — HEALTH RISK ASSESSMENT
[No] : No [1] : 2) Feeling down, depressed, or hopeless for several days (1) [] : No [[id number]] : 2 [Audit-CScore] : 0

## 2020-02-12 ENCOUNTER — APPOINTMENT (OUTPATIENT)
Dept: GASTROENTEROLOGY | Facility: CLINIC | Age: 80
End: 2020-02-12
Payer: MEDICARE

## 2020-02-12 VITALS
DIASTOLIC BLOOD PRESSURE: 75 MMHG | BODY MASS INDEX: 28.13 KG/M2 | WEIGHT: 149 LBS | HEART RATE: 59 BPM | OXYGEN SATURATION: 96 % | SYSTOLIC BLOOD PRESSURE: 173 MMHG | HEIGHT: 61 IN

## 2020-02-12 DIAGNOSIS — R05 COUGH: ICD-10-CM

## 2020-02-12 PROCEDURE — 99214 OFFICE O/P EST MOD 30 MIN: CPT

## 2020-02-12 NOTE — ASSESSMENT
[FreeTextEntry1] : Impression\par \par Mild chronic intermittent dysphagia to both solids and liquids\par \par Upper endoscopy without any esophageal stricture, in fact lower esophageal sphincter appears incompetent\par \par ENT examination suggest possible vocal cord paralysis on the right side\par \par Chronic cough\par \par GERD, well controlled on omeprazole 20 mg p.o. b.i.d.\par \par Swallowing therapy scheduled with speech pathologist\par \par Suggest\par \par Antireflux diet\par \par Continue omeprazole 20 mg p.o. b.i.d.\par \par Agree with speech pathologist to help with swallowing\par \par Follow up with ENT\par \par Follow up with me

## 2020-02-12 NOTE — PHYSICAL EXAM
[General Appearance - In No Acute Distress] : in no acute distress [General Appearance - Alert] : alert [General Appearance - Well Nourished] : well nourished [General Appearance - Well Developed] : well developed [General Appearance - Well-Appearing] : healthy appearing [Sclera] : the sclera and conjunctiva were normal [Neck Appearance] : the appearance of the neck was normal [Jugular Venous Distention Increased] : there was no jugular-venous distention [Neck Cervical Mass (___cm)] : no neck mass was observed [Respiration, Rhythm And Depth] : normal respiratory rhythm and effort [Exaggerated Use Of Accessory Muscles For Inspiration] : no accessory muscle use [Apical Impulse] : the apical impulse was normal [Heart Rate And Rhythm] : heart rate was normal and rhythm regular [Full Pulse] : the pedal pulses are present [Edema] : there was no peripheral edema [Bowel Sounds] : normal bowel sounds [Abdomen Soft] : soft [Abdomen Tenderness] : non-tender [Abdomen Mass (___ Cm)] : no abdominal mass palpated [Supraclavicular Lymph Nodes Enlarged Bilaterally] : supraclavicular [Cervical Lymph Nodes Enlarged Anterior Bilaterally] : anterior cervical [Cervical Lymph Nodes Enlarged Posterior Bilaterally] : posterior cervical [Femoral Lymph Nodes Enlarged Bilaterally] : femoral [Axillary Lymph Nodes Enlarged Bilaterally] : axillary [Inguinal Lymph Nodes Enlarged Bilaterally] : inguinal [Abnormal Walk] : normal gait [Nail Clubbing] : no clubbing  or cyanosis of the fingernails [Motor Tone] : muscle strength and tone were normal [Musculoskeletal - Swelling] : no joint swelling seen [] : no rash [Skin Turgor] : normal skin turgor [Skin Color & Pigmentation] : normal skin color and pigmentation [No Focal Deficits] : no focal deficits [Oriented To Time, Place, And Person] : oriented to person, place, and time [Impaired Insight] : insight and judgment were intact [Affect] : the affect was normal

## 2020-02-12 NOTE — HISTORY OF PRESENT ILLNESS
[de-identified] : Dr. Boston Takes care of this very pleasant 79-year-old female\par \par She's been having fairly chronic intermittent dysphagia to both liquids and solids\par \par Barium esophagram suggested possible distal esophageal narrowing\par \par Upper endoscopy, no narrowing found, in fact the lower esophageal sphincter seemed to be incompetent\par \par She's been seen by ear nose and throat, and found to have a vocal cord that is slightly abnormal and the sense that it's not moving much\par \par She scheduled to have a speech therapist to help me with swallowing\par \par She is on omeprazole, 20 mg a day b.i.d. and this seems to help also with the swallowing, and also has helped with her heartburn tremendously\par \par Her weight is stable\par \par She has a dry cough\par \par

## 2020-02-15 LAB
ALBUMIN SERPL ELPH-MCNC: 4.6 G/DL
ALP BLD-CCNC: 107 U/L
ALT SERPL-CCNC: 13 U/L
ANION GAP SERPL CALC-SCNC: 12 MMOL/L
AST SERPL-CCNC: 16 U/L
BASOPHILS # BLD AUTO: 0.07 K/UL
BASOPHILS NFR BLD AUTO: 0.8 %
BILIRUB SERPL-MCNC: 0.3 MG/DL
BUN SERPL-MCNC: 18 MG/DL
CALCIUM SERPL-MCNC: 10 MG/DL
CHLORIDE SERPL-SCNC: 105 MMOL/L
CHOLEST SERPL-MCNC: 192 MG/DL
CHOLEST/HDLC SERPL: 4 RATIO
CO2 SERPL-SCNC: 26 MMOL/L
CREAT SERPL-MCNC: 0.91 MG/DL
EOSINOPHIL # BLD AUTO: 0.14 K/UL
EOSINOPHIL NFR BLD AUTO: 1.6 %
ESTIMATED AVERAGE GLUCOSE: 126 MG/DL
GLUCOSE SERPL-MCNC: 128 MG/DL
HBA1C MFR BLD HPLC: 6 %
HCT VFR BLD CALC: 43.3 %
HDLC SERPL-MCNC: 48 MG/DL
HGB BLD-MCNC: 13.4 G/DL
IMM GRANULOCYTES NFR BLD AUTO: 0.3 %
LDLC SERPL CALC-MCNC: 114 MG/DL
LYMPHOCYTES # BLD AUTO: 1.67 K/UL
LYMPHOCYTES NFR BLD AUTO: 18.6 %
MAN DIFF?: NORMAL
MCHC RBC-ENTMCNC: 29.6 PG
MCHC RBC-ENTMCNC: 30.9 GM/DL
MCV RBC AUTO: 95.8 FL
MONOCYTES # BLD AUTO: 0.71 K/UL
MONOCYTES NFR BLD AUTO: 7.9 %
NEUTROPHILS # BLD AUTO: 6.34 K/UL
NEUTROPHILS NFR BLD AUTO: 70.8 %
PLATELET # BLD AUTO: 453 K/UL
POTASSIUM SERPL-SCNC: 4 MMOL/L
PROT SERPL-MCNC: 7.1 G/DL
RBC # BLD: 4.52 M/UL
RBC # FLD: 14.3 %
SODIUM SERPL-SCNC: 143 MMOL/L
TRIGL SERPL-MCNC: 147 MG/DL
TSH SERPL-ACNC: 1.75 UIU/ML
WBC # FLD AUTO: 8.96 K/UL

## 2020-03-10 ENCOUNTER — APPOINTMENT (OUTPATIENT)
Dept: CARDIOLOGY | Facility: CLINIC | Age: 80
End: 2020-03-10

## 2020-04-20 ENCOUNTER — APPOINTMENT (OUTPATIENT)
Dept: CARDIOLOGY | Facility: CLINIC | Age: 80
End: 2020-04-20

## 2020-04-29 ENCOUNTER — APPOINTMENT (OUTPATIENT)
Dept: DISASTER EMERGENCY | Facility: CLINIC | Age: 80
End: 2020-04-29
Payer: MEDICARE

## 2020-04-29 VITALS
DIASTOLIC BLOOD PRESSURE: 74 MMHG | SYSTOLIC BLOOD PRESSURE: 134 MMHG | TEMPERATURE: 98.5 F | OXYGEN SATURATION: 97 % | RESPIRATION RATE: 20 BRPM | HEART RATE: 72 BPM

## 2020-04-29 PROCEDURE — 99213 OFFICE O/P EST LOW 20 MIN: CPT | Mod: CS

## 2020-04-30 LAB — SARS-COV-2 N GENE NPH QL NAA+PROBE: NOT DETECTED

## 2020-04-30 NOTE — HISTORY OF PRESENT ILLNESS
[Patient presents to the office today for COVID-19 evaluation and testing.] : Patient presents to the office today for COVID-19 evaluation and testing. [Patient has been pre-screened by RN at call center for appointment today with our facility.] : Patient has been pre-screened by RN at call center for appointment today with our facility. [] : moderate dizziness on standing [With Suspected Case] : patient exposed to a suspected case of COVID-19 [None] : none [Age >= 60 years] : age >= 60 years [Clear] : clear [Good Air Entry] : good air entry [Speaks in full sentences] : speaks in full sentences [Normal O2 sat at rest] : normal O2 sat at rest [Discharged with current Quarantine instructions and advised of signs of worsening illness.] : Patient discharged with current quarantine instructions and advised of signs of worsening illness. Patient told to seek emergent care if symptoms occur. [COVID-19 testing ordered and specimen obtained] : COVID-19 testing ordered and specimen obtained [Grossly normal, interacts, not tired or weak] : grossly normal, interacts, not tired or weak [FreeTextEntry1] : Symptoms for one week. Increasing SOB and fatigue. Lives with son [TextBox_66] : HTN, prediabetes [TextBox_97] : In creased resp with ambulation but maintains sats at 98

## 2020-05-14 ENCOUNTER — APPOINTMENT (OUTPATIENT)
Dept: INTERNAL MEDICINE | Facility: CLINIC | Age: 80
End: 2020-05-14
Payer: MEDICARE

## 2020-05-14 VITALS
HEART RATE: 60 BPM | TEMPERATURE: 99.3 F | SYSTOLIC BLOOD PRESSURE: 148 MMHG | HEIGHT: 61 IN | DIASTOLIC BLOOD PRESSURE: 76 MMHG | WEIGHT: 153 LBS | RESPIRATION RATE: 16 BRPM | BODY MASS INDEX: 28.89 KG/M2 | OXYGEN SATURATION: 97 %

## 2020-05-14 DIAGNOSIS — Z20.828 CONTACT WITH AND (SUSPECTED) EXPOSURE TO OTHER VIRAL COMMUNICABLE DISEASES: ICD-10-CM

## 2020-05-14 DIAGNOSIS — Z86.008 PERSONAL HISTORY OF IN-SITU NEOPLASM OF OTHER SITE: ICD-10-CM

## 2020-05-14 PROCEDURE — 99214 OFFICE O/P EST MOD 30 MIN: CPT | Mod: CS,25

## 2020-05-14 PROCEDURE — 36415 COLL VENOUS BLD VENIPUNCTURE: CPT | Mod: CS

## 2020-05-14 NOTE — HEALTH RISK ASSESSMENT
[] : No [No] : In the past 12 months have you used drugs other than those required for medical reasons? No [1] : 2) Feeling down, depressed, or hopeless for several days (1) [Audit-CScore] : 0 [[id number]] : 2

## 2020-05-14 NOTE — PLAN
[FreeTextEntry1] : Constitutional\par fatigue - check CBC, CMP, and TSH - likely secondary to deconditioning and presumed recent covid 19 infection\par Pulmonary\par SOB - continue Albuterol Sulfate  MCG/ACT p.r.n. as directed - follow up with pulmonologist, Dr. Ortiz as needed\par Cardiology\par hypertension - continue Losartan Potassium 50mg BID p.o.q.d., Hydralazine HCl 25mg BID p.o.q.d., and Amlodipine Besylate 10mg p.o.q.d. - continue low sodium diet\par continue Aspirin 325mg p.o.q.d. given history of ASCAD - continue to follow up with cardiologist, Dr. Johnson\par Endocrinology\par hyperlipidemia - continue Simvastatin 40mg p.o.q.h.s. - continue low cholesterol/low fat diet - check FLP and LFTs\par pre-diabetes (hyperglycemia) - continue Metformin HCl ER 500mg BID p.o.q.d. - continue low carbohydrate/low sugar diet - check hemoglobin A1C\par vitamin D deficiency - continue vitamin D 1000 unit tablets p.o.q.d with meals \par Gastroenterology\par GERD - continue Omeprazole 20mg p.o.q.d. - continue antireflux measures\par Follow up with gastroenterologist, Dr. Sands\par Psychiatry\par history of anxiety - continue Sertraline HCl 25mg p.o.q.d. - continue to follow up with psychiatrist, Dr. Thibodeaux\par \par check CBC, CMP, hemoglobin A1C, FLP, and TSH

## 2020-05-14 NOTE — HISTORY OF PRESENT ILLNESS
[FreeTextEntry1] : fasting blood work and general follow-up\par  [de-identified] : Patient is a 79 year old female with a past medical history as below who presents for fasting blood work and general follow-up. Patient states she is taking all medications as prescribed and denies any adverse reactions or side effects. She denies lightheadedness or dizziness on Losartan Potassium, hydralazine and Amlodipine Besylate. She denies any new arthralgias or myalgias on Simvastatin. She has had a chronic dry cough for 6-8 weeks whichh is significantly improving.  She was tested for Covid 19 whichh was negative.  She also notes an upcoming appointment with psychiatrist, Dr. Thibodeaux.  She has gained 4 pounds sice staying home as a result of the covid pandemic.

## 2020-05-14 NOTE — PHYSICAL EXAM
[Well Nourished] : well nourished [No Acute Distress] : no acute distress [Well Developed] : well developed [Well-Appearing] : well-appearing [Normal Sclera/Conjunctiva] : normal sclera/conjunctiva [PERRL] : pupils equal round and reactive to light [EOMI] : extraocular movements intact [Normal Outer Ear/Nose] : the outer ears and nose were normal in appearance [Normal Oropharynx] : the oropharynx was normal [No JVD] : no jugular venous distention [No Lymphadenopathy] : no lymphadenopathy [Supple] : supple [Thyroid Normal, No Nodules] : the thyroid was normal and there were no nodules present [No Respiratory Distress] : no respiratory distress  [No Accessory Muscle Use] : no accessory muscle use [Clear to Auscultation] : lungs were clear to auscultation bilaterally [Normal Rate] : normal rate  [Regular Rhythm] : with a regular rhythm [Normal S1, S2] : normal S1 and S2 [No Murmur] : no murmur heard [No Carotid Bruits] : no carotid bruits [No Abdominal Bruit] : a ~M bruit was not heard ~T in the abdomen [No Varicosities] : no varicosities [Pedal Pulses Present] : the pedal pulses are present [No Edema] : there was no peripheral edema [No Palpable Aorta] : no palpable aorta [No Extremity Clubbing/Cyanosis] : no extremity clubbing/cyanosis [Soft] : abdomen soft [Non Tender] : non-tender [Non-distended] : non-distended [No Masses] : no abdominal mass palpated [No HSM] : no HSM [Normal Bowel Sounds] : normal bowel sounds [Normal Posterior Cervical Nodes] : no posterior cervical lymphadenopathy [Normal Anterior Cervical Nodes] : no anterior cervical lymphadenopathy [No CVA Tenderness] : no CVA  tenderness [No Spinal Tenderness] : no spinal tenderness [No Joint Swelling] : no joint swelling [Grossly Normal Strength/Tone] : grossly normal strength/tone [No Rash] : no rash [Coordination Grossly Intact] : coordination grossly intact [No Focal Deficits] : no focal deficits [Normal Gait] : normal gait [Deep Tendon Reflexes (DTR)] : deep tendon reflexes were 2+ and symmetric [Normal Affect] : the affect was normal [Normal Insight/Judgement] : insight and judgment were intact [de-identified] : overweight

## 2020-05-19 LAB
25(OH)D3 SERPL-MCNC: 46.6 NG/ML
ALBUMIN SERPL ELPH-MCNC: 4.4 G/DL
ALP BLD-CCNC: 104 U/L
ALT SERPL-CCNC: 17 U/L
ANION GAP SERPL CALC-SCNC: 14 MMOL/L
AST SERPL-CCNC: 20 U/L
BASOPHILS # BLD AUTO: 0.09 K/UL
BASOPHILS NFR BLD AUTO: 1.1 %
BILIRUB SERPL-MCNC: 0.3 MG/DL
BUN SERPL-MCNC: 18 MG/DL
CALCIUM SERPL-MCNC: 9.8 MG/DL
CHLORIDE SERPL-SCNC: 100 MMOL/L
CHOLEST SERPL-MCNC: 194 MG/DL
CHOLEST/HDLC SERPL: 4 RATIO
CO2 SERPL-SCNC: 22 MMOL/L
CREAT SERPL-MCNC: 0.91 MG/DL
EOSINOPHIL # BLD AUTO: 0.22 K/UL
EOSINOPHIL NFR BLD AUTO: 2.6 %
ESTIMATED AVERAGE GLUCOSE: 126 MG/DL
GLUCOSE SERPL-MCNC: 124 MG/DL
HBA1C MFR BLD HPLC: 6 %
HCT VFR BLD CALC: 45 %
HDLC SERPL-MCNC: 49 MG/DL
HGB BLD-MCNC: 13.8 G/DL
IMM GRANULOCYTES NFR BLD AUTO: 0.6 %
LDLC SERPL CALC-MCNC: 115 MG/DL
LYMPHOCYTES # BLD AUTO: 1.65 K/UL
LYMPHOCYTES NFR BLD AUTO: 19.3 %
MAN DIFF?: NORMAL
MCHC RBC-ENTMCNC: 28.9 PG
MCHC RBC-ENTMCNC: 30.7 GM/DL
MCV RBC AUTO: 94.1 FL
MONOCYTES # BLD AUTO: 0.85 K/UL
MONOCYTES NFR BLD AUTO: 10 %
NEUTROPHILS # BLD AUTO: 5.67 K/UL
NEUTROPHILS NFR BLD AUTO: 66.4 %
PLATELET # BLD AUTO: 470 K/UL
POTASSIUM SERPL-SCNC: 4.7 MMOL/L
PROT SERPL-MCNC: 7 G/DL
RBC # BLD: 4.78 M/UL
RBC # FLD: 13.9 %
SARS-COV-2 IGG SERPL IA-ACNC: <0.1 INDEX
SARS-COV-2 IGG SERPL QL IA: NEGATIVE
SODIUM SERPL-SCNC: 137 MMOL/L
TRIGL SERPL-MCNC: 148 MG/DL
TSH SERPL-ACNC: 2.58 UIU/ML
WBC # FLD AUTO: 8.53 K/UL

## 2020-07-13 ENCOUNTER — APPOINTMENT (OUTPATIENT)
Dept: CARDIOLOGY | Facility: CLINIC | Age: 80
End: 2020-07-13
Payer: MEDICARE

## 2020-07-13 ENCOUNTER — NON-APPOINTMENT (OUTPATIENT)
Age: 80
End: 2020-07-13

## 2020-07-13 VITALS
OXYGEN SATURATION: 98 % | BODY MASS INDEX: 30.02 KG/M2 | HEIGHT: 61 IN | SYSTOLIC BLOOD PRESSURE: 161 MMHG | HEART RATE: 68 BPM | WEIGHT: 159 LBS | DIASTOLIC BLOOD PRESSURE: 77 MMHG

## 2020-07-13 VITALS — DIASTOLIC BLOOD PRESSURE: 64 MMHG | SYSTOLIC BLOOD PRESSURE: 150 MMHG

## 2020-07-13 PROCEDURE — 93000 ELECTROCARDIOGRAM COMPLETE: CPT

## 2020-07-13 PROCEDURE — 99215 OFFICE O/P EST HI 40 MIN: CPT

## 2020-07-13 NOTE — HISTORY OF PRESENT ILLNESS
[FreeTextEntry1] : 79-year-old woman with a history of anxiety, hypertension, hyperlipidemia.\par \par She had presented to [facility] with a pneumonia. During her hospitalization she was found to have lateral T wave inversions on EKG which were worse than previously seen. She had echocardiogram that showed normal left ventricular function. Her cardiac enzymes were negative. she had a CAT scan of the chest which was also revealing for coronary artery calcifications and calcifications to the aorta and its main branches. she had nuclear stress test which was unremarkable.\par She had a syncopal episode when she was on Chlorthalidone. She was taken to  in early 2019 and her diuretic was stopped.  \par \par She had an echo in 9/2019 that showed normal systolic LV function without any significant other findings, including no significant valvular disease. \par \par \par She now presents for a followup visit.\par Since her last visit, she has been quarantined at home because of the COVID pandemic. \par She is still feeling dyspneic on exertion after walking about 2 blocks and climbing the stairs. This has been progressing for months. She has relatively sedentary since the pandemic. \par Her palpitations have resolved.    She denies any anginal symptoms. She denies any dyspnea, chest pain, PND, orthopnea,  edema,  syncope, stroke like symptoms. She denies any blurry vision, headaches or recent stroke like symptoms. she's been compliant with her medications.

## 2020-07-13 NOTE — PHYSICAL EXAM
[Well Groomed] : well groomed [General Appearance - In No Acute Distress] : no acute distress [Normal Conjunctiva] : the conjunctiva exhibited no abnormalities [Eyelids - No Xanthelasma] : the eyelids demonstrated no xanthelasmas [Normal Oral Mucosa] : normal oral mucosa [No Oral Pallor] : no oral pallor [No Oral Cyanosis] : no oral cyanosis [Normal Jugular Venous A Waves Present] : normal jugular venous A waves present [Normal Jugular Venous V Waves Present] : normal jugular venous V waves present [No Jugular Venous Costello A Waves] : no jugular venous costello A waves [Respiration, Rhythm And Depth] : normal respiratory rhythm and effort [Exaggerated Use Of Accessory Muscles For Inspiration] : no accessory muscle use [Auscultation Breath Sounds / Voice Sounds] : lungs were clear to auscultation bilaterally [Abdomen Soft] : soft [Abdomen Tenderness] : non-tender [Abdomen Mass (___ Cm)] : no abdominal mass palpated [Abnormal Walk] : normal gait [Gait - Sufficient For Exercise Testing] : the gait was sufficient for exercise testing [Nail Clubbing] : no clubbing of the fingernails [Cyanosis, Localized] : no localized cyanosis [Petechial Hemorrhages (___cm)] : no petechial hemorrhages [Skin Color & Pigmentation] : normal skin color and pigmentation [] : no rash [No Venous Stasis] : no venous stasis [Skin Lesions] : no skin lesions [No Skin Ulcers] : no skin ulcer [No Xanthoma] : no  xanthoma was observed [Oriented To Time, Place, And Person] : oriented to person, place, and time [Affect] : the affect was normal [Mood] : the mood was normal [No Anxiety] : not feeling anxious [Rhythm Regular] : regular [Normal Rate] : normal [Normal S1] : normal S1 [Normal S2] : normal S2 [No Gallop] : no gallop heard [II] : a grade 2 [I] : a grade 1 [1+] : left 1+ [Right Carotid Bruit] : no bruit heard over the right carotid [Left Carotid Bruit] : no bruit heard over the left carotid [2+] : left 2+ [Bruit] : no bruit heard [No Pitting Edema] : no pitting edema present

## 2020-07-13 NOTE — DISCUSSION/SUMMARY
[FreeTextEntry1] : 79-year-old woman with a history as listed above who presents today for a followup cardiac evaluation.\par \par Yara is complaining of dyspnea on exertion. Given her known coronary calcification She will undergo a nuclear stress test to assess for underlying obstructive CAD. She will get a 2d echo to assess for any  new structural heart disease, changes in valvular and ventricular function. \par Clinically she is euvolemic on exam. \par Her heart rate is much better off of the Coreg. \par her blood pressure is uncontrolled. l. She will continue Losartan 50mg q12, Norvasc  10 mg daily, and increase hydralazine 25mg to q8. She does not want to try any type of diuretic.  She understands and want to continue to try lifestyle modification.  \par \par She has coronary calcifications. Her goal LDL should be less than 100.    Her last lipid profile shows that she is not at goal. Stop her Zocor and switch to Crestor 40mg HS. \par \par  Exercise and diet counseling was performed in order to reduce her future cardiovascular risk. \par \par She will followup with me in 3 months time or sooner if necessary.

## 2020-07-27 ENCOUNTER — APPOINTMENT (OUTPATIENT)
Dept: CARDIOLOGY | Facility: CLINIC | Age: 80
End: 2020-07-27
Payer: MEDICARE

## 2020-07-27 PROCEDURE — 78452 HT MUSCLE IMAGE SPECT MULT: CPT

## 2020-07-27 PROCEDURE — 93015 CV STRESS TEST SUPVJ I&R: CPT

## 2020-07-27 PROCEDURE — A9500: CPT

## 2020-07-29 ENCOUNTER — APPOINTMENT (OUTPATIENT)
Dept: CARDIOLOGY | Facility: CLINIC | Age: 80
End: 2020-07-29
Payer: MEDICARE

## 2020-07-29 PROCEDURE — 93306 TTE W/DOPPLER COMPLETE: CPT

## 2020-08-31 ENCOUNTER — APPOINTMENT (OUTPATIENT)
Dept: INTERNAL MEDICINE | Facility: CLINIC | Age: 80
End: 2020-08-31
Payer: MEDICARE

## 2020-08-31 VITALS
RESPIRATION RATE: 16 BRPM | SYSTOLIC BLOOD PRESSURE: 148 MMHG | TEMPERATURE: 99.2 F | DIASTOLIC BLOOD PRESSURE: 60 MMHG | OXYGEN SATURATION: 97 % | WEIGHT: 158.5 LBS | HEART RATE: 74 BPM | HEIGHT: 61 IN | BODY MASS INDEX: 29.92 KG/M2

## 2020-08-31 PROCEDURE — 99214 OFFICE O/P EST MOD 30 MIN: CPT | Mod: 25

## 2020-08-31 PROCEDURE — 36415 COLL VENOUS BLD VENIPUNCTURE: CPT

## 2020-08-31 NOTE — PHYSICAL EXAM

## 2020-08-31 NOTE — HISTORY OF PRESENT ILLNESS
[FreeTextEntry1] : fasting blood work and general follow-up  [de-identified] : Patient is a 79 year old female with a past medical history as below who presents for fasting blood work and general follow-up. Patient states she is taking all medications as prescribed and denies any adverse reactions or side effects. She denies lightheadedness or dizziness on Losartan Potassium, Hydralazine, and Amlodipine Besylate. She denies any diffuse arthralgias/myalgias on Rosuvastatin Calcium. GERD is well-managed on Omeprazole. Patient notes ENNIS that started in early 2020, but denies wheezing or chest pain. Echocardiogram (7/29/20) revealed moderate aortic regurgitation, normal EF 65-70%, LVH, mild diastolic dysfunction; no significant changes from 9/12/19. Sestamibi nuclear stress test (7/27/20) was normal. Patient states she has not been maintaining a well-balanced diet or exercising regularly. She notes weight gain over the course of the past year, particularly during the quarantine.

## 2020-08-31 NOTE — ASSESSMENT
[FreeTextEntry1] : Patient is a 79 year old female with a past medical history as above who presents for fasting blood work and general follow-up.

## 2020-08-31 NOTE — HEALTH RISK ASSESSMENT
[No] : In the past 12 months have you used drugs other than those required for medical reasons? No [1] : 2) Feeling down, depressed, or hopeless for several days (1) [] : No [Audit-CScore] : 0 [[id number]] : 2

## 2020-08-31 NOTE — PLAN
[FreeTextEntry1] : Pulmonary\par ENNIS - possibly secondary to deconditioning - recommended gradually increasing CV exercise - advised to follow up if SOB/ENNIS persist or worsen \par Cardiology\par hypertension - continue Losartan Potassium 50mg BID p.o.q.d., Hydralazine HCl 25mg TID p.o.q.d., and Amlodipine Besylate 10mg p.o.q.d. - advised low sodium diet and weight loss \par continue Aspirin 325mg p.o.q.d. given history of ASCAD - continue to follow up with cardiologist, Dr. Johnson\par hyperlipidemia - continue Rosuvastatin Calcium 40mg p.o.q.d. - advised low cholesterol/low fat diet - check FLP and LFTs\par Endocrinology\par pre-diabetes (hyperglycemia) - continue Metformin HCl ER 500mg BID p.o.q.d. - advised low carbohydrate/low sugar diet - check hemoglobin A1C \par vitamin D deficiency - continue vitamin D 1000 unit tablets p.o.q.d with meals - check vitamin D  \par Gastroenterology\par GERD - continue Omeprazole 20mg p.o.q.d. - continue antireflux measures\par Psychiatry\par history of anxiety - continue to follow up with psychiatrist, Dr. Thibodeaux\par \par check female panel

## 2020-08-31 NOTE — ADDENDUM
[FreeTextEntry1] : I, Kevin Castaneda, acted solely as scribe for Dr. Edu Boston DO on this date 08/31/2020  1:20PM .\par \par All medical record entries made by the Scribe were at my, Dr. Edu Boston DO direction and personally dictated by me on 08/31/2020  1:20PM. I have reviewed the chart and agree that the record accurately reflects my personal performance of the history, physical exam, assessment and plan. I have also personally directed, reviewed and agreed with the chart.\par

## 2020-09-02 LAB
25(OH)D3 SERPL-MCNC: 52.6 NG/ML
ALBUMIN SERPL ELPH-MCNC: 4.4 G/DL
ALP BLD-CCNC: 96 U/L
ALT SERPL-CCNC: 13 U/L
ANION GAP SERPL CALC-SCNC: 14 MMOL/L
AST SERPL-CCNC: 17 U/L
BASOPHILS # BLD AUTO: 0.1 K/UL
BASOPHILS NFR BLD AUTO: 1.1 %
BILIRUB SERPL-MCNC: 0.4 MG/DL
BUN SERPL-MCNC: 17 MG/DL
CALCIUM SERPL-MCNC: 9.6 MG/DL
CHLORIDE SERPL-SCNC: 104 MMOL/L
CHOLEST SERPL-MCNC: 178 MG/DL
CHOLEST/HDLC SERPL: 4 RATIO
CO2 SERPL-SCNC: 22 MMOL/L
CREAT SERPL-MCNC: 0.9 MG/DL
EOSINOPHIL # BLD AUTO: 0.2 K/UL
EOSINOPHIL NFR BLD AUTO: 2.3 %
ESTIMATED AVERAGE GLUCOSE: 120 MG/DL
GLUCOSE SERPL-MCNC: 115 MG/DL
HBA1C MFR BLD HPLC: 5.8 %
HCT VFR BLD CALC: 43.2 %
HDLC SERPL-MCNC: 45 MG/DL
HGB BLD-MCNC: 13.2 G/DL
IMM GRANULOCYTES NFR BLD AUTO: 0.5 %
LDLC SERPL CALC-MCNC: 102 MG/DL
LYMPHOCYTES # BLD AUTO: 2.11 K/UL
LYMPHOCYTES NFR BLD AUTO: 24.1 %
MAN DIFF?: NORMAL
MCHC RBC-ENTMCNC: 29.5 PG
MCHC RBC-ENTMCNC: 30.6 GM/DL
MCV RBC AUTO: 96.6 FL
MONOCYTES # BLD AUTO: 0.95 K/UL
MONOCYTES NFR BLD AUTO: 10.9 %
NEUTROPHILS # BLD AUTO: 5.34 K/UL
NEUTROPHILS NFR BLD AUTO: 61.1 %
PLATELET # BLD AUTO: 435 K/UL
POTASSIUM SERPL-SCNC: 4.3 MMOL/L
PROT SERPL-MCNC: 6.9 G/DL
RBC # BLD: 4.47 M/UL
RBC # FLD: 13.8 %
SODIUM SERPL-SCNC: 140 MMOL/L
TRIGL SERPL-MCNC: 155 MG/DL
TSH SERPL-ACNC: 2.07 UIU/ML
WBC # FLD AUTO: 8.74 K/UL

## 2020-10-13 ENCOUNTER — APPOINTMENT (OUTPATIENT)
Dept: CARDIOLOGY | Facility: CLINIC | Age: 80
End: 2020-10-13
Payer: MEDICARE

## 2020-10-13 ENCOUNTER — NON-APPOINTMENT (OUTPATIENT)
Age: 80
End: 2020-10-13

## 2020-10-13 ENCOUNTER — TRANSCRIPTION ENCOUNTER (OUTPATIENT)
Age: 80
End: 2020-10-13

## 2020-10-13 VITALS
SYSTOLIC BLOOD PRESSURE: 165 MMHG | OXYGEN SATURATION: 98 % | HEIGHT: 61 IN | WEIGHT: 158 LBS | BODY MASS INDEX: 29.83 KG/M2 | DIASTOLIC BLOOD PRESSURE: 79 MMHG | HEART RATE: 64 BPM

## 2020-10-13 VITALS — SYSTOLIC BLOOD PRESSURE: 140 MMHG | DIASTOLIC BLOOD PRESSURE: 70 MMHG

## 2020-10-13 PROCEDURE — 99214 OFFICE O/P EST MOD 30 MIN: CPT

## 2020-10-13 PROCEDURE — 93000 ELECTROCARDIOGRAM COMPLETE: CPT

## 2020-10-13 NOTE — PHYSICAL EXAM
[Well Groomed] : well groomed [General Appearance - In No Acute Distress] : no acute distress [Normal Conjunctiva] : the conjunctiva exhibited no abnormalities [Eyelids - No Xanthelasma] : the eyelids demonstrated no xanthelasmas [Normal Oral Mucosa] : normal oral mucosa [No Oral Pallor] : no oral pallor [No Oral Cyanosis] : no oral cyanosis [Normal Jugular Venous A Waves Present] : normal jugular venous A waves present [Normal Jugular Venous V Waves Present] : normal jugular venous V waves present [No Jugular Venous Costello A Waves] : no jugular venous costello A waves [Respiration, Rhythm And Depth] : normal respiratory rhythm and effort [Exaggerated Use Of Accessory Muscles For Inspiration] : no accessory muscle use [Auscultation Breath Sounds / Voice Sounds] : lungs were clear to auscultation bilaterally [Abdomen Soft] : soft [Abdomen Tenderness] : non-tender [Abdomen Mass (___ Cm)] : no abdominal mass palpated [Abnormal Walk] : normal gait [Gait - Sufficient For Exercise Testing] : the gait was sufficient for exercise testing [FreeTextEntry1] : + tenderness in left flank [Nail Clubbing] : no clubbing of the fingernails [Cyanosis, Localized] : no localized cyanosis [Petechial Hemorrhages (___cm)] : no petechial hemorrhages [Skin Color & Pigmentation] : normal skin color and pigmentation [] : no rash [No Venous Stasis] : no venous stasis [Skin Lesions] : no skin lesions [No Skin Ulcers] : no skin ulcer [No Xanthoma] : no  xanthoma was observed [Oriented To Time, Place, And Person] : oriented to person, place, and time [Affect] : the affect was normal [Mood] : the mood was normal [No Anxiety] : not feeling anxious [Normal Rate] : normal [Rhythm Regular] : regular [Normal S1] : normal S1 [Normal S2] : normal S2 [No Gallop] : no gallop heard [II] : a grade 2 [I] : a grade 1 [1+] : left 1+ [Right Carotid Bruit] : no bruit heard over the right carotid [Left Carotid Bruit] : no bruit heard over the left carotid [2+] : left 2+ [Bruit] : no bruit heard [No Pitting Edema] : no pitting edema present

## 2020-10-13 NOTE — DISCUSSION/SUMMARY
[FreeTextEntry1] : 79-year-old woman with a history as listed above who presents today for a followup cardiac evaluation.\par \par Yara is complaining  chest pain that is muscular in nature. She may have fractured a rib given her severe osteoporosis. She will go to [facility] now for xrays and further evaluation. She had an nuclear stress test unrevealing for ischemia on 7/2020. She had an echo in that showed normal systolic LV function without any significant other findings, including no significant valvular disease. She had an echo in 7/29/20 that showed normal systolic LV function without any significant other findings, including no significant valvular disease. \par .\par Her heart rate is much better off of the Coreg. \par \par her blood pressure is uncontrolled but in setting of pain she will continue on her current medications. She will continue Losartan 50mg q12, Norvasc  10 mg daily, and  hydralazine 25mg to q8. She does not want to try any type of diuretic.  She understands and want to continue to try lifestyle modification.  \par \par She has coronary calcifications. Her goal LDL should be less than 100.  She will continue  Crestor 40mg HS. \par \par  Exercise and diet counseling was performed in order to reduce her future cardiovascular risk. \par \par She will followup with me in 3 months time or sooner if necessary.

## 2020-10-13 NOTE — HISTORY OF PRESENT ILLNESS
[FreeTextEntry1] : 80-year-old woman with a history of anxiety, hypertension, hyperlipidemia.\par \par She had presented to [facility] with a pneumonia. During her hospitalization she was found to have lateral T wave inversions on EKG which were worse than previously seen. She had echocardiogram that showed normal left ventricular function. Her cardiac enzymes were negative. she had a CAT scan of the chest which was also revealing for coronary artery calcifications and calcifications to the aorta and its main branches. she had nuclear stress test which was unremarkable.\par She had a syncopal episode when she was on Chlorthalidone. She was taken to  in early 2019 and her diuretic was stopped.  \par \par She had an echo in 9/2019 that showed normal systolic LV function without any significant other findings, including no significant valvular disease. \par \par She now presents for a followup visit.\par Since her last visit, two days ago the hurt her left flank when reaching for something in her washer. She has significant pain in the area. She is splinting on exam.\par She is still feeling dyspneic on exertion after   climbing the stairs.  \par Her palpitations have resolved.    She denies any anginal symptoms. She denies any  PND, orthopnea,  edema,  syncope, stroke like symptoms. She denies any blurry vision, headaches or recent stroke like symptoms. she's been compliant with her medications.

## 2020-10-15 ENCOUNTER — APPOINTMENT (OUTPATIENT)
Dept: CT IMAGING | Facility: CLINIC | Age: 80
End: 2020-10-15
Payer: MEDICARE

## 2020-10-15 ENCOUNTER — RESULT REVIEW (OUTPATIENT)
Age: 80
End: 2020-10-15

## 2020-10-15 ENCOUNTER — OUTPATIENT (OUTPATIENT)
Dept: OUTPATIENT SERVICES | Facility: HOSPITAL | Age: 80
LOS: 1 days | End: 2020-10-15
Payer: MEDICARE

## 2020-10-15 DIAGNOSIS — Z98.89 OTHER SPECIFIED POSTPROCEDURAL STATES: Chronic | ICD-10-CM

## 2020-10-15 DIAGNOSIS — Z98.890 OTHER SPECIFIED POSTPROCEDURAL STATES: Chronic | ICD-10-CM

## 2020-10-15 DIAGNOSIS — R07.81 PLEURODYNIA: ICD-10-CM

## 2020-10-15 PROCEDURE — 71250 CT THORAX DX C-: CPT | Mod: 26

## 2020-10-15 PROCEDURE — 71250 CT THORAX DX C-: CPT

## 2020-11-24 ENCOUNTER — EMERGENCY (EMERGENCY)
Facility: HOSPITAL | Age: 80
LOS: 1 days | Discharge: HOME | End: 2020-11-24
Attending: EMERGENCY MEDICINE | Admitting: EMERGENCY MEDICINE
Payer: MEDICARE

## 2020-11-24 VITALS
DIASTOLIC BLOOD PRESSURE: 66 MMHG | RESPIRATION RATE: 16 BRPM | OXYGEN SATURATION: 97 % | TEMPERATURE: 98 F | HEART RATE: 60 BPM | SYSTOLIC BLOOD PRESSURE: 128 MMHG

## 2020-11-24 VITALS
WEIGHT: 158.07 LBS | HEIGHT: 61 IN | HEART RATE: 50 BPM | SYSTOLIC BLOOD PRESSURE: 149 MMHG | OXYGEN SATURATION: 100 % | DIASTOLIC BLOOD PRESSURE: 62 MMHG | TEMPERATURE: 97 F | RESPIRATION RATE: 28 BRPM

## 2020-11-24 DIAGNOSIS — Z98.89 OTHER SPECIFIED POSTPROCEDURAL STATES: Chronic | ICD-10-CM

## 2020-11-24 DIAGNOSIS — Z98.890 OTHER SPECIFIED POSTPROCEDURAL STATES: Chronic | ICD-10-CM

## 2020-11-24 LAB
ALBUMIN SERPL ELPH-MCNC: 3.3 G/DL — SIGNIFICANT CHANGE UP (ref 3.3–5)
ALP SERPL-CCNC: 111 U/L — SIGNIFICANT CHANGE UP (ref 40–120)
ALT FLD-CCNC: 19 U/L — SIGNIFICANT CHANGE UP (ref 12–78)
ANION GAP SERPL CALC-SCNC: 7 MMOL/L — SIGNIFICANT CHANGE UP (ref 5–17)
APTT BLD: 28.4 SEC — SIGNIFICANT CHANGE UP (ref 27.5–35.5)
AST SERPL-CCNC: 16 U/L — SIGNIFICANT CHANGE UP (ref 15–37)
BASOPHILS # BLD AUTO: 0.07 K/UL — SIGNIFICANT CHANGE UP (ref 0–0.2)
BASOPHILS NFR BLD AUTO: 0.9 % — SIGNIFICANT CHANGE UP (ref 0–2)
BILIRUB SERPL-MCNC: 0.4 MG/DL — SIGNIFICANT CHANGE UP (ref 0.2–1.2)
BUN SERPL-MCNC: 15 MG/DL — SIGNIFICANT CHANGE UP (ref 7–23)
CALCIUM SERPL-MCNC: 9.1 MG/DL — SIGNIFICANT CHANGE UP (ref 8.5–10.1)
CHLORIDE SERPL-SCNC: 106 MMOL/L — SIGNIFICANT CHANGE UP (ref 96–108)
CO2 SERPL-SCNC: 27 MMOL/L — SIGNIFICANT CHANGE UP (ref 22–31)
CREAT SERPL-MCNC: 1 MG/DL — SIGNIFICANT CHANGE UP (ref 0.5–1.3)
EOSINOPHIL # BLD AUTO: 0.27 K/UL — SIGNIFICANT CHANGE UP (ref 0–0.5)
EOSINOPHIL NFR BLD AUTO: 3.3 % — SIGNIFICANT CHANGE UP (ref 0–6)
GLUCOSE SERPL-MCNC: 116 MG/DL — HIGH (ref 70–99)
HCT VFR BLD CALC: 41.2 % — SIGNIFICANT CHANGE UP (ref 34.5–45)
HGB BLD-MCNC: 13.9 G/DL — SIGNIFICANT CHANGE UP (ref 11.5–15.5)
IMM GRANULOCYTES NFR BLD AUTO: 0.5 % — SIGNIFICANT CHANGE UP (ref 0–1.5)
INR BLD: 1.01 RATIO — SIGNIFICANT CHANGE UP (ref 0.88–1.16)
LACTATE SERPL-SCNC: 2 MMOL/L — SIGNIFICANT CHANGE UP (ref 0.7–2)
LYMPHOCYTES # BLD AUTO: 1.92 K/UL — SIGNIFICANT CHANGE UP (ref 1–3.3)
LYMPHOCYTES # BLD AUTO: 23.8 % — SIGNIFICANT CHANGE UP (ref 13–44)
MCHC RBC-ENTMCNC: 30 PG — SIGNIFICANT CHANGE UP (ref 27–34)
MCHC RBC-ENTMCNC: 33.7 GM/DL — SIGNIFICANT CHANGE UP (ref 32–36)
MCV RBC AUTO: 89 FL — SIGNIFICANT CHANGE UP (ref 80–100)
MONOCYTES # BLD AUTO: 0.75 K/UL — SIGNIFICANT CHANGE UP (ref 0–0.9)
MONOCYTES NFR BLD AUTO: 9.3 % — SIGNIFICANT CHANGE UP (ref 2–14)
NEUTROPHILS # BLD AUTO: 5.01 K/UL — SIGNIFICANT CHANGE UP (ref 1.8–7.4)
NEUTROPHILS NFR BLD AUTO: 62.2 % — SIGNIFICANT CHANGE UP (ref 43–77)
NRBC # BLD: 0 /100 WBCS — SIGNIFICANT CHANGE UP (ref 0–0)
PLATELET # BLD AUTO: 412 K/UL — HIGH (ref 150–400)
POTASSIUM SERPL-MCNC: 3.8 MMOL/L — SIGNIFICANT CHANGE UP (ref 3.5–5.3)
POTASSIUM SERPL-SCNC: 3.8 MMOL/L — SIGNIFICANT CHANGE UP (ref 3.5–5.3)
PROT SERPL-MCNC: 7.4 G/DL — SIGNIFICANT CHANGE UP (ref 6–8.3)
PROTHROM AB SERPL-ACNC: 11.8 SEC — SIGNIFICANT CHANGE UP (ref 10.6–13.6)
RBC # BLD: 4.63 M/UL — SIGNIFICANT CHANGE UP (ref 3.8–5.2)
RBC # FLD: 13.1 % — SIGNIFICANT CHANGE UP (ref 10.3–14.5)
SARS-COV-2 RNA SPEC QL NAA+PROBE: SIGNIFICANT CHANGE UP
SODIUM SERPL-SCNC: 140 MMOL/L — SIGNIFICANT CHANGE UP (ref 135–145)
TROPONIN I SERPL-MCNC: <.015 NG/ML — SIGNIFICANT CHANGE UP (ref 0.01–0.04)
WBC # BLD: 8.06 K/UL — SIGNIFICANT CHANGE UP (ref 3.8–10.5)
WBC # FLD AUTO: 8.06 K/UL — SIGNIFICANT CHANGE UP (ref 3.8–10.5)

## 2020-11-24 PROCEDURE — 70450 CT HEAD/BRAIN W/O DYE: CPT

## 2020-11-24 PROCEDURE — 71045 X-RAY EXAM CHEST 1 VIEW: CPT | Mod: 26

## 2020-11-24 PROCEDURE — 99284 EMERGENCY DEPT VISIT MOD MDM: CPT | Mod: 25

## 2020-11-24 PROCEDURE — 85025 COMPLETE CBC W/AUTO DIFF WBC: CPT

## 2020-11-24 PROCEDURE — 70450 CT HEAD/BRAIN W/O DYE: CPT | Mod: 26

## 2020-11-24 PROCEDURE — 71045 X-RAY EXAM CHEST 1 VIEW: CPT

## 2020-11-24 PROCEDURE — U0003: CPT

## 2020-11-24 PROCEDURE — 84443 ASSAY THYROID STIM HORMONE: CPT

## 2020-11-24 PROCEDURE — 85730 THROMBOPLASTIN TIME PARTIAL: CPT

## 2020-11-24 PROCEDURE — 93005 ELECTROCARDIOGRAM TRACING: CPT

## 2020-11-24 PROCEDURE — 84484 ASSAY OF TROPONIN QUANT: CPT

## 2020-11-24 PROCEDURE — 36415 COLL VENOUS BLD VENIPUNCTURE: CPT

## 2020-11-24 PROCEDURE — 96375 TX/PRO/DX INJ NEW DRUG ADDON: CPT

## 2020-11-24 PROCEDURE — 96361 HYDRATE IV INFUSION ADD-ON: CPT

## 2020-11-24 PROCEDURE — 85610 PROTHROMBIN TIME: CPT

## 2020-11-24 PROCEDURE — 93010 ELECTROCARDIOGRAM REPORT: CPT

## 2020-11-24 PROCEDURE — 80053 COMPREHEN METABOLIC PANEL: CPT

## 2020-11-24 PROCEDURE — 83605 ASSAY OF LACTIC ACID: CPT

## 2020-11-24 PROCEDURE — 99285 EMERGENCY DEPT VISIT HI MDM: CPT | Mod: CS

## 2020-11-24 PROCEDURE — 87040 BLOOD CULTURE FOR BACTERIA: CPT

## 2020-11-24 PROCEDURE — 96374 THER/PROPH/DIAG INJ IV PUSH: CPT

## 2020-11-24 RX ORDER — CHOLECALCIFEROL (VITAMIN D3) 125 MCG
1 CAPSULE ORAL
Qty: 0 | Refills: 0 | DISCHARGE

## 2020-11-24 RX ORDER — FERROUS SULFATE 325(65) MG
1 TABLET ORAL
Qty: 0 | Refills: 0 | DISCHARGE

## 2020-11-24 RX ORDER — SERTRALINE 25 MG/1
1 TABLET, FILM COATED ORAL
Qty: 0 | Refills: 0 | DISCHARGE

## 2020-11-24 RX ORDER — METFORMIN HYDROCHLORIDE 850 MG/1
1 TABLET ORAL
Qty: 0 | Refills: 0 | DISCHARGE

## 2020-11-24 RX ORDER — LOSARTAN POTASSIUM 100 MG/1
1 TABLET, FILM COATED ORAL
Qty: 0 | Refills: 0 | DISCHARGE

## 2020-11-24 RX ORDER — DIPHENHYDRAMINE HCL 50 MG
25 CAPSULE ORAL ONCE
Refills: 0 | Status: DISCONTINUED | OUTPATIENT
Start: 2020-11-24 | End: 2020-11-24

## 2020-11-24 RX ORDER — METOCLOPRAMIDE HCL 10 MG
10 TABLET ORAL ONCE
Refills: 0 | Status: COMPLETED | OUTPATIENT
Start: 2020-11-24 | End: 2020-11-24

## 2020-11-24 RX ORDER — KETOROLAC TROMETHAMINE 30 MG/ML
15 SYRINGE (ML) INJECTION ONCE
Refills: 0 | Status: DISCONTINUED | OUTPATIENT
Start: 2020-11-24 | End: 2020-11-24

## 2020-11-24 RX ORDER — SIMVASTATIN 20 MG/1
1 TABLET, FILM COATED ORAL
Qty: 0 | Refills: 0 | DISCHARGE

## 2020-11-24 RX ORDER — HYDRALAZINE HCL 50 MG
0 TABLET ORAL
Qty: 0 | Refills: 0 | DISCHARGE

## 2020-11-24 RX ORDER — HYDRALAZINE HCL 50 MG
1 TABLET ORAL
Qty: 0 | Refills: 0 | DISCHARGE

## 2020-11-24 RX ORDER — DIPHENHYDRAMINE HCL 50 MG
12.5 CAPSULE ORAL ONCE
Refills: 0 | Status: COMPLETED | OUTPATIENT
Start: 2020-11-24 | End: 2020-11-24

## 2020-11-24 RX ORDER — OMEPRAZOLE 10 MG/1
1 CAPSULE, DELAYED RELEASE ORAL
Qty: 0 | Refills: 0 | DISCHARGE

## 2020-11-24 RX ORDER — AMLODIPINE BESYLATE 2.5 MG/1
1 TABLET ORAL
Qty: 0 | Refills: 0 | DISCHARGE

## 2020-11-24 RX ORDER — SODIUM CHLORIDE 9 MG/ML
1000 INJECTION INTRAMUSCULAR; INTRAVENOUS; SUBCUTANEOUS ONCE
Refills: 0 | Status: COMPLETED | OUTPATIENT
Start: 2020-11-24 | End: 2020-11-24

## 2020-11-24 RX ADMIN — SODIUM CHLORIDE 1000 MILLILITER(S): 9 INJECTION INTRAMUSCULAR; INTRAVENOUS; SUBCUTANEOUS at 09:13

## 2020-11-24 RX ADMIN — Medication 12.5 MILLIGRAM(S): at 08:09

## 2020-11-24 RX ADMIN — Medication 15 MILLIGRAM(S): at 08:08

## 2020-11-24 RX ADMIN — Medication 15 MILLIGRAM(S): at 09:13

## 2020-11-24 RX ADMIN — SODIUM CHLORIDE 1000 MILLILITER(S): 9 INJECTION INTRAMUSCULAR; INTRAVENOUS; SUBCUTANEOUS at 08:08

## 2020-11-24 RX ADMIN — Medication 10 MILLIGRAM(S): at 08:08

## 2020-11-24 NOTE — ED ADULT NURSE NOTE - NSIMPLEMENTINTERV_GEN_ALL_ED
Implemented All Fall with Harm Risk Interventions:  [place] to call system. Call bell, personal items and telephone within reach. Instruct patient to call for assistance. Room bathroom lighting operational. Non-slip footwear when patient is off stretcher. Physically safe environment: no spills, clutter or unnecessary equipment. Stretcher in lowest position, wheels locked, appropriate side rails in place. Provide visual cue, wrist band, yellow gown, etc. Monitor gait and stability. Monitor for mental status changes and reorient to person, place, and time. Review medications for side effects contributing to fall risk. Reinforce activity limits and safety measures with patient and family. Provide visual clues: red socks.

## 2020-11-24 NOTE — ED PROVIDER NOTE - NS ED ROS FT
Constitutional: - Fever, - Chills, - Anorexia, - Fatigue, - Night sweats  Eyes: - Discharge, - Irritation, - Redness, - Visual changes, - Light sensitivity, - Pain  EARS: - Ear Pain, - Tinnitus, - Decreased hearing  NOSE: - Congestion, - Bloody nose  MOUTH/THROAT: - Vocal Changes, - Drooling, - Sore throat  NECK: - Lumps, - Stiffness, - Pain  CV: - Palpitations, - Chest Pain, - Edema, - Syncope  RESP:  - Cough, - Shortness of Breath, - Dyspnea on Exertion, - Trouble speaking, - Pleuritic pain - Wheezing  GI: - Diarrhea, - Constipation, - Bloody stools, - Nausea, - Vomiting, - Abdominal Pain  : - Dysuria, -Frequency, - Hematuria, - Hesitancy, - Incontinence, - Saddle Anesthesia, - Abnormal discharge  MSK: - Myalgias, - Arthralgias, + Weakness, - Deformities, - Injuries  SKIN: - Color change, - Rash, - Swelling, - Ecchymosis, - Abrasion, - laceration  NEURO: - Change in behavior, - Dec. Alertness, + Headache, - Dizziness, - Change in speech, - Weakness, - Seizure-like activity, - Difficulty ambulating

## 2020-11-24 NOTE — ED PROVIDER NOTE - CARE PLAN
Principal Discharge DX:	Headache  Secondary Diagnosis:	Malaise and fatigue  Secondary Diagnosis:	Dizziness

## 2020-11-24 NOTE — ED PROVIDER NOTE - NSFOLLOWUPINSTRUCTIONS_ED_ALL_ED_FT
Follow up with your doctor this week  Plenty of rest, plenty of fluids  Any worsening symptoms return to ER for further evaluation.

## 2020-11-24 NOTE — ED PROVIDER NOTE - PROGRESS NOTE DETAILS
received signout from Dr Flores pending labs and reassessment.  pt feels much better after HA cocktail and fluids.  states her symptoms have been longstanding.  will follow up with PMD this week.  son did not have any further concerns.  will dc home.  MD Fabiola

## 2020-11-24 NOTE — ED PROVIDER NOTE - PHYSICAL EXAMINATION
Gen: Alert, NAD  Head/eyes: NC/AT, PERRL, EOMI  ENT: airway patent  Neck: supple, no tenderness/meningismus/JVD, Trachea midline  Pulm/lung: Bilateral clear BS, normal resp effort, no wheeze/stridor/retractions  CV/heart: RRR, no M/R/G, +2 dist pulses (radial, pedal DP/PT, popliteal)  GI/Abd: soft, NT/ND, +BS, no guarding/rebound tenderness  Musculoskeletal: no edema/erythema/cyanosis, FROM in all extremities, no C/T/L spine ttp  Skin: no rash, no vesicles, no petechaie, no ecchymosis, no swelling  Neuro: AAOx3, CN 2-12 intact, normal sensation, 5/5 motor strength in all extremities,  no dysmetria, NIHSS = 0

## 2020-11-24 NOTE — ED ADULT TRIAGE NOTE - WEIGHT IN KG
Flu vaccine in October  Yearly checkup  Well-Child Checkup: 14 to 18 Years     Stay involved in your teen’s life. Make sure your teen knows you’re always there when he or she needs to talk.    During the teen years, it’s important to keep having yearly ch · Body changes. The body grows and matures during puberty. Hair will grow in the pubic area and on other parts of the body. Girls grow breasts and menstruate (have monthly periods). A boy’s voice changes, becoming lower and deeper.  As the penis matures, er · Eat healthy. Your child should eat fruits, vegetables, lean meats, and whole grains every day. Less healthy foods—like french fries, candy, and chips—should be eaten rarely.  Some teens fall into the trap of snacking on junk food and fast food throughout · Encourage your teen to keep a consistent bedtime, even on weekends. Sleeping is easier when the body follows a routine. Don’t let your teen stay up too late at night or sleep in too long in the morning. · Help your teen wake up, if needed.  Go into the b · Set rules and limits around driving and use of the car. If your teen gets a ticket or has an accident, there should be consequences. Driving is a privilege that can be taken away if your child doesn’t follow the rules.   · Teach your child to make good de © 8858-2290 [employer] [address]. [address] [facility] [place] [address] [place]. All rights reserved. This information is not intended as a substitute for professional medical care. Always follow your healthcare professional's instructions.         Healthy o Preparing foods at home as a family  o Eating a diet rich in calcium  o Eating a high fiber diet    Help your children form healthy habits. Healthy active children are more likely to be healthy active adults! 71.7

## 2020-11-24 NOTE — ED ADULT NURSE NOTE - PMH
CAD (coronary atherosclerotic disease)    Depression    GERD (gastroesophageal reflux disease)    High cholesterol    Hypertension    Prediabetes    Thyroid cancer

## 2020-11-24 NOTE — ED PROVIDER NOTE - OBJECTIVE STATEMENT
81 yo female hx of cad, depression, gerd, htn, c/o intermittent chills and weakness for several weeks, feels like she has no energy.  Denies any fever.  +headaches and nausea.  No slurred speech, no visual changes.  No medications taken for this.      pmd izzy

## 2020-11-24 NOTE — ED PROVIDER NOTE - PATIENT PORTAL LINK FT
You can access the FollowMyHealth Patient Portal offered by [facility] by registering at the following website: [email or web address]. By joining [employer]’s FollowMyHealth portal, you will also be able to view your health information using other applications (apps) compatible with our system.

## 2020-12-30 ENCOUNTER — APPOINTMENT (OUTPATIENT)
Dept: INTERNAL MEDICINE | Facility: CLINIC | Age: 80
End: 2020-12-30
Payer: MEDICARE

## 2020-12-30 ENCOUNTER — NON-APPOINTMENT (OUTPATIENT)
Age: 80
End: 2020-12-30

## 2020-12-30 ENCOUNTER — LABORATORY RESULT (OUTPATIENT)
Age: 80
End: 2020-12-30

## 2020-12-30 VITALS
SYSTOLIC BLOOD PRESSURE: 140 MMHG | BODY MASS INDEX: 29.83 KG/M2 | TEMPERATURE: 97.8 F | HEIGHT: 61 IN | DIASTOLIC BLOOD PRESSURE: 75 MMHG | WEIGHT: 158 LBS

## 2020-12-30 PROCEDURE — 99214 OFFICE O/P EST MOD 30 MIN: CPT | Mod: 25

## 2020-12-30 PROCEDURE — 36415 COLL VENOUS BLD VENIPUNCTURE: CPT

## 2021-01-03 NOTE — HEALTH RISK ASSESSMENT
[Fully functional (bathing, dressing, toileting, transferring, walking, feeding)] : Fully functional (bathing, dressing, toileting, transferring, walking, feeding) [Fully functional (using the telephone, shopping, preparing meals, housekeeping, doing laundry, using] : Fully functional and needs no help or supervision to perform IADLs (using the telephone, shopping, preparing meals, housekeeping, doing laundry, using transportation, managing medications and managing finances) [No] : In the past 12 months have you used drugs other than those required for medical reasons? No [1] : 2) Feeling down, depressed, or hopeless for several days (1) [] : No [Audit-CScore] : 0 [[id number]] : 2

## 2021-01-03 NOTE — PHYSICAL EXAM
[No Acute Distress] : no acute distress [Well Nourished] : well nourished [Well Developed] : well developed [Well-Appearing] : well-appearing [Normal Sclera/Conjunctiva] : normal sclera/conjunctiva [PERRL] : pupils equal round and reactive to light [EOMI] : extraocular movements intact [Normal Outer Ear/Nose] : the outer ears and nose were normal in appearance [Normal Oropharynx] : the oropharynx was normal [No JVD] : no jugular venous distention [No Lymphadenopathy] : no lymphadenopathy [Supple] : supple [Thyroid Normal, No Nodules] : the thyroid was normal and there were no nodules present [No Respiratory Distress] : no respiratory distress  [No Accessory Muscle Use] : no accessory muscle use [Clear to Auscultation] : lungs were clear to auscultation bilaterally [Normal Rate] : normal rate  [Regular Rhythm] : with a regular rhythm [Normal S1, S2] : normal S1 and S2 [No Murmur] : no murmur heard [No Carotid Bruits] : no carotid bruits [No Abdominal Bruit] : a ~M bruit was not heard ~T in the abdomen [No Varicosities] : no varicosities [Pedal Pulses Present] : the pedal pulses are present [No Edema] : there was no peripheral edema [No Palpable Aorta] : no palpable aorta [No Extremity Clubbing/Cyanosis] : no extremity clubbing/cyanosis [Soft] : abdomen soft [Non Tender] : non-tender [Non-distended] : non-distended [No Masses] : no abdominal mass palpated [No HSM] : no HSM [Normal Bowel Sounds] : normal bowel sounds [Normal Posterior Cervical Nodes] : no posterior cervical lymphadenopathy [Normal Anterior Cervical Nodes] : no anterior cervical lymphadenopathy [No CVA Tenderness] : no CVA  tenderness [No Spinal Tenderness] : no spinal tenderness [No Joint Swelling] : no joint swelling [Grossly Normal Strength/Tone] : grossly normal strength/tone [No Rash] : no rash [Coordination Grossly Intact] : coordination grossly intact [No Focal Deficits] : no focal deficits [Normal Gait] : normal gait [Normal Affect] : the affect was normal [Normal Insight/Judgement] : insight and judgment were intact [de-identified] : overweight

## 2021-01-03 NOTE — ADDENDUM
[FreeTextEntry1] : I, Kevin Castaneda, acted solely as scribe for Dr. Edu Boston DO on this date 12/30/2020 12:00PM .\par \par All medical record entries made by the Scribe were at my, Dr. Edu Boston DO direction and personally dictated by me on 12/30/2020 12:00PM. I have reviewed the chart and agree that the record accurately reflects my personal performance of the history, physical exam, assessment and plan. I have also personally directed, reviewed and agreed with the chart.\par

## 2021-01-03 NOTE — PLAN
[FreeTextEntry1] : Constitutional\par fatigue - check CBC, CMP, and thyroid panel\par chills - check thyroid panel\par Cardiology\par hypertension - continue Losartan Potassium 50mg BID p.o.q.d., Hydralazine HCl 25mg TID p.o.q.d., and Amlodipine Besylate 10mg p.o.q.d. - advised low sodium diet and weight loss \par hyperlipidemia - continue Rosuvastatin Calcium 40mg p.o.q.d. - advised low cholesterol/low fat diet - check FLP and LFTs\par continue Aspirin 325mg p.o.q.d. and Rosuvastatin Calcium 40mg p.o.q.d. given history of ASCAD - continue to follow up with cardiologist, Dr. Johnson\par Endocrinology\par pre-diabetes (hyperglycemia) - continue Metformin HCl ER 500mg BID p.o.q.d. as directed - advised low carbohydrate/low sugar diet - check hemoglobin A1C \par vitamin D deficiency - continue vitamin D 1000 IU p.o.q.d with meals as directed - check vitamin D  \par Gastroenterology\par GERD - continue Omeprazole 20mg p.o.q.d. - continue antireflux measures\par Psychiatry\par history of anxiety - check serum vitamin B12 - follow up with psychiatrist, Dr. Thibodeaux\par \par check female panel, hemoglobin A1C, thyroid panel, and serum vitamin B12\par Advised to RTO in 3 months for fasting blood work.

## 2021-01-10 LAB
25(OH)D3 SERPL-MCNC: 57.1 NG/ML
ALBUMIN SERPL ELPH-MCNC: 4.1 G/DL
ALP BLD-CCNC: 112 U/L
ALT SERPL-CCNC: 13 U/L
ANION GAP SERPL CALC-SCNC: 16 MMOL/L
AST SERPL-CCNC: 17 U/L
BASOPHILS # BLD AUTO: 0.09 K/UL
BASOPHILS NFR BLD AUTO: 1 %
BILIRUB SERPL-MCNC: 0.5 MG/DL
BUN SERPL-MCNC: 15 MG/DL
CALCIUM SERPL-MCNC: 9.9 MG/DL
CHLORIDE SERPL-SCNC: 102 MMOL/L
CHOLEST SERPL-MCNC: 166 MG/DL
CO2 SERPL-SCNC: 23 MMOL/L
CREAT SERPL-MCNC: 1.04 MG/DL
EOSINOPHIL # BLD AUTO: 0.17 K/UL
EOSINOPHIL NFR BLD AUTO: 2 %
ESTIMATED AVERAGE GLUCOSE: 123 MG/DL
GLUCOSE SERPL-MCNC: 105 MG/DL
HBA1C MFR BLD HPLC: 5.9 %
HCT VFR BLD CALC: 43.2 %
HDLC SERPL-MCNC: 52 MG/DL
HGB BLD-MCNC: 13.2 G/DL
IMM GRANULOCYTES NFR BLD AUTO: 0.5 %
LDLC SERPL CALC-MCNC: 96 MG/DL
LYMPHOCYTES # BLD AUTO: 1.96 K/UL
LYMPHOCYTES NFR BLD AUTO: 22.6 %
MAN DIFF?: NORMAL
MCHC RBC-ENTMCNC: 29.6 PG
MCHC RBC-ENTMCNC: 30.6 GM/DL
MCV RBC AUTO: 96.9 FL
MONOCYTES # BLD AUTO: 0.8 K/UL
MONOCYTES NFR BLD AUTO: 9.2 %
NEUTROPHILS # BLD AUTO: 5.61 K/UL
NEUTROPHILS NFR BLD AUTO: 64.7 %
NONHDLC SERPL-MCNC: 114 MG/DL
PLATELET # BLD AUTO: 463 K/UL
POTASSIUM SERPL-SCNC: 4.7 MMOL/L
PROT SERPL-MCNC: 7 G/DL
RBC # BLD: 4.46 M/UL
RBC # FLD: 13.6 %
SODIUM SERPL-SCNC: 141 MMOL/L
T3 SERPL-MCNC: 117 NG/DL
T3FREE SERPL-MCNC: 2.76 PG/ML
T3RU NFR SERPL: 1.1 TBI
T4 FREE SERPL-MCNC: 1.2 NG/DL
T4 SERPL-MCNC: 8.3 UG/DL
THYROGLOB AB SERPL-ACNC: <20 IU/ML
THYROPEROXIDASE AB SERPL IA-ACNC: <10 IU/ML
TRIGL SERPL-MCNC: 89 MG/DL
TSH SERPL-ACNC: 1.46 UIU/ML
VIT B12 SERPL-MCNC: 598 PG/ML
WBC # FLD AUTO: 8.67 K/UL

## 2021-01-13 ENCOUNTER — APPOINTMENT (OUTPATIENT)
Dept: CARDIOLOGY | Facility: CLINIC | Age: 81
End: 2021-01-13
Payer: MEDICARE

## 2021-01-13 ENCOUNTER — NON-APPOINTMENT (OUTPATIENT)
Age: 81
End: 2021-01-13

## 2021-01-13 VITALS — SYSTOLIC BLOOD PRESSURE: 128 MMHG | DIASTOLIC BLOOD PRESSURE: 72 MMHG

## 2021-01-13 VITALS
DIASTOLIC BLOOD PRESSURE: 80 MMHG | HEART RATE: 65 BPM | SYSTOLIC BLOOD PRESSURE: 164 MMHG | BODY MASS INDEX: 29.83 KG/M2 | HEIGHT: 61 IN | WEIGHT: 158 LBS | OXYGEN SATURATION: 97 %

## 2021-01-13 PROCEDURE — 99214 OFFICE O/P EST MOD 30 MIN: CPT

## 2021-01-13 PROCEDURE — 93000 ELECTROCARDIOGRAM COMPLETE: CPT

## 2021-01-13 NOTE — PHYSICAL EXAM
[Well Groomed] : well groomed [General Appearance - In No Acute Distress] : no acute distress [Normal Conjunctiva] : the conjunctiva exhibited no abnormalities [Eyelids - No Xanthelasma] : the eyelids demonstrated no xanthelasmas [Normal Oral Mucosa] : normal oral mucosa [No Oral Pallor] : no oral pallor [No Oral Cyanosis] : no oral cyanosis [Normal Jugular Venous A Waves Present] : normal jugular venous A waves present [Normal Jugular Venous V Waves Present] : normal jugular venous V waves present [No Jugular Venous Costello A Waves] : no jugular venous costello A waves [Respiration, Rhythm And Depth] : normal respiratory rhythm and effort [Exaggerated Use Of Accessory Muscles For Inspiration] : no accessory muscle use [Auscultation Breath Sounds / Voice Sounds] : lungs were clear to auscultation bilaterally [Abdomen Soft] : soft [Abdomen Tenderness] : non-tender [Abdomen Mass (___ Cm)] : no abdominal mass palpated [Gait - Sufficient For Exercise Testing] : the gait was sufficient for exercise testing [Abnormal Walk] : normal gait [FreeTextEntry1] : + tenderness in left flank [Nail Clubbing] : no clubbing of the fingernails [Cyanosis, Localized] : no localized cyanosis [Petechial Hemorrhages (___cm)] : no petechial hemorrhages [Skin Color & Pigmentation] : normal skin color and pigmentation [] : no rash [No Venous Stasis] : no venous stasis [Skin Lesions] : no skin lesions [No Skin Ulcers] : no skin ulcer [No Xanthoma] : no  xanthoma was observed [Oriented To Time, Place, And Person] : oriented to person, place, and time [Affect] : the affect was normal [Mood] : the mood was normal [No Anxiety] : not feeling anxious [Normal Rate] : normal [Rhythm Regular] : regular [Normal S1] : normal S1 [Normal S2] : normal S2 [No Gallop] : no gallop heard [II] : a grade 2 [I] : a grade 1 [1+] : left 1+ [Right Carotid Bruit] : no bruit heard over the right carotid [Left Carotid Bruit] : no bruit heard over the left carotid [2+] : left 2+ [Bruit] : no bruit heard [No Pitting Edema] : no pitting edema present

## 2021-01-13 NOTE — HISTORY OF PRESENT ILLNESS
[FreeTextEntry1] : 80-year-old woman with a history of anxiety, hypertension, hyperlipidemia.\par \par She had presented to [facility] with a pneumonia. During her hospitalization she was found to have lateral T wave inversions on EKG which were worse than previously seen. She had echocardiogram that showed normal left ventricular function. Her cardiac enzymes were negative. she had a CAT scan of the chest which was also revealing for coronary artery calcifications and calcifications to the aorta and its main branches. she had nuclear stress test which was unremarkable.\par She had a syncopal episode when she was on Chlorthalidone. She was taken to [facility] in early 2019 and her diuretic was stopped.  \par \par She had an echo in 9/2019 that showed normal systolic LV function without any significant other findings, including no significant valvular disease. \par \par She now presents for a followup visit.\par Since her last visit, she has been chronic chills at night. She was recommended to go to an ID doctor. \par She is still feeling dyspneic on exertion after   climbing the stairs.  This has not changed. \par Her palpitations have resolved.    She denies any anginal symptoms. She denies any  PND, orthopnea,  edema,  syncope, stroke like symptoms. She denies any blurry vision, headaches or recent stroke like symptoms. she's been compliant with her medications.

## 2021-01-13 NOTE — DISCUSSION/SUMMARY
[FreeTextEntry1] : 80-year-old woman with a history as listed above who presents today for a followup cardiac evaluation.\par \par Yara is  relatively stable from a CV pOV. She denies any anginal symptoms. Clinically she is euvolemic on exam. Her EKG is unchanged from previous.  She had an nuclear stress test unrevealing for ischemia on 7/2020. She had an echo in that showed normal systolic LV function without any significant other findings, including no significant valvular disease. She had an echo in 7/29/20 that showed normal systolic LV function without any significant other findings, including no significant valvular disease. \par \par She is planning on seeing ID in regards to her chills. \par \par Her heart rate is much better off of the Coreg. \par \par her blood pressure is controlled but in setting of pain she will continue on her current medications. She will continue Losartan 50mg q12, Norvasc  10 mg daily, and  hydralazine 25mg q8. She does not want to try any type of diuretic.  She understands and want to continue to try lifestyle modification.  \par \par She has coronary calcifications. Her goal LDL should be less than 100.  She will continue  Crestor 40mg HS. \par \par  Exercise and diet counseling was performed in order to reduce her future cardiovascular risk. \par \par She will followup with me in 3 months time or sooner if necessary.

## 2021-03-10 ENCOUNTER — APPOINTMENT (OUTPATIENT)
Dept: INTERNAL MEDICINE | Facility: CLINIC | Age: 81
End: 2021-03-10
Payer: MEDICARE

## 2021-03-10 VITALS
SYSTOLIC BLOOD PRESSURE: 130 MMHG | HEIGHT: 61 IN | TEMPERATURE: 97.1 F | OXYGEN SATURATION: 97 % | RESPIRATION RATE: 15 BRPM | DIASTOLIC BLOOD PRESSURE: 64 MMHG | HEART RATE: 63 BPM | WEIGHT: 163 LBS | BODY MASS INDEX: 30.78 KG/M2

## 2021-03-10 DIAGNOSIS — Z01.419 ENCOUNTER FOR GYNECOLOGICAL EXAMINATION (GENERAL) (ROUTINE) W/OUT ABNORMAL FINDINGS: ICD-10-CM

## 2021-03-10 PROCEDURE — 36415 COLL VENOUS BLD VENIPUNCTURE: CPT

## 2021-03-10 PROCEDURE — 99214 OFFICE O/P EST MOD 30 MIN: CPT | Mod: 25

## 2021-03-10 NOTE — PHYSICAL EXAM
[No Acute Distress] : no acute distress [Well Nourished] : well nourished [Well Developed] : well developed [Well-Appearing] : well-appearing [Normal Sclera/Conjunctiva] : normal sclera/conjunctiva [PERRL] : pupils equal round and reactive to light [EOMI] : extraocular movements intact [Normal Outer Ear/Nose] : the outer ears and nose were normal in appearance [Normal Oropharynx] : the oropharynx was normal [No JVD] : no jugular venous distention [No Lymphadenopathy] : no lymphadenopathy [Supple] : supple [Thyroid Normal, No Nodules] : the thyroid was normal and there were no nodules present [No Respiratory Distress] : no respiratory distress  [No Accessory Muscle Use] : no accessory muscle use [Clear to Auscultation] : lungs were clear to auscultation bilaterally [Normal Rate] : normal rate  [Regular Rhythm] : with a regular rhythm [Normal S1, S2] : normal S1 and S2 [No Murmur] : no murmur heard [No Carotid Bruits] : no carotid bruits [No Abdominal Bruit] : a ~M bruit was not heard ~T in the abdomen [No Varicosities] : no varicosities [Pedal Pulses Present] : the pedal pulses are present [No Edema] : there was no peripheral edema [No Palpable Aorta] : no palpable aorta [No Extremity Clubbing/Cyanosis] : no extremity clubbing/cyanosis [Soft] : abdomen soft [Non Tender] : non-tender [Non-distended] : non-distended [No Masses] : no abdominal mass palpated [No HSM] : no HSM [Normal Bowel Sounds] : normal bowel sounds [Normal Posterior Cervical Nodes] : no posterior cervical lymphadenopathy [Normal Anterior Cervical Nodes] : no anterior cervical lymphadenopathy [No CVA Tenderness] : no CVA  tenderness [No Spinal Tenderness] : no spinal tenderness [No Joint Swelling] : no joint swelling [Grossly Normal Strength/Tone] : grossly normal strength/tone [No Rash] : no rash [Coordination Grossly Intact] : coordination grossly intact [No Focal Deficits] : no focal deficits [Normal Gait] : normal gait [Deep Tendon Reflexes (DTR)] : deep tendon reflexes were 2+ and symmetric [Normal Affect] : the affect was normal [Normal Insight/Judgement] : insight and judgment were intact [de-identified] : obese

## 2021-03-10 NOTE — ADDENDUM
[FreeTextEntry1] : I, Kevin Castaneda, acted solely as scribe for Dr. Edu Boston DO on this date 03/10/2021  9:00AM .\par \par All medical record entries made by the Scribe were at my, Dr. Edu Boston DO direction and personally dictated by me on 03/10/2021  9:00AM. I have reviewed the chart and agree that the record accurately reflects my personal performance of the history, physical exam, assessment and plan. I have also personally directed, reviewed and agreed with the chart.\par

## 2021-03-10 NOTE — ASSESSMENT
[FreeTextEntry1] : Patient is a 79 year old female with a past medical history as above who presents for fasting blood work, Rx refill, and general follow-up.

## 2021-03-10 NOTE — HEALTH RISK ASSESSMENT
[No] : In the past 12 months have you used drugs other than those required for medical reasons? No [1] : 2) Feeling down, depressed, or hopeless for several days (1) [Fully functional (bathing, dressing, toileting, transferring, walking, feeding)] : Fully functional (bathing, dressing, toileting, transferring, walking, feeding) [Fully functional (using the telephone, shopping, preparing meals, housekeeping, doing laundry, using] : Fully functional and needs no help or supervision to perform IADLs (using the telephone, shopping, preparing meals, housekeeping, doing laundry, using transportation, managing medications and managing finances) [] : No [Audit-CScore] : 0 [[id number]] : 2 [MammogramComments] : Rx given for mammogram and breast US. [BoneDensityDate] : 05/12 [BoneDensityComments] : Osteopenia.  [ColonoscopyComments] : Follow up with Dr. Sands to further discuss repeat screening.

## 2021-03-10 NOTE — PLAN
[FreeTextEntry1] : Constitutional\par chills - referred to ID specialist, Dr. Vanessa Onofre; recommended following up with Dr. Onofre after completing all age-appropriate screenings as mentioned below   \par Gynecology\par Referred to GYN, Dr. Delcid for annual visit - Rx given for mammogram and breast US\par Gastroenterology\par colonoscopy - follow up with gastroenterologist, Dr. Sands to further discuss repeat screening colonoscopy\par GERD - continue Omeprazole 20mg p.o.q.d. - continue antireflux measures\par Cardiology\par hypertension - continue Losartan Potassium 50mg BID p.o.q.d., Hydralazine HCl 25mg TID p.o.q.d., and Amlodipine Besylate 10mg p.o.q.d. - advised low sodium diet and weight loss \par hyperlipidemia - continue Rosuvastatin Calcium 40mg p.o.q.d. - advised low cholesterol/low fat diet - check FLP and LFTs\par continue Aspirin 325mg p.o.q.d. and Rosuvastatin Calcium 40mg p.o.q.d. given history of ASCAD - continue to follow up with cardiologist, Dr. Johnson\par Endocrinology\par pre-diabetes (hyperglycemia) - continue Metformin HCl ER 500mg BID p.o.q.d. as directed - advised low carbohydrate/low sugar diet - check hemoglobin A1C \par vitamin D deficiency - continue vitamin D 1000 IU p.o.q.d with meals as directed - check vitamin D  \par Psychiatry\par history of anxiety - continue Sertraline HCl 25mg p.o.q.d., Rx filled - continue to follow up with psychiatrist, Dr. Thibodeaux\par \par check female panel and hemoglobin A1C

## 2021-03-10 NOTE — HISTORY OF PRESENT ILLNESS
[FreeTextEntry1] : fasting blood work, Rx refill, and general follow-up  [de-identified] : Patient is a 79 year old female with a past medical history as below who presents for fasting blood work, Rx refill, and general follow-up. Patient states she is taking all medications as prescribed and denies any adverse reactions or side effects. She denies lightheadedness or dizziness on Losartan Potassium, Hydralazine, and Amlodipine Besylate. She denies diffuse arthralgias or myalgias on Rosuvastatin Calcium. GERD is well-managed on Omeprazole. Patient is unsure of the date of her last screening colonoscopy. Patient has not seen a GYN in the past year. She has not had breast imaging in the past year. Patient continues to follow up with cardiologist, Dr. Johnson given history of HTN, HLD, and ASCAD. Patient continues to follow up with psychiatrist, Dr. Thibodeaux given history of anxiety. Patient continues to note chills (started several months ago). She denies any exacerbating/alleviating factors. She denies fever. She inquires about a referral to an infectious disease specialist. Patient is not interested in receiving the COVID-19 Vaccine secondary to an adverse reaction to a vaccine in the past. Patient requests Rx refill for Sertraline.

## 2021-03-18 LAB
25(OH)D3 SERPL-MCNC: 58.2 NG/ML
ALBUMIN SERPL ELPH-MCNC: 4.1 G/DL
ALP BLD-CCNC: 106 U/L
ALT SERPL-CCNC: 13 U/L
ANION GAP SERPL CALC-SCNC: 13 MMOL/L
AST SERPL-CCNC: 16 U/L
BASOPHILS # BLD AUTO: 0.07 K/UL
BASOPHILS NFR BLD AUTO: 0.9 %
BILIRUB SERPL-MCNC: 0.2 MG/DL
BUN SERPL-MCNC: 20 MG/DL
CALCIUM SERPL-MCNC: 9.7 MG/DL
CHLORIDE SERPL-SCNC: 103 MMOL/L
CHOLEST SERPL-MCNC: 173 MG/DL
CO2 SERPL-SCNC: 23 MMOL/L
CREAT SERPL-MCNC: 0.88 MG/DL
EOSINOPHIL # BLD AUTO: 0.2 K/UL
EOSINOPHIL NFR BLD AUTO: 2.7 %
ESTIMATED AVERAGE GLUCOSE: 128 MG/DL
GLUCOSE SERPL-MCNC: 114 MG/DL
HBA1C MFR BLD HPLC: 6.1 %
HCT VFR BLD CALC: 42.8 %
HDLC SERPL-MCNC: 49 MG/DL
HGB BLD-MCNC: 13.3 G/DL
IMM GRANULOCYTES NFR BLD AUTO: 0.4 %
LDLC SERPL CALC-MCNC: 100 MG/DL
LYMPHOCYTES # BLD AUTO: 1.7 K/UL
LYMPHOCYTES NFR BLD AUTO: 23 %
MAN DIFF?: NORMAL
MCHC RBC-ENTMCNC: 29.4 PG
MCHC RBC-ENTMCNC: 31.1 GM/DL
MCV RBC AUTO: 94.7 FL
MONOCYTES # BLD AUTO: 0.64 K/UL
MONOCYTES NFR BLD AUTO: 8.6 %
NEUTROPHILS # BLD AUTO: 4.76 K/UL
NEUTROPHILS NFR BLD AUTO: 64.4 %
NONHDLC SERPL-MCNC: 124 MG/DL
PLATELET # BLD AUTO: 423 K/UL
POTASSIUM SERPL-SCNC: 4.3 MMOL/L
PROT SERPL-MCNC: 6.9 G/DL
RBC # BLD: 4.52 M/UL
RBC # FLD: 13.8 %
SODIUM SERPL-SCNC: 139 MMOL/L
TRIGL SERPL-MCNC: 118 MG/DL
TSH SERPL-ACNC: 1.99 UIU/ML
WBC # FLD AUTO: 7.4 K/UL

## 2021-03-27 DIAGNOSIS — Z20.822 CONTACT WITH AND (SUSPECTED) EXPOSURE TO COVID-19: ICD-10-CM

## 2021-03-27 DIAGNOSIS — Z87.898 PERSONAL HISTORY OF OTHER SPECIFIED CONDITIONS: ICD-10-CM

## 2021-03-27 DIAGNOSIS — R82.90 UNSPECIFIED ABNORMAL FINDINGS IN URINE: ICD-10-CM

## 2021-03-27 DIAGNOSIS — Z86.39 PERSONAL HISTORY OF OTHER ENDOCRINE, NUTRITIONAL AND METABOLIC DISEASE: ICD-10-CM

## 2021-03-27 DIAGNOSIS — Z87.19 PERSONAL HISTORY OF OTHER DISEASES OF THE DIGESTIVE SYSTEM: ICD-10-CM

## 2021-03-27 DIAGNOSIS — Z87.09 PERSONAL HISTORY OF OTHER DISEASES OF THE RESPIRATORY SYSTEM: ICD-10-CM

## 2021-03-28 ENCOUNTER — OUTPATIENT (OUTPATIENT)
Dept: OUTPATIENT SERVICES | Facility: HOSPITAL | Age: 81
LOS: 1 days | Discharge: HOME | End: 2021-03-28

## 2021-03-28 DIAGNOSIS — Z98.890 OTHER SPECIFIED POSTPROCEDURAL STATES: Chronic | ICD-10-CM

## 2021-03-28 DIAGNOSIS — Z98.89 OTHER SPECIFIED POSTPROCEDURAL STATES: Chronic | ICD-10-CM

## 2021-03-28 DIAGNOSIS — D47.3 ESSENTIAL (HEMORRHAGIC) THROMBOCYTHEMIA: ICD-10-CM

## 2021-03-30 ENCOUNTER — RESULT REVIEW (OUTPATIENT)
Age: 81
End: 2021-03-30

## 2021-03-30 ENCOUNTER — OUTPATIENT (OUTPATIENT)
Dept: OUTPATIENT SERVICES | Facility: HOSPITAL | Age: 81
LOS: 1 days | End: 2021-03-30
Payer: MEDICARE

## 2021-03-30 ENCOUNTER — APPOINTMENT (OUTPATIENT)
Age: 81
End: 2021-03-30
Payer: MEDICARE

## 2021-03-30 VITALS
SYSTOLIC BLOOD PRESSURE: 159 MMHG | WEIGHT: 160 LBS | TEMPERATURE: 97.2 F | HEIGHT: 61 IN | RESPIRATION RATE: 16 BRPM | DIASTOLIC BLOOD PRESSURE: 75 MMHG | BODY MASS INDEX: 30.21 KG/M2 | HEART RATE: 60 BPM

## 2021-03-30 DIAGNOSIS — D47.3 ESSENTIAL (HEMORRHAGIC) THROMBOCYTHEMIA: ICD-10-CM

## 2021-03-30 DIAGNOSIS — Z98.89 OTHER SPECIFIED POSTPROCEDURAL STATES: Chronic | ICD-10-CM

## 2021-03-30 DIAGNOSIS — Z98.890 OTHER SPECIFIED POSTPROCEDURAL STATES: Chronic | ICD-10-CM

## 2021-03-30 LAB
B2 MICROGLOB SERPL-MCNC: 3.2 MG/L — HIGH (ref 0.8–2.2)
BASOPHILS # BLD AUTO: 0.07 K/UL — SIGNIFICANT CHANGE UP (ref 0–0.2)
BASOPHILS NFR BLD AUTO: 0.8 % — SIGNIFICANT CHANGE UP (ref 0–2)
EOSINOPHIL # BLD AUTO: 0.16 K/UL — SIGNIFICANT CHANGE UP (ref 0–0.5)
EOSINOPHIL NFR BLD AUTO: 1.8 % — SIGNIFICANT CHANGE UP (ref 0–6)
ERYTHROCYTE [SEDIMENTATION RATE] IN BLOOD: 30 MM/HR — HIGH (ref 0–20)
HCT VFR BLD CALC: 40.6 % — SIGNIFICANT CHANGE UP (ref 34.5–45)
HGB BLD-MCNC: 13.3 G/DL — SIGNIFICANT CHANGE UP (ref 11.5–15.5)
IMM GRANULOCYTES NFR BLD AUTO: 0.5 % — SIGNIFICANT CHANGE UP (ref 0–1.5)
LDH SERPL L TO P-CCNC: 172 U/L — SIGNIFICANT CHANGE UP (ref 50–242)
LYMPHOCYTES # BLD AUTO: 1.95 K/UL — SIGNIFICANT CHANGE UP (ref 1–3.3)
LYMPHOCYTES # BLD AUTO: 22.4 % — SIGNIFICANT CHANGE UP (ref 13–44)
MCHC RBC-ENTMCNC: 30 PG — SIGNIFICANT CHANGE UP (ref 27–34)
MCHC RBC-ENTMCNC: 32.8 GM/DL — SIGNIFICANT CHANGE UP (ref 32–36)
MCV RBC AUTO: 91.4 FL — SIGNIFICANT CHANGE UP (ref 80–100)
MONOCYTES # BLD AUTO: 0.82 K/UL — SIGNIFICANT CHANGE UP (ref 0–0.9)
MONOCYTES NFR BLD AUTO: 9.4 % — SIGNIFICANT CHANGE UP (ref 2–14)
NEUTROPHILS # BLD AUTO: 5.67 K/UL — SIGNIFICANT CHANGE UP (ref 1.8–7.4)
NEUTROPHILS NFR BLD AUTO: 65.1 % — SIGNIFICANT CHANGE UP (ref 43–77)
NRBC # BLD: 0 /100 WBCS — SIGNIFICANT CHANGE UP (ref 0–0)
PLATELET # BLD AUTO: 426 K/UL — HIGH (ref 150–400)
RBC # BLD: 4.44 M/UL — SIGNIFICANT CHANGE UP (ref 3.8–5.2)
RBC # FLD: 13.7 % — SIGNIFICANT CHANGE UP (ref 10.3–14.5)
WBC # BLD: 8.71 K/UL — SIGNIFICANT CHANGE UP (ref 3.8–10.5)
WBC # FLD AUTO: 8.71 K/UL — SIGNIFICANT CHANGE UP (ref 3.8–10.5)

## 2021-03-30 PROCEDURE — 81270 JAK2 GENE: CPT

## 2021-03-30 PROCEDURE — 99204 OFFICE O/P NEW MOD 45 MIN: CPT

## 2021-03-30 PROCEDURE — 81206 BCR/ABL1 GENE MAJOR BP: CPT

## 2021-03-30 PROCEDURE — G0452: CPT | Mod: 26

## 2021-03-30 PROCEDURE — 81207 BCR/ABL1 GENE MINOR BP: CPT

## 2021-03-30 NOTE — HISTORY OF PRESENT ILLNESS
[de-identified] : Ms. KERR is referred for hematologic consultation of thrombocytosis. She is a 80F discovered to have a platelet count of 423,000 during routine evaluation . Patient's medical history is notable for HTN, HLD, esophageal stricture, bronchitis, atherosclerotic heart disease, anxiety/ depression. She reports occasional chills, but denies recent infections, symptoms of fatigue, headache, lightheadedness, visual changes, fever, erythromelalgia. A review of previous laboratory reports obtained from the past 2 years reveal a platelet count fluctuant between 420 and 480K; WBC and hemoglobin in normal range.  Denies personal history of venous thrombosis; her mother  at age 52 after a hemorrhagic stroke (being on Coumadin).  Platelet count today at 423,000. [FreeTextEntry1] : expectant surveillance\par \par

## 2021-03-30 NOTE — ASSESSMENT
[FreeTextEntry1] : Ms. KERR 's questions were answered to her satisfaction. She  expressed her  understanding and willingness to comply with the above recommendations, and  will return to the office in 3 months.\par \par \par \par \par \par \par \par

## 2021-03-30 NOTE — CONSULT LETTER
[Dear  ___] : Dear  [unfilled], [Consult Letter:] : I had the pleasure of evaluating your patient, [unfilled]. [Please see my note below.] : Please see my note below. [Consult Closing:] : Thank you very much for allowing me to participate in the care of this patient.  If you have any questions, please do not hesitate to contact me. [Sincerely,] : Sincerely, [FreeTextEntry2] : Edu Boston D.O. [FreeTextEntry3] : KIMANI CUI M.D.\par Hematology/ Oncology\par [facility]\par [facility]\par [address] [place] [address], [facility]\par [place] [address]\par Telephone: [phone]\par FAX: [phone]\par \par \par

## 2021-03-30 NOTE — REVIEW OF SYSTEMS
[Patient Intake Form Reviewed] : Patient intake form was reviewed [Negative] : Heme/Lymph [Anxiety] : anxiety [Depression] : depression [Fatigue] : no fatigue [Night Sweats] : no night sweats [Recent Change In Weight] : ~T no recent weight change [de-identified] : under the care of psychiatrist for depression

## 2021-04-01 LAB — JAK2 P.V617F BLD/T QL: SIGNIFICANT CHANGE UP

## 2021-04-02 LAB — BCR/ABL BY RT - PCR QUANTITATIVE: SIGNIFICANT CHANGE UP

## 2021-04-13 ENCOUNTER — NON-APPOINTMENT (OUTPATIENT)
Age: 81
End: 2021-04-13

## 2021-04-13 ENCOUNTER — APPOINTMENT (OUTPATIENT)
Dept: CARDIOLOGY | Facility: CLINIC | Age: 81
End: 2021-04-13
Payer: MEDICARE

## 2021-04-13 VITALS
WEIGHT: 161 LBS | BODY MASS INDEX: 30.4 KG/M2 | SYSTOLIC BLOOD PRESSURE: 174 MMHG | OXYGEN SATURATION: 98 % | HEIGHT: 61 IN | DIASTOLIC BLOOD PRESSURE: 74 MMHG | HEART RATE: 64 BPM

## 2021-04-13 VITALS — SYSTOLIC BLOOD PRESSURE: 142 MMHG | DIASTOLIC BLOOD PRESSURE: 70 MMHG

## 2021-04-13 PROCEDURE — 93000 ELECTROCARDIOGRAM COMPLETE: CPT

## 2021-04-13 PROCEDURE — 99214 OFFICE O/P EST MOD 30 MIN: CPT

## 2021-04-13 NOTE — HISTORY OF PRESENT ILLNESS
[FreeTextEntry1] : 80-year-old woman with a history of anxiety, hypertension, hyperlipidemia.\par \par She had presented to [facility] with a pneumonia. During her hospitalization she was found to have lateral T wave inversions on EKG which were worse than previously seen. She had echocardiogram that showed normal left ventricular function. Her cardiac enzymes were negative. she had a CAT scan of the chest which was also revealing for coronary artery calcifications and calcifications to the aorta and its main branches. she had nuclear stress test which was unremarkable.\par She had a syncopal episode when she was on Chlorthalidone. She was taken to [facility] in early 2019 and her diuretic was stopped.  \par \par She had an echo in 9/2019 that showed normal systolic LV function without any significant other findings, including no significant valvular disease. \par \par She now presents for a followup visit.\par Since her last visit, she recently was found to have thrombocytosis and needs BM biopsy by heme.\par She is still having chills, fatigue. Her palpitations have resolved. \par She has been trying to stay active. She is helping many friends with their medical issues. She still has not gotten vaccinated against COVID because of previous reactions to vaccines. \par \par She is still complaining of ENNIS with stairs that is unchanged from previous. She denies any anginal symptoms. She denies any  PND, orthopnea,  edema,  syncope, stroke like symptoms. She denies any blurry vision, headaches or recent stroke like symptoms. she's been compliant with her medications.

## 2021-04-13 NOTE — PHYSICAL EXAM
[Well Groomed] : well groomed [General Appearance - In No Acute Distress] : no acute distress [Normal Conjunctiva] : the conjunctiva exhibited no abnormalities [Eyelids - No Xanthelasma] : the eyelids demonstrated no xanthelasmas [Normal Oral Mucosa] : normal oral mucosa [No Oral Pallor] : no oral pallor [No Oral Cyanosis] : no oral cyanosis [Normal Jugular Venous A Waves Present] : normal jugular venous A waves present [Normal Jugular Venous V Waves Present] : normal jugular venous V waves present [No Jugular Venous Costello A Waves] : no jugular venous costello A waves [Respiration, Rhythm And Depth] : normal respiratory rhythm and effort [Exaggerated Use Of Accessory Muscles For Inspiration] : no accessory muscle use [Auscultation Breath Sounds / Voice Sounds] : lungs were clear to auscultation bilaterally [Abdomen Tenderness] : non-tender [Abdomen Soft] : soft [Abdomen Mass (___ Cm)] : no abdominal mass palpated [Abnormal Walk] : normal gait [Gait - Sufficient For Exercise Testing] : the gait was sufficient for exercise testing [Nail Clubbing] : no clubbing of the fingernails [Cyanosis, Localized] : no localized cyanosis [Petechial Hemorrhages (___cm)] : no petechial hemorrhages [Skin Color & Pigmentation] : normal skin color and pigmentation [] : no rash [No Venous Stasis] : no venous stasis [Skin Lesions] : no skin lesions [No Skin Ulcers] : no skin ulcer [No Xanthoma] : no  xanthoma was observed [Oriented To Time, Place, And Person] : oriented to person, place, and time [Affect] : the affect was normal [Mood] : the mood was normal [No Anxiety] : not feeling anxious [Normal Rate] : normal [Rhythm Regular] : regular [Normal S1] : normal S1 [Normal S2] : normal S2 [No Gallop] : no gallop heard [II] : a grade 2 [I] : a grade 1 [1+] : left 1+ [2+] : left 2+ [No Pitting Edema] : no pitting edema present [FreeTextEntry1] : + tenderness in left flank [Right Carotid Bruit] : no bruit heard over the right carotid [Left Carotid Bruit] : no bruit heard over the left carotid [Bruit] : no bruit heard

## 2021-04-13 NOTE — REVIEW OF SYSTEMS
[Dyspnea on exertion] : dyspnea during exertion [Negative] : Heme/Lymph [Chills] : chills [Feeling Fatigued] : feeling fatigued [Chest Pain] : no chest pain

## 2021-04-13 NOTE — DISCUSSION/SUMMARY
[FreeTextEntry1] : 80-year-old woman with a history as listed above who presents today for a followup cardiac evaluation.\par \par Yara is  relatively stable from a CV pOV. She denies any anginal symptoms. Clinically she is euvolemic on exam. Her EKG is unchanged from previous.  She had an nuclear stress test unrevealing for ischemia on 7/2020. She had an echo in 7/29/20 that showed normal systolic LV function without any significant other findings, including no significant valvular disease. \par \par Her heart rate is much better off of the Coreg. Her BP is elevated today again.  She will continue Losartan 50mg q12, Norvasc  10 mg daily, I will increase her hydralazine to 50mg q8. She does not want to try any type of diuretic.  She understands and want to continue to try lifestyle modification.  \par \par She has coronary calcifications. Her goal LDL should be less than 100.  She will continue  Crestor 40mg HS. \par \par She will followup with heme for her thrombocytosis. She will continue ASA for now. \par \par  Exercise and diet counseling was performed in order to reduce her future cardiovascular risk. \par She will followup with me in 3 months time or sooner if necessary.

## 2021-04-28 ENCOUNTER — OUTPATIENT (OUTPATIENT)
Dept: OUTPATIENT SERVICES | Facility: HOSPITAL | Age: 81
LOS: 1 days | Discharge: HOME | End: 2021-04-28

## 2021-04-28 DIAGNOSIS — Z98.89 OTHER SPECIFIED POSTPROCEDURAL STATES: Chronic | ICD-10-CM

## 2021-04-28 DIAGNOSIS — D47.3 ESSENTIAL (HEMORRHAGIC) THROMBOCYTHEMIA: ICD-10-CM

## 2021-04-28 DIAGNOSIS — Z98.890 OTHER SPECIFIED POSTPROCEDURAL STATES: Chronic | ICD-10-CM

## 2021-04-29 ENCOUNTER — RESULT REVIEW (OUTPATIENT)
Age: 81
End: 2021-04-29

## 2021-04-29 ENCOUNTER — LABORATORY RESULT (OUTPATIENT)
Age: 81
End: 2021-04-29

## 2021-04-29 ENCOUNTER — APPOINTMENT (OUTPATIENT)
Age: 81
End: 2021-04-29
Payer: MEDICARE

## 2021-04-29 VITALS
WEIGHT: 160.93 LBS | HEART RATE: 68 BPM | SYSTOLIC BLOOD PRESSURE: 152 MMHG | TEMPERATURE: 97.2 F | DIASTOLIC BLOOD PRESSURE: 76 MMHG | BODY MASS INDEX: 31.6 KG/M2 | RESPIRATION RATE: 16 BRPM | HEIGHT: 59.92 IN | OXYGEN SATURATION: 99 %

## 2021-04-29 LAB
BASOPHILS # BLD AUTO: 0.1 K/UL — SIGNIFICANT CHANGE UP (ref 0–0.2)
BASOPHILS NFR BLD AUTO: 0.9 % — SIGNIFICANT CHANGE UP (ref 0–2)
EOSINOPHIL # BLD AUTO: 0.19 K/UL — SIGNIFICANT CHANGE UP (ref 0–0.5)
EOSINOPHIL NFR BLD AUTO: 1.8 % — SIGNIFICANT CHANGE UP (ref 0–6)
HCT VFR BLD CALC: 40.3 % — SIGNIFICANT CHANGE UP (ref 34.5–45)
HGB BLD-MCNC: 13.4 G/DL — SIGNIFICANT CHANGE UP (ref 11.5–15.5)
IMM GRANULOCYTES NFR BLD AUTO: 0.7 % — SIGNIFICANT CHANGE UP (ref 0–1.5)
LYMPHOCYTES # BLD AUTO: 2.32 K/UL — SIGNIFICANT CHANGE UP (ref 1–3.3)
LYMPHOCYTES # BLD AUTO: 21.7 % — SIGNIFICANT CHANGE UP (ref 13–44)
MCHC RBC-ENTMCNC: 29.7 PG — SIGNIFICANT CHANGE UP (ref 27–34)
MCHC RBC-ENTMCNC: 33.3 G/DL — SIGNIFICANT CHANGE UP (ref 32–36)
MCV RBC AUTO: 89.4 FL — SIGNIFICANT CHANGE UP (ref 80–100)
MONOCYTES # BLD AUTO: 0.96 K/UL — HIGH (ref 0–0.9)
MONOCYTES NFR BLD AUTO: 9 % — SIGNIFICANT CHANGE UP (ref 2–14)
NEUTROPHILS # BLD AUTO: 7.02 K/UL — SIGNIFICANT CHANGE UP (ref 1.8–7.4)
NEUTROPHILS NFR BLD AUTO: 65.9 % — SIGNIFICANT CHANGE UP (ref 43–77)
NRBC # BLD: 0 /100 WBCS — SIGNIFICANT CHANGE UP (ref 0–0)
PLATELET # BLD AUTO: 482 K/UL — HIGH (ref 150–400)
RBC # BLD: 4.51 M/UL — SIGNIFICANT CHANGE UP (ref 3.8–5.2)
RBC # FLD: 13.6 % — SIGNIFICANT CHANGE UP (ref 10.3–14.5)
WBC # BLD: 10.67 K/UL — HIGH (ref 3.8–10.5)
WBC # FLD AUTO: 10.67 K/UL — HIGH (ref 3.8–10.5)

## 2021-04-29 PROCEDURE — 38222 DX BONE MARROW BX & ASPIR: CPT | Mod: RT

## 2021-04-29 NOTE — DISCUSSION/SUMMARY
[FreeTextEntry1] : JAK2 positive (8.9% peripheral blood cells) thrombocytosis–for bone marrow biopsy/aspirate today.

## 2021-04-29 NOTE — REASON FOR VISIT
[Bone Marrow Biopsy] : bone marrow biopsy [Bone Marrow Aspiration] : bone marrow aspiration [FreeTextEntry2] : Thrombocytosis

## 2021-04-29 NOTE — PROCEDURE
[Bone Marrow Biopsy] : bone marrow biopsy [Bone Marrow Aspiration] : bone marrow aspiration  [Patient] : the patient [Verbal Consent Obtained] : verbal consent was obtained prior to the procedure [Patient identification verified] : patient identification verified [Procedure verified and consent obtained] : procedure verified and consent obtained [Laterality verified and correct site marked] : laterality verified and correct site marked [Correct positioning] : correct positioning [Other ___] : [unfilled] [Superior iliac spine was identified] : the superior iliac spine was identified. [Lidocaine was injected and into the periosteum overlying the site.] : Lidocaine was injected and into the periosteum overlying the site. [Aspirate] : aspirate [Cytogenetics] : cytogenetics [FISH] : FISH [Biopsy] : biopsy [Flow Cytometry] : flow cytometry [] : The patient was instructed to remove the bandage the following AM. The patient may bathe. Acetaminophen may be taken for discomfort, as per package directions.If there are any other problems, the patient was instructed to call the office. The patient verbalized understanding, and is aware of the office contact numbers. [Left lateral decibitus position] : left lateral decibitus position [The right posterior iliac crest was prepped with betadine and draped, using sterile technique.] : The right posterior iliac crest was prepped with betadine and draped, using sterile technique. [FreeTextEntry1] : Thrombocytosis

## 2021-06-08 ENCOUNTER — APPOINTMENT (OUTPATIENT)
Dept: INTERNAL MEDICINE | Facility: CLINIC | Age: 81
End: 2021-06-08
Payer: MEDICARE

## 2021-06-08 VITALS
TEMPERATURE: 97.8 F | SYSTOLIC BLOOD PRESSURE: 136 MMHG | HEART RATE: 80 BPM | WEIGHT: 161 LBS | DIASTOLIC BLOOD PRESSURE: 68 MMHG | OXYGEN SATURATION: 97 % | RESPIRATION RATE: 16 BRPM | HEIGHT: 59 IN | BODY MASS INDEX: 32.46 KG/M2

## 2021-06-08 PROCEDURE — 99214 OFFICE O/P EST MOD 30 MIN: CPT | Mod: 25

## 2021-06-08 PROCEDURE — 36415 COLL VENOUS BLD VENIPUNCTURE: CPT

## 2021-06-08 NOTE — HISTORY OF PRESENT ILLNESS
[FreeTextEntry1] : fasting blood work and general follow-up  [de-identified] : Patient is a 79 year old female with a past medical history as below who presents for fasting blood work and general follow-up. Patient states she is taking all medications as prescribed. She denies lightheadedness or dizziness on Losartan Potassium, Hydralazine, and Amlodipine Besylate. She denies diffuse arthralgias or myalgias on Rosuvastatin Calcium. GERD is well-managed on Omeprazole. Patient has been taking Kaopectate for intermittent episodes of diarrhea. She also occasionally notes nausea after eating. Patient continues to follow up with cardiologist, Dr. Johnson given history of HTN, HLD, and ASCAD. Hydralazine dosage was increased from 25mg TID to 50mg TID by Dr. Johnson. Patient saw hematologist, Dr. Harden for further evaluation of persistent thrombocytosis. Bone marrow aspirate dated 4/29/21 demonstrated the following: myeloid immunophenotypic findings show normal myeloid granularity, no increase in myeloid immaturity, and normal myeloid antigen maturation pattern; lymphocyte immunophenotypic findings show no diagnostic abnormalities. Patient continues to follow up with psychiatrist, Dr. Thibodeaux given history of anxiety. Patient states she has not been maintaining a well-balanced diet. Patient notes fatigue. She continues to note chills. Patient is not interested in receiving the COVID-19 Vaccine secondary to an adverse reaction to a vaccine in the past.

## 2021-06-08 NOTE — REVIEW OF SYSTEMS
[Chills] : chills [Fatigue] : fatigue [Nausea] : nausea [Diarrhea] : diarrhea [Negative] : Heme/Lymph

## 2021-06-08 NOTE — ADDENDUM
[FreeTextEntry1] : I, Kevin Castaneda, acted solely as scribe for Dr. Edu Boston DO on this date 06/08/2021 10:00AM .\par \par All medical record entries made by the Scribe were at my, Dr. Edu Boston DO direction and personally dictated by me on 06/08/2021 10:00AM. I have reviewed the chart and agree that the record accurately reflects my personal performance of the history, physical exam, assessment and plan. I have also personally directed, reviewed and agreed with the chart.\par

## 2021-06-08 NOTE — PHYSICAL EXAM
[No Acute Distress] : no acute distress [Well Nourished] : well nourished [Well Developed] : well developed [Well-Appearing] : well-appearing [Normal Sclera/Conjunctiva] : normal sclera/conjunctiva [PERRL] : pupils equal round and reactive to light [EOMI] : extraocular movements intact [Normal Outer Ear/Nose] : the outer ears and nose were normal in appearance [Normal Oropharynx] : the oropharynx was normal [No JVD] : no jugular venous distention [No Lymphadenopathy] : no lymphadenopathy [Supple] : supple [Thyroid Normal, No Nodules] : the thyroid was normal and there were no nodules present [No Respiratory Distress] : no respiratory distress  [No Accessory Muscle Use] : no accessory muscle use [Clear to Auscultation] : lungs were clear to auscultation bilaterally [Normal Rate] : normal rate  [Regular Rhythm] : with a regular rhythm [Normal S1, S2] : normal S1 and S2 [No Murmur] : no murmur heard [No Carotid Bruits] : no carotid bruits [No Abdominal Bruit] : a ~M bruit was not heard ~T in the abdomen [No Varicosities] : no varicosities [Pedal Pulses Present] : the pedal pulses are present [No Edema] : there was no peripheral edema [No Palpable Aorta] : no palpable aorta [No Extremity Clubbing/Cyanosis] : no extremity clubbing/cyanosis [Soft] : abdomen soft [Non Tender] : non-tender [Non-distended] : non-distended [No Masses] : no abdominal mass palpated [No HSM] : no HSM [Normal Bowel Sounds] : normal bowel sounds [Normal Posterior Cervical Nodes] : no posterior cervical lymphadenopathy [Normal Anterior Cervical Nodes] : no anterior cervical lymphadenopathy [No CVA Tenderness] : no CVA  tenderness [No Spinal Tenderness] : no spinal tenderness [No Joint Swelling] : no joint swelling [Grossly Normal Strength/Tone] : grossly normal strength/tone [No Rash] : no rash [Coordination Grossly Intact] : coordination grossly intact [No Focal Deficits] : no focal deficits [Normal Gait] : normal gait [Deep Tendon Reflexes (DTR)] : deep tendon reflexes were 2+ and symmetric [Normal Affect] : the affect was normal [Normal Insight/Judgement] : insight and judgment were intact [de-identified] : obese

## 2021-06-08 NOTE — PLAN
[FreeTextEntry1] : Constitutional\par chills - previously referred to ID specialist, Dr. Vanessa Onofre; recommended following up with Dr. Onofre after completing all age-appropriate screenings as mentioned below \par Gynecology\par Referred to GYN, Dr. Delcid for annual visit - Rx previously given for Mammogram and Breast US\par Gastroenterology\par colonoscopy - advised to follow up with gastroenterologist, Dr. Sands to further discuss repeat screening\par GERD - continue Omeprazole 20mg p.o.q.d. - continue antireflux measures\par diarrhea - possibly secondary to increase in Metformin dose - recommended starting fiber supplement (Benefiber or Citrucel) p.o.q.d. - advised to follow up if diarrhea persists/worsens \par Cardiology\par hypertension - continue Losartan Potassium 50mg BID p.o.q.d., Hydralazine HCl 50mg TID p.o.q.d., and Amlodipine Besylate 10mg p.o.q.d. as directed - advised low sodium diet and weight loss; will continue to monitor BP\par hyperlipidemia - continue Rosuvastatin Calcium 40mg p.o.q.d. - advised low cholesterol/low fat diet - check FLP and LFTs\par continue Aspirin 325mg p.o.q.d. and Rosuvastatin Calcium 40mg p.o.q.d. given history of ASCAD - continue to follow up with cardiologist, Dr. Johnson\par Endocrinology\par pre-diabetes (hyperglycemia) - continue Metformin HCl ER 500mg 2 tablets BID p.o.q.d. as directed - advised low carbohydrate/low sugar diet - check hemoglobin A1C \par Vitamin D deficiency - continue Vitamin D 1000 IU p.o.q.d with meals as directed - check Vitamin D  \par Psychiatry\par history of anxiety - continue Sertraline HCl 25mg p.o.q.d. - continue to follow up with psychiatrist, Dr. Thibodeaux\par \par check female panel and hemoglobin A1C

## 2021-06-08 NOTE — HEALTH RISK ASSESSMENT
[No] : In the past 12 months have you used drugs other than those required for medical reasons? No [1] : 2) Feeling down, depressed, or hopeless for several days (1) [Fully functional (bathing, dressing, toileting, transferring, walking, feeding)] : Fully functional (bathing, dressing, toileting, transferring, walking, feeding) [Fully functional (using the telephone, shopping, preparing meals, housekeeping, doing laundry, using] : Fully functional and needs no help or supervision to perform IADLs (using the telephone, shopping, preparing meals, housekeeping, doing laundry, using transportation, managing medications and managing finances) [] : No [Audit-CScore] : 0 [[id number]] : 2 [MammogramComments] : Rx previously given for mammogram and breast US. [BoneDensityDate] : 05/12 [BoneDensityComments] : Osteopenia.  [ColonoscopyComments] : Previously advised to follow up with Dr. Sands to further discuss repeat screening.

## 2021-06-12 ENCOUNTER — NON-APPOINTMENT (OUTPATIENT)
Age: 81
End: 2021-06-12

## 2021-06-12 LAB
25(OH)D3 SERPL-MCNC: 52.5 NG/ML
ALBUMIN SERPL ELPH-MCNC: 4.1 G/DL
ALP BLD-CCNC: 89 U/L
ALT SERPL-CCNC: 11 U/L
ANION GAP SERPL CALC-SCNC: 14 MMOL/L
AST SERPL-CCNC: 15 U/L
BASOPHILS # BLD AUTO: 0.09 K/UL
BASOPHILS NFR BLD AUTO: 1 %
BILIRUB SERPL-MCNC: 0.3 MG/DL
BUN SERPL-MCNC: 17 MG/DL
CALCIUM SERPL-MCNC: 9.9 MG/DL
CHLORIDE SERPL-SCNC: 103 MMOL/L
CHOLEST SERPL-MCNC: 160 MG/DL
CO2 SERPL-SCNC: 22 MMOL/L
CREAT SERPL-MCNC: 0.92 MG/DL
EOSINOPHIL # BLD AUTO: 0.27 K/UL
EOSINOPHIL NFR BLD AUTO: 2.9 %
ESTIMATED AVERAGE GLUCOSE: 123 MG/DL
GLUCOSE SERPL-MCNC: 116 MG/DL
HBA1C MFR BLD HPLC: 5.9 %
HCT VFR BLD CALC: 40.8 %
HDLC SERPL-MCNC: 43 MG/DL
HGB BLD-MCNC: 12.6 G/DL
IMM GRANULOCYTES NFR BLD AUTO: 0.5 %
LDLC SERPL CALC-MCNC: 86 MG/DL
LYMPHOCYTES # BLD AUTO: 2.08 K/UL
LYMPHOCYTES NFR BLD AUTO: 22.1 %
MAN DIFF?: NORMAL
MCHC RBC-ENTMCNC: 29.6 PG
MCHC RBC-ENTMCNC: 30.9 GM/DL
MCV RBC AUTO: 96 FL
MONOCYTES # BLD AUTO: 0.86 K/UL
MONOCYTES NFR BLD AUTO: 9.1 %
NEUTROPHILS # BLD AUTO: 6.08 K/UL
NEUTROPHILS NFR BLD AUTO: 64.4 %
NONHDLC SERPL-MCNC: 118 MG/DL
PLATELET # BLD AUTO: 449 K/UL
POTASSIUM SERPL-SCNC: 4.4 MMOL/L
PROT SERPL-MCNC: 6.6 G/DL
RBC # BLD: 4.25 M/UL
RBC # FLD: 13.9 %
SODIUM SERPL-SCNC: 139 MMOL/L
TRIGL SERPL-MCNC: 161 MG/DL
TSH SERPL-ACNC: 1.96 UIU/ML
WBC # FLD AUTO: 9.43 K/UL

## 2021-06-19 NOTE — ED PROVIDER NOTE - ENMT, MLM
No Airway patent, Mouth with normal mucosa. Throat has no vesicles, no oropharyngeal exudates and uvula is midline.

## 2021-06-21 ENCOUNTER — APPOINTMENT (OUTPATIENT)
Dept: GASTROENTEROLOGY | Facility: CLINIC | Age: 81
End: 2021-06-21
Payer: MEDICARE

## 2021-06-21 VITALS
WEIGHT: 154 LBS | TEMPERATURE: 97.5 F | BODY MASS INDEX: 31.04 KG/M2 | SYSTOLIC BLOOD PRESSURE: 132 MMHG | OXYGEN SATURATION: 96 % | DIASTOLIC BLOOD PRESSURE: 70 MMHG | HEIGHT: 59 IN | HEART RATE: 58 BPM

## 2021-06-21 DIAGNOSIS — R19.5 OTHER FECAL ABNORMALITIES: ICD-10-CM

## 2021-06-21 DIAGNOSIS — Z12.11 ENCOUNTER FOR SCREENING FOR MALIGNANT NEOPLASM OF COLON: ICD-10-CM

## 2021-06-21 PROCEDURE — 99214 OFFICE O/P EST MOD 30 MIN: CPT

## 2021-06-21 RX ORDER — SODIUM SULFATE, POTASSIUM SULFATE, MAGNESIUM SULFATE 17.5; 3.13; 1.6 G/ML; G/ML; G/ML
17.5-3.13-1.6 SOLUTION, CONCENTRATE ORAL
Qty: 1 | Refills: 0 | Status: ACTIVE | COMMUNITY
Start: 2021-06-21 | End: 1900-01-01

## 2021-06-21 NOTE — HISTORY OF PRESENT ILLNESS
[de-identified] : Dr. Boston takes care of this very pleasant 80-year-old female\par \par Previously seen because of acid reflux and some vague sense of trouble swallowing\par \par She is doing well with that now\par \par She is on omeprazole with control of heartburn and swallowing easily\par \par She sent for screening colonoscopy\par \par She has occasional loose bowel movements\par \par No blood per rectum\par \par No recent change\par \par No recent antibiotic use\par \par No recent travel\par \par No family history of intestinal cancers\par \par She sees cardiologist periodically and saw her cardiologist in April and is doing well without suggestion of further diagnostic testing from the cardiologist\par \par She sees hematologist mild elevated platelet count and had undergone a bone marrow biopsy

## 2021-06-21 NOTE — PHYSICAL EXAM
[General Appearance - Alert] : alert [General Appearance - In No Acute Distress] : in no acute distress [General Appearance - Well Nourished] : well nourished [General Appearance - Well Developed] : well developed [General Appearance - Well-Appearing] : healthy appearing [Sclera] : the sclera and conjunctiva were normal [Neck Appearance] : the appearance of the neck was normal [Neck Cervical Mass (___cm)] : no neck mass was observed [Jugular Venous Distention Increased] : there was no jugular-venous distention [Respiration, Rhythm And Depth] : normal respiratory rhythm and effort [Exaggerated Use Of Accessory Muscles For Inspiration] : no accessory muscle use [Apical Impulse] : the apical impulse was normal [Heart Rate And Rhythm] : heart rate was normal and rhythm regular [Full Pulse] : the pedal pulses are present [Edema] : there was no peripheral edema [Bowel Sounds] : normal bowel sounds [Abdomen Soft] : soft [Abdomen Tenderness] : non-tender [Abdomen Mass (___ Cm)] : no abdominal mass palpated [Cervical Lymph Nodes Enlarged Posterior Bilaterally] : posterior cervical [Cervical Lymph Nodes Enlarged Anterior Bilaterally] : anterior cervical [Supraclavicular Lymph Nodes Enlarged Bilaterally] : supraclavicular [Axillary Lymph Nodes Enlarged Bilaterally] : axillary [Femoral Lymph Nodes Enlarged Bilaterally] : femoral [Inguinal Lymph Nodes Enlarged Bilaterally] : inguinal [Abnormal Walk] : normal gait [Nail Clubbing] : no clubbing  or cyanosis of the fingernails [Musculoskeletal - Swelling] : no joint swelling seen [Motor Tone] : muscle strength and tone were normal [Skin Color & Pigmentation] : normal skin color and pigmentation [Skin Turgor] : normal skin turgor [] : no rash [No Focal Deficits] : no focal deficits [Oriented To Time, Place, And Person] : oriented to person, place, and time [Impaired Insight] : insight and judgment were intact [Affect] : the affect was normal

## 2021-06-21 NOTE — REASON FOR VISIT
[FreeTextEntry1] : Request for colon cancer screening, swallowing seems to be doing well, heartburn controlled well on omeprazole

## 2021-06-21 NOTE — ASSESSMENT
[FreeTextEntry1] : Impression\par \par Intermittent loose nonbloody stools\par \par Request for colon cancer screening\par \par Heartburn resolved with omeprazole\par \par Swallowing much better\par \par Elevated platelet count under the care of hematologist\par \par Followed by cardiology\par \par Impression\par \par Request for colon cancer screening\par \par Suggest\par \par Anesthesia clearance\par \par Cardiology clearance\par \par Agree with colonoscopy\par \par Suprep\par \par The laxative, or its risks benefits and alternatives have been thoroughly reviewed with the patient in great detail. The laxative instructions have been reviewed in great detail with the patient.\par \par Risks/benefits:\par The procedure, the risks and benefits and alternatives have been reviewed in great detail with the patient.  Risks including, but not limited to sedation such as cardiac and pulmonary compromise, the procedure itself such as bleeding requiring hospitalization, transfusion, surgery, temporary or permanent colostomy.  Perforation or puncture of the requiring hospitalization, surgery, temporary colostomy.\par It has been explained to the patient that though colonoscopy is thought to be the best screening exam for colon cancer and polyps, no screening exam can find all colon polyps or cancers.  \par The patient expresses understanding of the procedure and consents to undergoing the procedure.\par

## 2021-07-13 ENCOUNTER — NON-APPOINTMENT (OUTPATIENT)
Age: 81
End: 2021-07-13

## 2021-07-13 ENCOUNTER — APPOINTMENT (OUTPATIENT)
Dept: CARDIOLOGY | Facility: CLINIC | Age: 81
End: 2021-07-13
Payer: MEDICARE

## 2021-07-13 VITALS — SYSTOLIC BLOOD PRESSURE: 128 MMHG | DIASTOLIC BLOOD PRESSURE: 60 MMHG

## 2021-07-13 VITALS
DIASTOLIC BLOOD PRESSURE: 76 MMHG | HEART RATE: 61 BPM | WEIGHT: 154 LBS | BODY MASS INDEX: 31.04 KG/M2 | SYSTOLIC BLOOD PRESSURE: 146 MMHG | HEIGHT: 59 IN | OXYGEN SATURATION: 94 %

## 2021-07-13 PROCEDURE — 93000 ELECTROCARDIOGRAM COMPLETE: CPT

## 2021-07-13 PROCEDURE — 99214 OFFICE O/P EST MOD 30 MIN: CPT

## 2021-07-13 NOTE — PHYSICAL EXAM
[FreeTextEntry1] : + tenderness in left flank [Right Carotid Bruit] : no bruit heard over the right carotid [Left Carotid Bruit] : no bruit heard over the left carotid [Bruit] : no bruit heard

## 2021-07-13 NOTE — DISCUSSION/SUMMARY
[FreeTextEntry1] : 80-year-old woman with a history as listed above who presents today for a followup cardiac evaluation.\par \par Yara is  relatively stable from a CV POV. Her ENNIS is relatively unchanged and likely from deconditioning. She denies any anginal symptoms. Clinically she is euvolemic on exam. Her EKG is unchanged from previous.  She had an nuclear stress test unrevealing for ischemia on 7/2020. She had an echo in 7/29/20 that showed normal systolic LV function without any significant other findings, including no significant valvular disease. \par \par Her heart rate is much better off of the Coreg. Her BP is  controlled.  She will continue Losartan 50mg q12, Norvasc  10 mg daily,  hydralazine 25mg q8. She does not want to try any type of diuretic.  She will continue to try lifestyle modification.  \par \par She has coronary calcifications. Her goal LDL should be less than 100.  She will continue  Crestor 40mg HS. \par \par She will followup with heme for her thrombocytosis. She will continue ASA for now. \par \par She currently has no active cardiac conditions. She may proceed with the planned low risk colonoscopy without any further cardiac workup. Routine hemodynamic monitoring is suggested during the procedure. \par \par  Exercise and diet counseling was performed in order to reduce her future cardiovascular risk. \par She will followup with me in 3 months time or sooner if necessary.

## 2021-07-13 NOTE — HISTORY OF PRESENT ILLNESS
[FreeTextEntry1] : 80-year-old woman with a history of anxiety, hypertension, hyperlipidemia, HFPEF, coronary calcification. \par She now presents for a followup visit.\par Since her last visit,  she has been relatively feeling well. She has been complaining of being more fatigued lately. \par She has been trying to stay active. She is helping many friends with their medical issues.  \par She is still complaining of ENNIS with stairs and with walking longer distances that is unchanged from previous. She denies any anginal symptoms. She denies any  PND, orthopnea,  edema,  syncope, stroke like symptoms. She denies any blurry vision, headaches or recent stroke like symptoms. she's been compliant with her medications. Though on med review she is only taking hydralazine 25mg q8\par \par \par her previous history :\par \par She had presented to [facility] with a pneumonia. During her hospitalization she was found to have lateral T wave inversions on EKG which were worse than previously seen. She had echocardiogram that showed normal left ventricular function. Her cardiac enzymes were negative. she had a CAT scan of the chest which was also revealing for coronary artery calcifications and calcifications to the aorta and its main branches. she had nuclear stress test which was unremarkable.\par She had a syncopal episode when she was on Chlorthalidone. She was taken to [facility] in early 2019 and her diuretic was stopped.  \par \par She had an echo in 9/2019 that showed normal systolic LV function without any significant other findings, including no significant valvular disease.

## 2021-07-13 NOTE — CARDIOLOGY SUMMARY
[de-identified] : 7/2020 5 METS poor + ischemic changes, mild mid to distal anterior wall and apex defect that were reversible and resolved with prone imaging.  [de-identified] : 7/2020 normal LV function mild diastolic dysfunction.

## 2021-07-28 ENCOUNTER — OUTPATIENT (OUTPATIENT)
Dept: OUTPATIENT SERVICES | Facility: HOSPITAL | Age: 81
LOS: 1 days | Discharge: HOME | End: 2021-07-28

## 2021-07-28 DIAGNOSIS — Z98.89 OTHER SPECIFIED POSTPROCEDURAL STATES: Chronic | ICD-10-CM

## 2021-07-28 DIAGNOSIS — D47.3 ESSENTIAL (HEMORRHAGIC) THROMBOCYTHEMIA: ICD-10-CM

## 2021-07-28 DIAGNOSIS — Z98.890 OTHER SPECIFIED POSTPROCEDURAL STATES: Chronic | ICD-10-CM

## 2021-07-29 ENCOUNTER — APPOINTMENT (OUTPATIENT)
Age: 81
End: 2021-07-29
Payer: MEDICARE

## 2021-07-29 VITALS
SYSTOLIC BLOOD PRESSURE: 144 MMHG | HEART RATE: 70 BPM | RESPIRATION RATE: 16 BRPM | WEIGHT: 154.52 LBS | OXYGEN SATURATION: 99 % | DIASTOLIC BLOOD PRESSURE: 74 MMHG | BODY MASS INDEX: 31.21 KG/M2 | TEMPERATURE: 96.6 F

## 2021-07-29 PROCEDURE — 99214 OFFICE O/P EST MOD 30 MIN: CPT

## 2021-07-29 NOTE — REVIEW OF SYSTEMS
[Patient Intake Form Reviewed] : Patient intake form was reviewed [Anxiety] : anxiety [Depression] : depression [Negative] : Neurological [Night Sweats] : no night sweats [Fatigue] : no fatigue [Recent Change In Weight] : ~T no recent weight change [de-identified] : under the care of psychiatrist for depression

## 2021-07-29 NOTE — HISTORY OF PRESENT ILLNESS
[de-identified] : 80F with past medical history is notable for HTN, HLD, esophageal stricture, bronchitis, atherosclerotic heart disease, anxiety/ depression, returning for hematologic follow-up of JAK2 positive thrombocytosis.  [FreeTextEntry1] : expectant surveillance\par \par  [de-identified] : Ms. KERR is returning for follow up of thrombocytosis. 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  Patient continues to present < 500K platelet count and reports no recent history of thromboembolic events.  Patient complains of fatigue, but denies venous thrombotic events, remains active.  She is here accompanied by a friend\par \par

## 2021-07-31 ENCOUNTER — EMERGENCY (EMERGENCY)
Facility: HOSPITAL | Age: 81
LOS: 1 days | Discharge: ANOTHER HOSPITAL | End: 2021-07-31
Attending: EMERGENCY MEDICINE | Admitting: EMERGENCY MEDICINE
Payer: MEDICARE

## 2021-07-31 ENCOUNTER — INPATIENT (INPATIENT)
Facility: HOSPITAL | Age: 81
LOS: 3 days | Discharge: HOME | DRG: 83 | End: 2021-08-04
Attending: SURGERY | Admitting: SURGERY
Payer: MEDICARE

## 2021-07-31 VITALS
RESPIRATION RATE: 18 BRPM | HEIGHT: 61 IN | SYSTOLIC BLOOD PRESSURE: 180 MMHG | OXYGEN SATURATION: 100 % | DIASTOLIC BLOOD PRESSURE: 72 MMHG | WEIGHT: 154.1 LBS | HEART RATE: 63 BPM | TEMPERATURE: 98 F

## 2021-07-31 VITALS
HEART RATE: 78 BPM | DIASTOLIC BLOOD PRESSURE: 71 MMHG | TEMPERATURE: 98 F | SYSTOLIC BLOOD PRESSURE: 185 MMHG | OXYGEN SATURATION: 100 % | RESPIRATION RATE: 18 BRPM

## 2021-07-31 VITALS
OXYGEN SATURATION: 97 % | SYSTOLIC BLOOD PRESSURE: 115 MMHG | RESPIRATION RATE: 18 BRPM | HEART RATE: 56 BPM | WEIGHT: 169.54 LBS | DIASTOLIC BLOOD PRESSURE: 79 MMHG

## 2021-07-31 DIAGNOSIS — I60.9 NONTRAUMATIC SUBARACHNOID HEMORRHAGE, UNSPECIFIED: ICD-10-CM

## 2021-07-31 DIAGNOSIS — Z98.89 OTHER SPECIFIED POSTPROCEDURAL STATES: Chronic | ICD-10-CM

## 2021-07-31 DIAGNOSIS — Z98.890 OTHER SPECIFIED POSTPROCEDURAL STATES: Chronic | ICD-10-CM

## 2021-07-31 LAB
ALBUMIN SERPL ELPH-MCNC: 3.5 G/DL — SIGNIFICANT CHANGE UP (ref 3.3–5)
ALBUMIN SERPL ELPH-MCNC: 3.8 G/DL — SIGNIFICANT CHANGE UP (ref 3.3–5.2)
ALP SERPL-CCNC: 89 U/L — SIGNIFICANT CHANGE UP (ref 40–120)
ALP SERPL-CCNC: 95 U/L — SIGNIFICANT CHANGE UP (ref 40–120)
ALT FLD-CCNC: 10 U/L — SIGNIFICANT CHANGE UP
ALT FLD-CCNC: 19 U/L — SIGNIFICANT CHANGE UP (ref 12–78)
ANION GAP SERPL CALC-SCNC: 16 MMOL/L — SIGNIFICANT CHANGE UP (ref 5–17)
ANION GAP SERPL CALC-SCNC: 9 MMOL/L — SIGNIFICANT CHANGE UP (ref 5–17)
APTT BLD: 23.9 SEC — LOW (ref 27.5–35.5)
APTT BLD: 28.5 SEC — SIGNIFICANT CHANGE UP (ref 27.5–35.5)
AST SERPL-CCNC: 22 U/L — SIGNIFICANT CHANGE UP (ref 15–37)
AST SERPL-CCNC: 24 U/L — SIGNIFICANT CHANGE UP
BASOPHILS # BLD AUTO: 0 K/UL — SIGNIFICANT CHANGE UP (ref 0–0.2)
BASOPHILS # BLD AUTO: 0.08 K/UL — SIGNIFICANT CHANGE UP (ref 0–0.2)
BASOPHILS NFR BLD AUTO: 0 % — SIGNIFICANT CHANGE UP (ref 0–2)
BASOPHILS NFR BLD AUTO: 0.6 % — SIGNIFICANT CHANGE UP (ref 0–2)
BILIRUB SERPL-MCNC: 0.3 MG/DL — LOW (ref 0.4–2)
BILIRUB SERPL-MCNC: 0.3 MG/DL — SIGNIFICANT CHANGE UP (ref 0.2–1.2)
BUN SERPL-MCNC: 18.5 MG/DL — SIGNIFICANT CHANGE UP (ref 8–20)
BUN SERPL-MCNC: 22 MG/DL — SIGNIFICANT CHANGE UP (ref 7–23)
CALCIUM SERPL-MCNC: 9 MG/DL — SIGNIFICANT CHANGE UP (ref 8.5–10.1)
CALCIUM SERPL-MCNC: 9 MG/DL — SIGNIFICANT CHANGE UP (ref 8.6–10.2)
CHLORIDE SERPL-SCNC: 103 MMOL/L — SIGNIFICANT CHANGE UP (ref 98–107)
CHLORIDE SERPL-SCNC: 107 MMOL/L — SIGNIFICANT CHANGE UP (ref 96–108)
CO2 SERPL-SCNC: 18 MMOL/L — LOW (ref 22–29)
CO2 SERPL-SCNC: 24 MMOL/L — SIGNIFICANT CHANGE UP (ref 22–31)
CREAT SERPL-MCNC: 0.97 MG/DL — SIGNIFICANT CHANGE UP (ref 0.5–1.3)
CREAT SERPL-MCNC: 1.1 MG/DL — SIGNIFICANT CHANGE UP (ref 0.5–1.3)
ELLIPTOCYTES BLD QL SMEAR: SLIGHT — SIGNIFICANT CHANGE UP
EOSINOPHIL # BLD AUTO: 0.23 K/UL — SIGNIFICANT CHANGE UP (ref 0–0.5)
EOSINOPHIL # BLD AUTO: 0.38 K/UL — SIGNIFICANT CHANGE UP (ref 0–0.5)
EOSINOPHIL NFR BLD AUTO: 1.7 % — SIGNIFICANT CHANGE UP (ref 0–6)
EOSINOPHIL NFR BLD AUTO: 1.8 % — SIGNIFICANT CHANGE UP (ref 0–6)
ETHANOL SERPL-MCNC: <10 MG/DL — SIGNIFICANT CHANGE UP (ref 0–9)
GIANT PLATELETS BLD QL SMEAR: PRESENT — SIGNIFICANT CHANGE UP
GLUCOSE SERPL-MCNC: 120 MG/DL — HIGH (ref 70–99)
GLUCOSE SERPL-MCNC: 150 MG/DL — HIGH (ref 70–99)
HCT VFR BLD CALC: 39.5 % — SIGNIFICANT CHANGE UP (ref 34.5–45)
HCT VFR BLD CALC: 39.6 % — SIGNIFICANT CHANGE UP (ref 34.5–45)
HGB BLD-MCNC: 13 G/DL — SIGNIFICANT CHANGE UP (ref 11.5–15.5)
HGB BLD-MCNC: 13.1 G/DL — SIGNIFICANT CHANGE UP (ref 11.5–15.5)
IMM GRANULOCYTES NFR BLD AUTO: 0.9 % — SIGNIFICANT CHANGE UP (ref 0–1.5)
INR BLD: 1.04 RATIO — SIGNIFICANT CHANGE UP (ref 0.88–1.16)
INR BLD: 1.06 RATIO — SIGNIFICANT CHANGE UP (ref 0.88–1.16)
LIDOCAIN IGE QN: 51 U/L — SIGNIFICANT CHANGE UP (ref 22–51)
LYMPHOCYTES # BLD AUTO: 1.87 K/UL — SIGNIFICANT CHANGE UP (ref 1–3.3)
LYMPHOCYTES # BLD AUTO: 11.4 % — LOW (ref 13–44)
LYMPHOCYTES # BLD AUTO: 13.6 % — SIGNIFICANT CHANGE UP (ref 13–44)
LYMPHOCYTES # BLD AUTO: 2.38 K/UL — SIGNIFICANT CHANGE UP (ref 1–3.3)
MANUAL SMEAR VERIFICATION: SIGNIFICANT CHANGE UP
MCHC RBC-ENTMCNC: 29 PG — SIGNIFICANT CHANGE UP (ref 27–34)
MCHC RBC-ENTMCNC: 29.5 PG — SIGNIFICANT CHANGE UP (ref 27–34)
MCHC RBC-ENTMCNC: 32.9 GM/DL — SIGNIFICANT CHANGE UP (ref 32–36)
MCHC RBC-ENTMCNC: 33.1 GM/DL — SIGNIFICANT CHANGE UP (ref 32–36)
MCV RBC AUTO: 87.6 FL — SIGNIFICANT CHANGE UP (ref 80–100)
MCV RBC AUTO: 89.6 FL — SIGNIFICANT CHANGE UP (ref 80–100)
METAMYELOCYTES # FLD: 2.6 % — HIGH (ref 0–0)
MONOCYTES # BLD AUTO: 0.54 K/UL — SIGNIFICANT CHANGE UP (ref 0–0.9)
MONOCYTES # BLD AUTO: 1.29 K/UL — HIGH (ref 0–0.9)
MONOCYTES NFR BLD AUTO: 2.6 % — SIGNIFICANT CHANGE UP (ref 2–14)
MONOCYTES NFR BLD AUTO: 9.4 % — SIGNIFICANT CHANGE UP (ref 2–14)
MYELOCYTES NFR BLD: 0.9 % — HIGH (ref 0–0)
NEUTROPHILS # BLD AUTO: 10.18 K/UL — HIGH (ref 1.8–7.4)
NEUTROPHILS # BLD AUTO: 16.83 K/UL — HIGH (ref 1.8–7.4)
NEUTROPHILS NFR BLD AUTO: 73.8 % — SIGNIFICANT CHANGE UP (ref 43–77)
NEUTROPHILS NFR BLD AUTO: 78.9 % — HIGH (ref 43–77)
NEUTS BAND # BLD: 1.8 % — SIGNIFICANT CHANGE UP (ref 0–8)
NRBC # BLD: 0 /100 WBCS — SIGNIFICANT CHANGE UP (ref 0–0)
PLAT MORPH BLD: NORMAL — SIGNIFICANT CHANGE UP
PLATELET # BLD AUTO: 400 K/UL — SIGNIFICANT CHANGE UP (ref 150–400)
PLATELET # BLD AUTO: 424 K/UL — HIGH (ref 150–400)
POIKILOCYTOSIS BLD QL AUTO: SLIGHT — SIGNIFICANT CHANGE UP
POTASSIUM SERPL-MCNC: 3 MMOL/L — LOW (ref 3.5–5.3)
POTASSIUM SERPL-MCNC: 3.6 MMOL/L — SIGNIFICANT CHANGE UP (ref 3.5–5.3)
POTASSIUM SERPL-SCNC: 3 MMOL/L — LOW (ref 3.5–5.3)
POTASSIUM SERPL-SCNC: 3.6 MMOL/L — SIGNIFICANT CHANGE UP (ref 3.5–5.3)
PROT SERPL-MCNC: 7 G/DL — SIGNIFICANT CHANGE UP (ref 6.6–8.7)
PROT SERPL-MCNC: 7.6 G/DL — SIGNIFICANT CHANGE UP (ref 6–8.3)
PROTHROM AB SERPL-ACNC: 12.2 SEC — SIGNIFICANT CHANGE UP (ref 10.6–13.6)
PROTHROM AB SERPL-ACNC: 12.3 SEC — SIGNIFICANT CHANGE UP (ref 10.6–13.6)
RBC # BLD: 4.41 M/UL — SIGNIFICANT CHANGE UP (ref 3.8–5.2)
RBC # BLD: 4.52 M/UL — SIGNIFICANT CHANGE UP (ref 3.8–5.2)
RBC # FLD: 13.1 % — SIGNIFICANT CHANGE UP (ref 10.3–14.5)
RBC # FLD: 13.2 % — SIGNIFICANT CHANGE UP (ref 10.3–14.5)
RBC BLD AUTO: ABNORMAL
SODIUM SERPL-SCNC: 137 MMOL/L — SIGNIFICANT CHANGE UP (ref 135–145)
SODIUM SERPL-SCNC: 140 MMOL/L — SIGNIFICANT CHANGE UP (ref 135–145)
WBC # BLD: 13.77 K/UL — HIGH (ref 3.8–10.5)
WBC # BLD: 20.85 K/UL — HIGH (ref 3.8–10.5)
WBC # FLD AUTO: 13.77 K/UL — HIGH (ref 3.8–10.5)
WBC # FLD AUTO: 20.85 K/UL — HIGH (ref 3.8–10.5)

## 2021-07-31 PROCEDURE — 86900 BLOOD TYPING SEROLOGIC ABO: CPT

## 2021-07-31 PROCEDURE — 73110 X-RAY EXAM OF WRIST: CPT | Mod: 26,RT

## 2021-07-31 PROCEDURE — 73090 X-RAY EXAM OF FOREARM: CPT | Mod: 26,LT

## 2021-07-31 PROCEDURE — 85730 THROMBOPLASTIN TIME PARTIAL: CPT

## 2021-07-31 PROCEDURE — 99291 CRITICAL CARE FIRST HOUR: CPT

## 2021-07-31 PROCEDURE — 85025 COMPLETE CBC W/AUTO DIFF WBC: CPT

## 2021-07-31 PROCEDURE — 93005 ELECTROCARDIOGRAM TRACING: CPT

## 2021-07-31 PROCEDURE — 70450 CT HEAD/BRAIN W/O DYE: CPT | Mod: 26,MA

## 2021-07-31 PROCEDURE — 73110 X-RAY EXAM OF WRIST: CPT | Mod: 26,LT

## 2021-07-31 PROCEDURE — 96375 TX/PRO/DX INJ NEW DRUG ADDON: CPT

## 2021-07-31 PROCEDURE — 96374 THER/PROPH/DIAG INJ IV PUSH: CPT

## 2021-07-31 PROCEDURE — U0003: CPT

## 2021-07-31 PROCEDURE — 99223 1ST HOSP IP/OBS HIGH 75: CPT

## 2021-07-31 PROCEDURE — 99222 1ST HOSP IP/OBS MODERATE 55: CPT

## 2021-07-31 PROCEDURE — 73090 X-RAY EXAM OF FOREARM: CPT

## 2021-07-31 PROCEDURE — 86850 RBC ANTIBODY SCREEN: CPT

## 2021-07-31 PROCEDURE — 80053 COMPREHEN METABOLIC PANEL: CPT

## 2021-07-31 PROCEDURE — 70486 CT MAXILLOFACIAL W/O DYE: CPT | Mod: MA

## 2021-07-31 PROCEDURE — 93010 ELECTROCARDIOGRAM REPORT: CPT

## 2021-07-31 PROCEDURE — 70486 CT MAXILLOFACIAL W/O DYE: CPT | Mod: 26,MA

## 2021-07-31 PROCEDURE — 36415 COLL VENOUS BLD VENIPUNCTURE: CPT

## 2021-07-31 PROCEDURE — 86901 BLOOD TYPING SEROLOGIC RH(D): CPT

## 2021-07-31 PROCEDURE — 70450 CT HEAD/BRAIN W/O DYE: CPT | Mod: MA

## 2021-07-31 PROCEDURE — 99285 EMERGENCY DEPT VISIT HI MDM: CPT

## 2021-07-31 PROCEDURE — 99285 EMERGENCY DEPT VISIT HI MDM: CPT | Mod: 25

## 2021-07-31 PROCEDURE — 85610 PROTHROMBIN TIME: CPT

## 2021-07-31 PROCEDURE — 72170 X-RAY EXAM OF PELVIS: CPT | Mod: 26

## 2021-07-31 PROCEDURE — U0005: CPT

## 2021-07-31 PROCEDURE — 71045 X-RAY EXAM CHEST 1 VIEW: CPT | Mod: 26

## 2021-07-31 PROCEDURE — 73110 X-RAY EXAM OF WRIST: CPT

## 2021-07-31 RX ORDER — SODIUM CHLORIDE 9 MG/ML
1000 INJECTION INTRAMUSCULAR; INTRAVENOUS; SUBCUTANEOUS ONCE
Refills: 0 | Status: COMPLETED | OUTPATIENT
Start: 2021-07-31 | End: 2021-07-31

## 2021-07-31 RX ORDER — SODIUM CHLORIDE 9 MG/ML
1000 INJECTION INTRAMUSCULAR; INTRAVENOUS; SUBCUTANEOUS ONCE
Refills: 0 | Status: DISCONTINUED | OUTPATIENT
Start: 2021-07-31 | End: 2021-07-31

## 2021-07-31 RX ORDER — SODIUM CHLORIDE 9 MG/ML
1000 INJECTION, SOLUTION INTRAVENOUS
Refills: 0 | Status: DISCONTINUED | OUTPATIENT
Start: 2021-07-31 | End: 2021-08-01

## 2021-07-31 RX ORDER — MORPHINE SULFATE 50 MG/1
2 CAPSULE, EXTENDED RELEASE ORAL ONCE
Refills: 0 | Status: DISCONTINUED | OUTPATIENT
Start: 2021-07-31 | End: 2021-07-31

## 2021-07-31 RX ORDER — NICARDIPINE HYDROCHLORIDE 30 MG/1
5 CAPSULE, EXTENDED RELEASE ORAL
Qty: 40 | Refills: 0 | Status: DISCONTINUED | OUTPATIENT
Start: 2021-07-31 | End: 2021-08-01

## 2021-07-31 RX ORDER — ONDANSETRON 8 MG/1
4 TABLET, FILM COATED ORAL ONCE
Refills: 0 | Status: COMPLETED | OUTPATIENT
Start: 2021-07-31 | End: 2021-07-31

## 2021-07-31 RX ORDER — SODIUM CHLORIDE 9 MG/ML
1000 INJECTION INTRAMUSCULAR; INTRAVENOUS; SUBCUTANEOUS
Refills: 0 | Status: DISCONTINUED | OUTPATIENT
Start: 2021-07-31 | End: 2021-08-02

## 2021-07-31 RX ORDER — ONDANSETRON 8 MG/1
4 TABLET, FILM COATED ORAL ONCE
Refills: 0 | Status: DISCONTINUED | OUTPATIENT
Start: 2021-07-31 | End: 2021-07-31

## 2021-07-31 RX ORDER — ACETAMINOPHEN 500 MG
1000 TABLET ORAL ONCE
Refills: 0 | Status: COMPLETED | OUTPATIENT
Start: 2021-07-31 | End: 2021-07-31

## 2021-07-31 RX ORDER — CHLORHEXIDINE GLUCONATE 213 G/1000ML
1 SOLUTION TOPICAL
Refills: 0 | Status: DISCONTINUED | OUTPATIENT
Start: 2021-07-31 | End: 2021-08-04

## 2021-07-31 RX ORDER — NICARDIPINE HYDROCHLORIDE 30 MG/1
5 CAPSULE, EXTENDED RELEASE ORAL
Qty: 40 | Refills: 0 | Status: DISCONTINUED | OUTPATIENT
Start: 2021-07-31 | End: 2021-08-04

## 2021-07-31 RX ADMIN — ONDANSETRON 4 MILLIGRAM(S): 8 TABLET, FILM COATED ORAL at 20:39

## 2021-07-31 RX ADMIN — NICARDIPINE HYDROCHLORIDE 25 MG/HR: 30 CAPSULE, EXTENDED RELEASE ORAL at 23:42

## 2021-07-31 RX ADMIN — MORPHINE SULFATE 2 MILLIGRAM(S): 50 CAPSULE, EXTENDED RELEASE ORAL at 20:39

## 2021-07-31 RX ADMIN — Medication 400 MILLIGRAM(S): at 23:00

## 2021-07-31 RX ADMIN — NICARDIPINE HYDROCHLORIDE 25 MG/HR: 30 CAPSULE, EXTENDED RELEASE ORAL at 21:15

## 2021-07-31 RX ADMIN — SODIUM CHLORIDE 1000 MILLILITER(S): 9 INJECTION INTRAMUSCULAR; INTRAVENOUS; SUBCUTANEOUS at 20:39

## 2021-07-31 RX ADMIN — SODIUM CHLORIDE 75 MILLILITER(S): 9 INJECTION INTRAMUSCULAR; INTRAVENOUS; SUBCUTANEOUS at 23:19

## 2021-07-31 NOTE — H&P ADULT - NSHPPHYSICALEXAM_GEN_ALL_CORE
General: NAD  HEENT: Normocephalic, atraumatic, EOMI, PEERLA, pt has ecchymosis of r orbit   Neck: Soft, midline trachea, C-collar in place.   Chest: No chest wall tenderness, thorax stable, no ecchymosis.   Cardiac: S1, S2, RRR.   Respiratory: Bilateral breath sounds, clear and equal bilaterally.   Abdomen: Right chest, shoulder, arm,forearm, pelvis  tenderness  Pelvis: Stable, rt tender   Ext: palp radial b/l UE, b/l DP palp in Lower Extrem. , pt has splint on the Rt wrist  Back: no TTP, no palpable runoff/stepoff/deformity, no abrasions.   Rectal: No naveen blood, QUENTIN with good tone.

## 2021-07-31 NOTE — H&P ADULT - ASSESSMENT
80 Y F trauma b transfer s/p fall and subarachnoid hemorrhage and R wrist fracture     Plan:   F/U ortho   f/U neuro   F/U Head CT   neurocheck   hemodynamic check

## 2021-07-31 NOTE — ED PROVIDER NOTE - OBJECTIVE STATEMENT
Pt is a 79 yo female with pmhx of CAD (coronary atherosclerotic disease) Depression GERD (gastroesophageal reflux disease)  High cholesterol    Hypertension Prediabetes Thyroid cancer on asa BIBEMS s/p fall pt tripped and fell in parking lot bumper. Pt c/o of right wrist pain right chest and rib pain no loc no nvd no dizziness. Pt is right handed.

## 2021-07-31 NOTE — ED ADULT TRIAGE NOTE - CHIEF COMPLAINT QUOTE
Patient BIBA complaining of mechanical fall with injury to right wrist, and hematoma to right eyebrow.  Patient denies any blood thinners.

## 2021-07-31 NOTE — ED PROVIDER NOTE - NS ED ROS FT
Review of Systems  •	CONSTITUTIONAL - no  fever, no diaphoresis, no weight change  •	SKIN - no rash  •	HEMATOLOGIC - no bleeding, (+)facial bruising  •	EYES - no eye pain, no blurred vision  •	ENT - no change in hearing, no pain  •	RESPIRATORY - no shortness of breath, no cough  •	CARDIAC - no chest pain, no palpitations  •	GI - no abd pain, no nausea, no vomiting, no diarrhea, no constipation, no bleeding  •	GENITO-URINARY - no discharge, no dysuria; no hematuria,   •	ENDO - no polydipsia, no polyuria, no heat/no cold intolerance  •	MUSCULOSKELETAL - (+)right wrist pain,   •	NEUROLOGIC - no weakness, no headache, no anesthesia, no paresthesias  •	PSYCH - no anxiety, non suicidal, non homicidal, no hallucination, no depression

## 2021-07-31 NOTE — ED ADULT NURSE NOTE - OBJECTIVE STATEMENT
81 y/o F patient presents to ED from home c/o fall today in parking lot. Patient reports she tripped over a bump in parking lot and landed on her right side. Patient denies LOC. Patient c/o sharp shooting pain in right wrist and right breast. Patient A&Ox4. hematoma noted over right eye. Patient denies HA, dizziness, SOB, abdominal pain, N/V/D. Safety and comfort measures provided and maintained. Sister bedside.

## 2021-07-31 NOTE — CONSULT NOTE ADULT - SUBJECTIVE AND OBJECTIVE BOX
Pt Name: ADELFO KERR    MRN: [id number]      Patient is a 80y Female presenting to the emergency department with a chief complaint of fall  Patient is a 80y old  Female who presents with a chief complaint of Tauma b transfer from outside hospital because of subarachnoid hemorrhage (31 Jul 2021 22:14). patient states was walking and tripped hitting right wrist and head. patient seen at [facility] where she was found to have SAH and transferred here.  patient with right displaced distal radius fx   .      REVIEW OF SYSTEMS    General: Alert, responsive, in NAD    Skin/Breast: No rashes, no pruritis   	  Ophthalmologic: No visual changes. No redness.   	  ENMT:	No discharge. No swelling.    Respiratory and Thorax: No difficulty breathing. No cough.  	   Cardiovascular:	No chest pain. No palpitations.    Gastrointestinal:	 No abdominal pain. No diarrhea.     Genitourinary: No dysuria. No bleeding.    Musculoskeletal: SEE HPI.    Neurological: No sensory or motor changes.     Psychiatric: No anxiety or depression.    Hematology/Lymphatics: No swelling.    Endocrine: No Hx of diabetes.    ROS is otherwise negative.    PAST MEDICAL & SURGICAL HISTORY:  PAST MEDICAL & SURGICAL HISTORY:  BP (high blood pressure)        Allergies: Allergy Status Unknown      Medications: acetaminophen  IVPB .. 1000 milliGRAM(s) IV Intermittent once  chlorhexidine 2% Cloths 1 Application(s) Topical <User Schedule>      FAMILY HISTORY:  : non-contributory    Social History: denies tobacco use    Ambulation: Walking independently                           13.0   20.85 )-----------( 424      ( 31 Jul 2021 22:27 )             39.5               Vital Signs Last 24 Hrs  T(C): --  T(F): --  HR: 56 (31 Jul 2021 22:02) (56 - 56)  BP: 115/79 (31 Jul 2021 22:02) (115/79 - 115/79)  BP(mean): --  RR: 18 (31 Jul 2021 22:02) (18 - 18)  SpO2: 97% (31 Jul 2021 22:02) (97% - 97%)    Daily     Daily       PHYSICAL EXAM:      Appearance: Alert, responsive, in no acute distress.    Neurological: Sensation is grossly intact to light touch. 5/5 motor function of all extremities. No focal deficits or weaknesses found.    Skin: contusion and swelling to right superior orbit    Vascular: 2+ distal pulses. Cap refill < 2 sec. No signs of venous insufficiency or stasis. No extremity ulcerations. No cyanosis.    Musculoskeletal:           Right Upper Extremity: positive deformity to right wrist, positive swelling to wrist. positive tenderness to wrist  no tenderness to elbow or hand, pulse intact, sensation intact to fingers, FROM to hand and elbow    Imaging Studies: 3 view right wrist from [facility] reveals displaced comminuted distal radius fx.    FRACTURE REDUCTION  PROCEDURE NOTE: Fracture reduction     Performed by:  Gilles Frederick PA-C    Indication: Acute fracture with displacement, requiring fracture reduction.    Consent: The risks and benefits of the procedure including incomplete reduction, nerve damage and bleeding were explained and the patient verbalized their understanding and wished to proceed with the procedure. Written consent was obtained following the discussion.    Universal Protocol: a time out was performed and the correct patient and site were verified     Procedure: Neurovascular exam intact prior to fracture reduction.  Skin exam : No bleeding or lacerations at the fracture site. Anesthesia/pain control, using aseptic technique, was administered using a hematoma block of 10 ml of 1% lidocaine. Reduction of the right wrist  was accomplished via axial traction and careful manipulation. Following adequate reduction and alignment of the fractured bone, the fracture was immobilized with a sugar tong plaster splint. Distally, the extremity was neurovascular intact following the procedure.  The patient tolerated the procedure well.    Post reduction films obtained and demonstrated an adequate reduction.    Complications: None      Case discussed with dr. thacker     A/P:  Pt is a 80y Female with displaced right distal radius fx     PLAN:   keep splint clean and dry   follow up orthopedics Dr. Thacker 1 week in office   VIDA OJEDA 
Patient is a 80 year old  Female who presents with a chief complaint of Tauma b transfer from outside hospital because of subarachnoid hemorrhage (31 Jul 2021 22:14)    HPI:  Transfer from outside hospital Trauma B of subarachnoid hemorrhage after a fall.  pt has R wrist fracture reduction   Patient denies fevers/chills, denies lightheadedness/dizziness, denies SOB/chest pain, denies nausea/vomiting, denies constipation/diarrhea. ***  A airway intact, C collar placed, speaking full sentences  B equal breath sounds bilaterally  C radial/DP/femoral pulses intact bilaterally   D GCS15 E4V5M6, R wrist splint, other extremities full range of movement no focal deficits, pupils R 3mm reactive/L 3mm reactive  E patient fully exposed, right orbital ecchymosis no other gross deformities or bleeding, provided warm blankets  (31 Jul 2021 22:14)    HISTORY OF PRESENT ILLNESS:   80 year old female w/ PMHX HTN, [facility]/p fall w/ SAH found on imaging. Patient seen and examined in ED, ortho team reducing R wrist fx. Patient AAOx3, following commands in all extremities. Hx/exam limited at this time d/t pain    PAST MEDICAL & SURGICAL HISTORY:  BP (high blood pressure)      FAMILY HISTORY:      SOCIAL HISTORY: unknown    Allergies  Allergy Status Unknown      REVIEW OF SYSTEMS  Negative except as noted in HPI      HOME MEDICATIONS:  Home Medications:      MEDICATIONS:  Antibiotics:    Neuro:  acetaminophen  IVPB .. 1000 milliGRAM(s) IV Intermittent once    Anticoagulation:    OTHER:  chlorhexidine 2% Cloths 1 Application(s) Topical <User Schedule>    IVF:      Vital Signs Last 24 Hrs  T(C): --  T(F): --  HR: 56 (31 Jul 2021 22:02) (56 - 56)  BP: 115/79 (31 Jul 2021 22:02) (115/79 - 115/79)  BP(mean): --  RR: 18 (31 Jul 2021 22:02) (18 - 18)  SpO2: 97% (31 Jul 2021 22:02) (97% - 97%)      PHYSICAL EXAM:  GENERAL: Well-groomed, well-developed, distressed 2/2 R wrist reduction   HEAD: Right eye ecchymosis, normocephalic.   ISAMAR COMA SCORE: E-4 V-5 M-6 = 15  MENTAL STATUS: AAO x3; Awake; Opens eyes spontaneously; Appropriately conversant without aphasia. following simple commands  CRANIAL NERVES: PERRL. EOMI without nystagmus. Facial sensation intact V1-3 distribution b/l. Face grossly symmetric w/ normal eye closure, Right eye ecchymosis. Hearing grossly intact. Speech clear.   MOTOR: strength RUE splinted, AG, limited 2/2 pain. LUE 4/5, limited 2/2 pain. b/l LE 5/5, no drift.   SENSATION: grossly intact to light touch all extremities  CHEST/LUNG: Nonlabored breaths    LABS:                        13.0   20.85 )-----------( 424      ( 31 Jul 2021 22:27 )             39.5           CULTURES:      RADIOLOGY & ADDITIONAL STUDIES:  [facility]: Small subarachnoid hemorrhage in the right temporal and left frontal region. No displaced skull fracture.  Facial CT: No acute facial bone fracture.  Punctate density near the skin surface of the right lateral periorbital soft tissue hematoma. Differential diagnosis includes dermal calcification and foreign body.

## 2021-07-31 NOTE — ED PROVIDER NOTE - PROGRESS NOTE DETAILS
SAH will transfer + wrist fracture will splint spoke with transfer center Dr. Tay [facility] and Dr. Jessica crum

## 2021-07-31 NOTE — ED ADULT NURSE REASSESSMENT NOTE - NS ED NURSE REASSESS COMMENT FT1
Assumed care from previous RN at this time. Patient is A&Ox4, in NAD. Patient presented to ED s/p trip and fall in a parking lot while assisting two blind people with ambulating. Patient denies LOC. Patient received IV Tylenol form previous RN and reports relief of pain. Pending bed placement in SICU at this time. VSS. Will continue to monitor closely.

## 2021-07-31 NOTE — ED PROVIDER NOTE - OBJECTIVE STATEMENT
80y female pt with pmhx of presents to ED BIBA as a transfer from [facility] s/p fall. Pt fell in a parking lot on her right side from standing. Denies LOC. Pt was found to have subarachnoid bleeding of right temporal, left frontal as well as a right wrist fracture at [place]. Pt on Cardene infusion for HTN. Not on blood thinners.

## 2021-07-31 NOTE — H&P ADULT - NSHPREVIEWOFSYSTEMS_GEN_ALL_CORE
Secondary Survey: ***  General: NAD  HEENT: Normocephalic, atraumatic, EOMI, PEERLA, pt has ecchymosis of r orbit   Neck: Soft, midline trachea, C-collar in place.   Chest: No chest wall tenderness, thorax stable, no ecchymosis.   Cardiac: S1, S2, RRR.   Respiratory: Bilateral breath sounds, clear and equal bilaterally.   Abdomen: Right chest, shoulder, arm,forearm, pelvis  tenderness  Pelvis: Stable, rt tender   Ext: palp radial b/l UE, b/l DP palp in Lower Extrem. , pt has splint on the Rt wrist  Back: no TTP, no palpable runoff/stepoff/deformity, no abrasions.   Rectal: No naveen blood, QUENTIN with good tone.       ROS: 10-system review is otherwise negative except HPI above.

## 2021-07-31 NOTE — ED PROVIDER NOTE - CARE PLAN
24-Aug-2017 01:00 Principal Discharge DX:	Subarachnoid bleed  Secondary Diagnosis:	Closed fracture of right wrist, initial encounter

## 2021-07-31 NOTE — ED PROVIDER NOTE - CRITICAL CARE ATTENDING CONTRIBUTION TO CARE
Upon my evaluation, this patient had a high probability of imminent or life-threatening deterioration due to SAH and traumatic injury , which required my direct attention, intervention, and personal management.    I have personally provided _40_ minutes of critical care time exclusive of time spent on separately billable procedures.  Time includes review of laboratory data, radiology results, discussion with consultants, and monitoring for potential decompensation.  Interventions were performed as documented.

## 2021-07-31 NOTE — ED ADULT NURSE NOTE - NS ED NOTE  TALK SOMEONE YN
RN Triage:      Patient calling in stated that she is in pain. She just hung up on scheduling stating she was told she could not be seen until 12/5/19. Patient is calling in with right flank pain. NO fever. NO blood in the urine. She is also having diarrhea for the last 2 weeks.   Pain medicine 1 per day and still in pain with no medicine left. Patient reported vomiting, none today but yesterday x3. She stated it looked like vegetables. She just woke up 15 minutes ago. Normal urine, no blood. She stated she has a smell to her urine but urine is clear.   Patient is stated she needs a referral for Kidney and abdominal MRI.   Patient advised an appointment for today and declined [facility] stating there would not be any interpreters. She was informed they could use the same  services as the [facility] clinic.   Patient made an appointment for tomorrow   Darlin José RN, BSN Care Connection Triage Nurse      Reason for Disposition    MODERATE pain (e.g., interferes with normal activities or awakens from sleep)    Protocols used: FLANK PAIN-A-OH       No

## 2021-07-31 NOTE — ED PROVIDER NOTE - PHYSICAL EXAMINATION
VITAL SIGNS: I have reviewed nursing notes and confirm.  CONSTITUTIONAL:  in no acute distress.  SKIN: (+)ecchymosis to right knee, old surgical scar, Skin exam is warm and dry, no acute rash.  HEAD: (+)right supraorbital hematoma.  EYES: PERRL, EOM intact; conjunctiva and sclera clear.  ENT: No nasal discharge; airway clear. Throat clear.  NECK: Supple; non tender.    CARD: Regular rate and rhythm.  RESP: No wheezes,  no rales or rhonchi.   ABD:  soft; non-distended; non-tender;   EXT: (+)rue splinted, tenderness to left hip, Normal ROM. No clubbing, cyanosis or edema.  NEURO: Alert, oriented. Grossly unremarkable. No focal deficits.  moves all extremities,  normal gait   PSYCH: Cooperative, appropriate.

## 2021-07-31 NOTE — CONSULT NOTE ADULT - ASSESSMENT
80 year old female w/ PMHX HTN, trf from [facility] s/p fall w/ SAH      Plan  - Q1 neuro checks  - Normotensive  - Pain control PRN  - Repeat CTH on arrival  - Hold all AC/AP  - Admit to SICU  - Ortho team following  - Medical management/ supportive care per primary team  - D/w Dr. Lopez 80 year old female w/ PMHX HTN, trf from [facility] s/p fall w/ SAH        Plan  - Q1 neuro checks  - SBP<140; Labetalol/Hydralazine/Cardene gtt PRN  - Pain control PRN  - Repeat CTH on arrival  - Hold all AC/AP  - b/l SCDs, no chemical DVT prophylaxis at this time  - Admit to SICU  - Ortho team following  - Medical management/ supportive care per primary team  - D/w Dr. Lopez

## 2021-07-31 NOTE — ED ADULT NURSE NOTE - NSIMPLEMENTINTERV_GEN_ALL_ED
Implemented All Fall Risk Interventions:  [place] to call system. Call bell, personal items and telephone within reach. Instruct patient to call for assistance. Room bathroom lighting operational. Non-slip footwear when patient is off stretcher. Physically safe environment: no spills, clutter or unnecessary equipment. Stretcher in lowest position, wheels locked, appropriate side rails in place. Provide visual cue, wrist band, yellow gown, etc. Monitor gait and stability. Monitor for mental status changes and reorient to person, place, and time. Review medications for side effects contributing to fall risk. Reinforce activity limits and safety measures with patient and family.

## 2021-07-31 NOTE — ED PROVIDER NOTE - CONSTITUTIONAL, MLM
Well appearing, awake, alert, oriented to person, place, time/situation hematoma to right side forehead normal...

## 2021-07-31 NOTE — ED PROVIDER NOTE - ATTENDING CONTRIBUTION TO CARE
pt s/p trip and fall today mechanical with c/o right wrist pain and headache.  Denies LOC, denies blood thinners    PE: well appearing  superficial abrasion/hematoma to right frontal forehead  right wrist deformity +2 radial pulses    labs, CT head, xray, pain control

## 2021-07-31 NOTE — ED PROVIDER NOTE - CLINICAL SUMMARY MEDICAL DECISION MAKING FREE TEXT BOX
Pt transfer from [place] for subarachnoid bleed and right wrist fracture, gcs 15 on Cardene infusion, will admit to neuro ICU

## 2021-07-31 NOTE — ED ADULT NURSE REASSESSMENT NOTE - NS ED NURSE REASSESS COMMENT FT1
As per RAYMOND Rutherford patient does not need remaining CTs as this time as per neurosurgery and may be transferred.

## 2021-08-01 ENCOUNTER — TRANSCRIPTION ENCOUNTER (OUTPATIENT)
Age: 81
End: 2021-08-01

## 2021-08-01 LAB
ALBUMIN SERPL ELPH-MCNC: 3.5 G/DL — SIGNIFICANT CHANGE UP (ref 3.3–5.2)
ALP SERPL-CCNC: 89 U/L — SIGNIFICANT CHANGE UP (ref 40–120)
ALT FLD-CCNC: 10 U/L — SIGNIFICANT CHANGE UP
ANION GAP SERPL CALC-SCNC: 10 MMOL/L — SIGNIFICANT CHANGE UP (ref 5–17)
ANION GAP SERPL CALC-SCNC: 13 MMOL/L — SIGNIFICANT CHANGE UP (ref 5–17)
ANION GAP SERPL CALC-SCNC: 13 MMOL/L — SIGNIFICANT CHANGE UP (ref 5–17)
ANION GAP SERPL CALC-SCNC: 15 MMOL/L — SIGNIFICANT CHANGE UP (ref 5–17)
AST SERPL-CCNC: 16 U/L — SIGNIFICANT CHANGE UP
BASOPHILS # BLD AUTO: 0.05 K/UL — SIGNIFICANT CHANGE UP (ref 0–0.2)
BASOPHILS NFR BLD AUTO: 0.4 % — SIGNIFICANT CHANGE UP (ref 0–2)
BILIRUB SERPL-MCNC: 0.4 MG/DL — SIGNIFICANT CHANGE UP (ref 0.4–2)
BUN SERPL-MCNC: 12.6 MG/DL — SIGNIFICANT CHANGE UP (ref 8–20)
BUN SERPL-MCNC: 13.6 MG/DL — SIGNIFICANT CHANGE UP (ref 8–20)
BUN SERPL-MCNC: 13.6 MG/DL — SIGNIFICANT CHANGE UP (ref 8–20)
BUN SERPL-MCNC: 14.9 MG/DL — SIGNIFICANT CHANGE UP (ref 8–20)
CALCIUM SERPL-MCNC: 7.8 MG/DL — LOW (ref 8.6–10.2)
CALCIUM SERPL-MCNC: 8.3 MG/DL — LOW (ref 8.6–10.2)
CALCIUM SERPL-MCNC: 8.4 MG/DL — LOW (ref 8.6–10.2)
CALCIUM SERPL-MCNC: 8.9 MG/DL — SIGNIFICANT CHANGE UP (ref 8.6–10.2)
CHLORIDE SERPL-SCNC: 102 MMOL/L — SIGNIFICANT CHANGE UP (ref 98–107)
CHLORIDE SERPL-SCNC: 102 MMOL/L — SIGNIFICANT CHANGE UP (ref 98–107)
CHLORIDE SERPL-SCNC: 103 MMOL/L — SIGNIFICANT CHANGE UP (ref 98–107)
CHLORIDE SERPL-SCNC: 105 MMOL/L — SIGNIFICANT CHANGE UP (ref 98–107)
CO2 SERPL-SCNC: 21 MMOL/L — LOW (ref 22–29)
CO2 SERPL-SCNC: 22 MMOL/L — SIGNIFICANT CHANGE UP (ref 22–29)
CO2 SERPL-SCNC: 23 MMOL/L — SIGNIFICANT CHANGE UP (ref 22–29)
CO2 SERPL-SCNC: 23 MMOL/L — SIGNIFICANT CHANGE UP (ref 22–29)
COVID-19 SPIKE DOMAIN AB INTERP: POSITIVE
COVID-19 SPIKE DOMAIN ANTIBODY RESULT: >250 U/ML — HIGH
CREAT SERPL-MCNC: 0.81 MG/DL — SIGNIFICANT CHANGE UP (ref 0.5–1.3)
CREAT SERPL-MCNC: 0.93 MG/DL — SIGNIFICANT CHANGE UP (ref 0.5–1.3)
CREAT SERPL-MCNC: 0.97 MG/DL — SIGNIFICANT CHANGE UP (ref 0.5–1.3)
CREAT SERPL-MCNC: 1.08 MG/DL — SIGNIFICANT CHANGE UP (ref 0.5–1.3)
EOSINOPHIL # BLD AUTO: 0.05 K/UL — SIGNIFICANT CHANGE UP (ref 0–0.5)
EOSINOPHIL NFR BLD AUTO: 0.4 % — SIGNIFICANT CHANGE UP (ref 0–6)
GAS PNL BLDA: SIGNIFICANT CHANGE UP
GLUCOSE BLDC GLUCOMTR-MCNC: 101 MG/DL — HIGH (ref 70–99)
GLUCOSE BLDC GLUCOMTR-MCNC: 131 MG/DL — HIGH (ref 70–99)
GLUCOSE BLDC GLUCOMTR-MCNC: 132 MG/DL — HIGH (ref 70–99)
GLUCOSE SERPL-MCNC: 118 MG/DL — HIGH (ref 70–99)
GLUCOSE SERPL-MCNC: 123 MG/DL — HIGH (ref 70–99)
GLUCOSE SERPL-MCNC: 136 MG/DL — HIGH (ref 70–99)
GLUCOSE SERPL-MCNC: 144 MG/DL — HIGH (ref 70–99)
HCT VFR BLD CALC: 36.8 % — SIGNIFICANT CHANGE UP (ref 34.5–45)
HGB BLD-MCNC: 11.8 G/DL — SIGNIFICANT CHANGE UP (ref 11.5–15.5)
IMM GRANULOCYTES NFR BLD AUTO: 0.5 % — SIGNIFICANT CHANGE UP (ref 0–1.5)
LACTATE BLDV-MCNC: 1.1 MMOL/L — SIGNIFICANT CHANGE UP (ref 0.5–2)
LACTATE SERPL-SCNC: 1 MMOL/L — SIGNIFICANT CHANGE UP (ref 0.5–2)
LYMPHOCYTES # BLD AUTO: 1.8 K/UL — SIGNIFICANT CHANGE UP (ref 1–3.3)
LYMPHOCYTES # BLD AUTO: 13.4 % — SIGNIFICANT CHANGE UP (ref 13–44)
MAGNESIUM SERPL-MCNC: 1 MG/DL — CRITICAL LOW (ref 1.6–2.6)
MAGNESIUM SERPL-MCNC: 1.1 MG/DL — LOW (ref 1.6–2.6)
MAGNESIUM SERPL-MCNC: 2.1 MG/DL — SIGNIFICANT CHANGE UP (ref 1.6–2.6)
MAGNESIUM SERPL-MCNC: 2.4 MG/DL — SIGNIFICANT CHANGE UP (ref 1.6–2.6)
MCHC RBC-ENTMCNC: 28.9 PG — SIGNIFICANT CHANGE UP (ref 27–34)
MCHC RBC-ENTMCNC: 32.1 GM/DL — SIGNIFICANT CHANGE UP (ref 32–36)
MCV RBC AUTO: 90 FL — SIGNIFICANT CHANGE UP (ref 80–100)
MONOCYTES # BLD AUTO: 1.22 K/UL — HIGH (ref 0–0.9)
MONOCYTES NFR BLD AUTO: 9.1 % — SIGNIFICANT CHANGE UP (ref 2–14)
NEUTROPHILS # BLD AUTO: 10.2 K/UL — HIGH (ref 1.8–7.4)
NEUTROPHILS NFR BLD AUTO: 76.2 % — SIGNIFICANT CHANGE UP (ref 43–77)
PHOSPHATE SERPL-MCNC: 2.4 MG/DL — SIGNIFICANT CHANGE UP (ref 2.4–4.7)
PHOSPHATE SERPL-MCNC: 3.1 MG/DL — SIGNIFICANT CHANGE UP (ref 2.4–4.7)
PHOSPHATE SERPL-MCNC: 3.4 MG/DL — SIGNIFICANT CHANGE UP (ref 2.4–4.7)
PLATELET # BLD AUTO: 373 K/UL — SIGNIFICANT CHANGE UP (ref 150–400)
POTASSIUM SERPL-MCNC: 2.6 MMOL/L — CRITICAL LOW (ref 3.5–5.3)
POTASSIUM SERPL-MCNC: 2.7 MMOL/L — CRITICAL LOW (ref 3.5–5.3)
POTASSIUM SERPL-MCNC: 3.6 MMOL/L — SIGNIFICANT CHANGE UP (ref 3.5–5.3)
POTASSIUM SERPL-MCNC: 4.2 MMOL/L — SIGNIFICANT CHANGE UP (ref 3.5–5.3)
POTASSIUM SERPL-SCNC: 2.6 MMOL/L — CRITICAL LOW (ref 3.5–5.3)
POTASSIUM SERPL-SCNC: 2.7 MMOL/L — CRITICAL LOW (ref 3.5–5.3)
POTASSIUM SERPL-SCNC: 3.6 MMOL/L — SIGNIFICANT CHANGE UP (ref 3.5–5.3)
POTASSIUM SERPL-SCNC: 4.2 MMOL/L — SIGNIFICANT CHANGE UP (ref 3.5–5.3)
PROT SERPL-MCNC: 6.6 G/DL — SIGNIFICANT CHANGE UP (ref 6.6–8.7)
RBC # BLD: 4.09 M/UL — SIGNIFICANT CHANGE UP (ref 3.8–5.2)
RBC # FLD: 13.1 % — SIGNIFICANT CHANGE UP (ref 10.3–14.5)
SARS-COV-2 IGG+IGM SERPL QL IA: >250 U/ML — HIGH
SARS-COV-2 IGG+IGM SERPL QL IA: POSITIVE
SARS-COV-2 RNA SPEC QL NAA+PROBE: SIGNIFICANT CHANGE UP
SODIUM SERPL-SCNC: 137 MMOL/L — SIGNIFICANT CHANGE UP (ref 135–145)
SODIUM SERPL-SCNC: 138 MMOL/L — SIGNIFICANT CHANGE UP (ref 135–145)
SODIUM SERPL-SCNC: 138 MMOL/L — SIGNIFICANT CHANGE UP (ref 135–145)
SODIUM SERPL-SCNC: 139 MMOL/L — SIGNIFICANT CHANGE UP (ref 135–145)
WBC # BLD: 13.39 K/UL — HIGH (ref 3.8–10.5)
WBC # FLD AUTO: 13.39 K/UL — HIGH (ref 3.8–10.5)

## 2021-08-01 PROCEDURE — 73130 X-RAY EXAM OF HAND: CPT | Mod: 26,LT

## 2021-08-01 PROCEDURE — 99232 SBSQ HOSP IP/OBS MODERATE 35: CPT

## 2021-08-01 PROCEDURE — 70450 CT HEAD/BRAIN W/O DYE: CPT | Mod: 26

## 2021-08-01 PROCEDURE — 73080 X-RAY EXAM OF ELBOW: CPT | Mod: 26,LT

## 2021-08-01 PROCEDURE — 72125 CT NECK SPINE W/O DYE: CPT | Mod: 26

## 2021-08-01 RX ORDER — CALCIUM GLUCONATE 100 MG/ML
2 VIAL (ML) INTRAVENOUS ONCE
Refills: 0 | Status: COMPLETED | OUTPATIENT
Start: 2021-08-01 | End: 2021-08-01

## 2021-08-01 RX ORDER — PANTOPRAZOLE SODIUM 20 MG/1
40 TABLET, DELAYED RELEASE ORAL
Refills: 0 | Status: DISCONTINUED | OUTPATIENT
Start: 2021-08-01 | End: 2021-08-04

## 2021-08-01 RX ORDER — FERROUS SULFATE 325(65) MG
325 TABLET ORAL DAILY
Refills: 0 | Status: DISCONTINUED | OUTPATIENT
Start: 2021-08-01 | End: 2021-08-04

## 2021-08-01 RX ORDER — HYDRALAZINE HCL 50 MG
10 TABLET ORAL ONCE
Refills: 0 | Status: DISCONTINUED | OUTPATIENT
Start: 2021-08-01 | End: 2021-08-01

## 2021-08-01 RX ORDER — SERTRALINE 25 MG/1
25 TABLET, FILM COATED ORAL DAILY
Refills: 0 | Status: DISCONTINUED | OUTPATIENT
Start: 2021-08-01 | End: 2021-08-04

## 2021-08-01 RX ORDER — HYDRALAZINE HCL 50 MG
25 TABLET ORAL THREE TIMES A DAY
Refills: 0 | Status: DISCONTINUED | OUTPATIENT
Start: 2021-08-01 | End: 2021-08-04

## 2021-08-01 RX ORDER — LANOLIN ALCOHOL/MO/W.PET/CERES
3 CREAM (GRAM) TOPICAL AT BEDTIME
Refills: 0 | Status: DISCONTINUED | OUTPATIENT
Start: 2021-08-01 | End: 2021-08-04

## 2021-08-01 RX ORDER — SODIUM,POTASSIUM PHOSPHATES 278-250MG
2 POWDER IN PACKET (EA) ORAL ONCE
Refills: 0 | Status: COMPLETED | OUTPATIENT
Start: 2021-08-01 | End: 2021-08-01

## 2021-08-01 RX ORDER — ATORVASTATIN CALCIUM 80 MG/1
40 TABLET, FILM COATED ORAL AT BEDTIME
Refills: 0 | Status: DISCONTINUED | OUTPATIENT
Start: 2021-08-01 | End: 2021-08-04

## 2021-08-01 RX ORDER — NICARDIPINE HYDROCHLORIDE 30 MG/1
5 CAPSULE, EXTENDED RELEASE ORAL
Qty: 40 | Refills: 0 | Status: DISCONTINUED | OUTPATIENT
Start: 2021-08-01 | End: 2021-08-01

## 2021-08-01 RX ORDER — LIDOCAINE 4 G/100G
1 CREAM TOPICAL EVERY 24 HOURS
Refills: 0 | Status: DISCONTINUED | OUTPATIENT
Start: 2021-08-01 | End: 2021-08-04

## 2021-08-01 RX ORDER — ACETAMINOPHEN 500 MG
650 TABLET ORAL EVERY 6 HOURS
Refills: 0 | Status: DISCONTINUED | OUTPATIENT
Start: 2021-08-01 | End: 2021-08-04

## 2021-08-01 RX ORDER — POTASSIUM CHLORIDE 20 MEQ
40 PACKET (EA) ORAL ONCE
Refills: 0 | Status: COMPLETED | OUTPATIENT
Start: 2021-08-01 | End: 2021-08-01

## 2021-08-01 RX ORDER — CHOLECALCIFEROL (VITAMIN D3) 125 MCG
2000 CAPSULE ORAL DAILY
Refills: 0 | Status: DISCONTINUED | OUTPATIENT
Start: 2021-08-01 | End: 2021-08-04

## 2021-08-01 RX ORDER — MAGNESIUM SULFATE 500 MG/ML
4 VIAL (ML) INJECTION ONCE
Refills: 0 | Status: COMPLETED | OUTPATIENT
Start: 2021-08-01 | End: 2021-08-01

## 2021-08-01 RX ORDER — INSULIN LISPRO 100/ML
VIAL (ML) SUBCUTANEOUS
Refills: 0 | Status: DISCONTINUED | OUTPATIENT
Start: 2021-08-01 | End: 2021-08-04

## 2021-08-01 RX ORDER — OXYCODONE HYDROCHLORIDE 5 MG/1
2.5 TABLET ORAL EVERY 4 HOURS
Refills: 0 | Status: DISCONTINUED | OUTPATIENT
Start: 2021-08-01 | End: 2021-08-04

## 2021-08-01 RX ORDER — POTASSIUM CHLORIDE 20 MEQ
20 PACKET (EA) ORAL
Refills: 0 | Status: COMPLETED | OUTPATIENT
Start: 2021-08-01 | End: 2021-08-01

## 2021-08-01 RX ORDER — OXYCODONE HYDROCHLORIDE 5 MG/1
10 TABLET ORAL EVERY 4 HOURS
Refills: 0 | Status: DISCONTINUED | OUTPATIENT
Start: 2021-08-01 | End: 2021-08-02

## 2021-08-01 RX ORDER — POTASSIUM CHLORIDE 20 MEQ
40 PACKET (EA) ORAL ONCE
Refills: 0 | Status: DISCONTINUED | OUTPATIENT
Start: 2021-08-01 | End: 2021-08-01

## 2021-08-01 RX ADMIN — LIDOCAINE 1 PATCH: 4 CREAM TOPICAL at 11:51

## 2021-08-01 RX ADMIN — NICARDIPINE HYDROCHLORIDE 25 MG/HR: 30 CAPSULE, EXTENDED RELEASE ORAL at 02:33

## 2021-08-01 RX ADMIN — NICARDIPINE HYDROCHLORIDE 25 MG/HR: 30 CAPSULE, EXTENDED RELEASE ORAL at 07:15

## 2021-08-01 RX ADMIN — Medication 650 MILLIGRAM(S): at 15:50

## 2021-08-01 RX ADMIN — Medication 100 GRAM(S): at 11:32

## 2021-08-01 RX ADMIN — Medication 1 TABLET(S): at 16:36

## 2021-08-01 RX ADMIN — Medication 40 MILLIEQUIVALENT(S): at 22:38

## 2021-08-01 RX ADMIN — Medication 3 MILLIGRAM(S): at 21:52

## 2021-08-01 RX ADMIN — CHLORHEXIDINE GLUCONATE 1 APPLICATION(S): 213 SOLUTION TOPICAL at 06:51

## 2021-08-01 RX ADMIN — Medication 650 MILLIGRAM(S): at 14:49

## 2021-08-01 RX ADMIN — Medication 25 MILLIGRAM(S): at 22:01

## 2021-08-01 RX ADMIN — PANTOPRAZOLE SODIUM 40 MILLIGRAM(S): 20 TABLET, DELAYED RELEASE ORAL at 14:41

## 2021-08-01 RX ADMIN — OXYCODONE HYDROCHLORIDE 10 MILLIGRAM(S): 5 TABLET ORAL at 18:58

## 2021-08-01 RX ADMIN — LIDOCAINE 1 PATCH: 4 CREAM TOPICAL at 20:00

## 2021-08-01 RX ADMIN — OXYCODONE HYDROCHLORIDE 10 MILLIGRAM(S): 5 TABLET ORAL at 19:35

## 2021-08-01 RX ADMIN — Medication 200 GRAM(S): at 13:14

## 2021-08-01 RX ADMIN — NICARDIPINE HYDROCHLORIDE 25 MG/HR: 30 CAPSULE, EXTENDED RELEASE ORAL at 01:20

## 2021-08-01 RX ADMIN — Medication 650 MILLIGRAM(S): at 09:45

## 2021-08-01 RX ADMIN — SODIUM CHLORIDE 75 MILLILITER(S): 9 INJECTION INTRAMUSCULAR; INTRAVENOUS; SUBCUTANEOUS at 11:51

## 2021-08-01 RX ADMIN — Medication 2000 UNIT(S): at 18:58

## 2021-08-01 RX ADMIN — Medication 50 MILLIEQUIVALENT(S): at 13:14

## 2021-08-01 RX ADMIN — Medication 50 MILLIEQUIVALENT(S): at 16:36

## 2021-08-01 RX ADMIN — ATORVASTATIN CALCIUM 40 MILLIGRAM(S): 80 TABLET, FILM COATED ORAL at 21:52

## 2021-08-01 RX ADMIN — Medication 2 PACKET(S): at 22:38

## 2021-08-01 RX ADMIN — Medication 650 MILLIGRAM(S): at 10:27

## 2021-08-01 RX ADMIN — Medication 50 MILLIEQUIVALENT(S): at 11:52

## 2021-08-01 RX ADMIN — SERTRALINE 25 MILLIGRAM(S): 25 TABLET, FILM COATED ORAL at 14:41

## 2021-08-01 RX ADMIN — Medication 325 MILLIGRAM(S): at 16:36

## 2021-08-01 NOTE — PATIENT PROFILE ADULT - BILL PAYMENT
Detail Level: Detailed Products Recommended: UV spot stickers General Sunscreen Counseling: I recommended a broad spectrum sunscreen with a SPF of 50 or higher. I explained that SPF 50 sunscreens block approximately 97 percent minutes prior to expected sun exposure and then every 2 hours after that as long as sun exposure continues. If swimming or exerci of the sun's harmful rays. Sunscreens should be applied at least 30 mins prior to sun exposure and sunscreen should be reapplied every 45 minutes to an hour after getting wet or sweating. One ounce, or the equivalent of a shot glass full of sunscreen, is adequate to protect the skin not covered by a bathing suit. I also recommended a lip balm with a sunscreen as well. Sun protective clothing can be used in lieu of sunscreen but must be worn the entire time you are exposed to the sun's rays. no

## 2021-08-01 NOTE — DISCHARGE NOTE PROVIDER - CARE PROVIDER_API CALL
Leonel Mays)  Plastic Surgery  [address] [place] [address]  Phone: [phone]  Fax: [phone]  Follow Up Time:    Leonel Mays)  Plastic Surgery  [address] [place] [id number]  Phone: [phone]  Fax: [phone]  Follow Up Time:     Wong Lopez; PhD)  Neurosurgery  [address] [place] [address]  Phone: [phone]  Fax: [phone]  Follow Up Time:

## 2021-08-01 NOTE — PROGRESS NOTE ADULT - SUBJECTIVE AND OBJECTIVE BOX
Patient is a 80 year old  Female who presents with a chief complaint of Tauma b transfer from outside hospital because of subarachnoid hemorrhage (31 Jul 2021 22:14)    HPI:  Transfer from outside hospital Trauma B of subarachnoid hemorrhage after a fall.  pt has R wrist fracture reduction   Patient denies fevers/chills, denies lightheadedness/dizziness, denies SOB/chest pain, denies nausea/vomiting, denies constipation/diarrhea. ***  A airway intact, C collar placed, speaking full sentences  B equal breath sounds bilaterally  C radial/DP/femoral pulses intact bilaterally   D GCS15 E4V5M6, R wrist splint, other extremities full range of movement no focal deficits, pupils R 3mm reactive/L 3mm reactive  E patient fully exposed, right orbital ecchymosis no other gross deformities or bleeding, provided warm blankets  (31 Jul 2021 22:14)    INTERVAL HPI/OVERNIGHT EVENTS:  80y Female seen lying comfortably in bed. Pt. c/o L hand pain that radiates to elbow. Denies headache. No other complaints.    Vital Signs Last 24 Hrs  T(C): 36.8 (01 Aug 2021 15:26), Max: 37.2 (31 Jul 2021 23:00)  T(F): 98.3 (01 Aug 2021 15:26), Max: 99 (31 Jul 2021 23:00)  HR: 69 (01 Aug 2021 16:41) (51 - 85)  BP: 163/66 (01 Aug 2021 16:41) (95/54 - 166/72)  BP(mean): 93 (01 Aug 2021 16:41) (68 - 98)  RR: 15 (01 Aug 2021 16:41) (12 - 23)  SpO2: 97% (01 Aug 2021 16:41) (90% - 98%)    PHYSICAL EXAM:  GENERAL: NAD, well-groomed, well-developed  HEAD:  R periorbital ecchymosis, normocephalic  ISAMAR COMA SCORE: E-4 V- 5 M- 6 = 15       E: 4= opens eyes spontaneously 3= to voice 2= to noxious 1= no opening       V: 5= oriented 4= confused 3= inappropriate words 2= incomprehensible sounds 1= nonverbal 1T= intubated       M: 6= follows commands 5= localizes 4= withdraws 3= flexor posturing 2= extensor posturing 1= no movement  MENTAL STATUS: AAO x3; Awake; Opens eyes spontaneously; Appropriately conversant without aphasia; following simple commands  CRANIAL NERVES: PERRL. EOMI without nystagmus. Facial sensation intact V1-3 distribution b/l. Face symmetric w/ normal eye closure and smile, tongue midline.   MOTOR: RUE splinted motor exam limited 2/2 pain with 5/5 HG, LUE limited 2/2 pain 4+/5, B/L LE 5/5  Uppers     Delt (C5/6)     Bicep (C5/6)     Wrist Extend (C6)     Tricep (C7)     HG (C8/T1)  R                     5/5                 5/5                         5/5                           5/5                   5/5  L                      5/5                 5/5                         5/5                           5/5                   5/5  Lowers      HF(L1/L2)     KE (L3)     DF (L4)     EHL (L5)     PF (S1)      R                     5/5              5/5           5/5           5/5            5/5  L                     5/5               5/5          5/5            5/5            5/5  SENSATION: grossly intact to light touch all extremities  EXTREMITIES:  2+ peripheral pulses, no clubbing, cyanosis, or edema  SKIN: Warm, dry; no rashes or lesions    LABS:                        11.8   13.39 )-----------( 373      ( 01 Aug 2021 06:34 )             36.8     08-01    See note  |  See note  |  See note  ----------------------------<  See note  See note   |  See note  |  See note    Ca    See note      01 Aug 2021 16:36  Phos  See note     08-01  Mg     See note     08-01    TPro  6.6  /  Alb  3.5  /  TBili  0.4  /  DBili  x   /  AST  16  /  ALT  10  /  AlkPhos  89  08-01    PT/INR - ( 31 Jul 2021 22:27 )   PT: 12.3 sec;   INR: 1.06 ratio         PTT - ( 31 Jul 2021 22:27 )  PTT:23.9 sec      07-31 @ 07:01  -  08-01 @ 07:00  --------------------------------------------------------  IN: 352.5 mL / OUT: 300 mL / NET: 52.5 mL    08-01 @ 07:01  -  08-01 @ 18:22  --------------------------------------------------------  IN: 1400 mL / OUT: 800 mL / NET: 600 mL        RADIOLOGY & ADDITIONAL TESTS:  < from: CT Head No Cont (08.01.21 @ 01:48) >    IMPRESSION:    CT head: Unchanged subarachnoid hemorrhage in the right temporal and left frontal lobes.No new areas of hemorrhage.    CT C-spine: No acute fracture or subluxation.    --- End of Report ---            GORDON GUZMAN DO; Attending Radiologist  This document has been electronically signed. Aug  1 2021  4:19AM    < end of copied text >

## 2021-08-01 NOTE — PROGRESS NOTE ADULT - SUBJECTIVE AND OBJECTIVE BOX
24h Events:  80 y.o female transferred from [facility] [place] after sustaining a SAH and a R wrist fracture after a fall from standing. Pt with a metabolic acidosis on admission secondary to mild lactic acidosis which has since improved on repeat labs. On exam this AM, patient complaining of L hand pain and R chest pain, MSK in nature likely related to rib fracture vs. muscle strain. Pt started on PIC Protocol and lidocaine patch added to improve pain control.     ICU Vital Signs Last 24 Hrs  T(C): 36.8 (01 Aug 2021 11:23), Max: 37.2 (31 Jul 2021 23:00)  T(F): 98.2 (01 Aug 2021 11:23), Max: 99 (31 Jul 2021 23:00)  HR: 58 (01 Aug 2021 10:00) (56 - 85)  BP: 139/60 (01 Aug 2021 10:00) (115/79 - 156/68)  BP(mean): 79 (01 Aug 2021 10:00) (76 - 87)  ABP: --  ABP(mean): --  RR: 15 (01 Aug 2021 10:00) (12 - 21)  SpO2: 97% (01 Aug 2021 10:00) (91% - 98%)    I&O's Detail    31 Jul 2021 07:01  -  01 Aug 2021 07:00  --------------------------------------------------------  IN:    NiCARdipine: 52.5 mL    sodium chloride 0.9%: 300 mL  Total IN: 352.5 mL    OUT:    Voided (mL): 300 mL  Total OUT: 300 mL    Total NET: 52.5 mL      01 Aug 2021 07:01  -  01 Aug 2021 11:39  --------------------------------------------------------  IN:    NiCARdipine: 5 mL    sodium chloride 0.9%: 225 mL  Total IN: 230 mL    OUT:  Total OUT: 0 mL    Total NET: 230 mL    MEDICATIONS  (STANDING):  chlorhexidine 2% Cloths 1 Application(s) Topical <User Schedule>  insulin lispro (ADMELOG) corrective regimen sliding scale   SubCutaneous four times a day before meals  lidocaine   4% Patch 1 Patch Transdermal every 24 hours  potassium chloride  20 mEq/100 mL IVPB 20 milliEquivalent(s) IV Intermittent every 2 hours  sodium chloride 0.9%. 1000 milliLiter(s) (75 mL/Hr) IV Continuous <Continuous>    MEDICATIONS  (PRN):  acetaminophen   Tablet .. 650 milliGRAM(s) Oral every 6 hours PRN Mild Pain (1 - 3)    Physical Exam:    Gen: Resting comfortably in bed    HEENT: PERRL, EOMI    Neurological: Alert and oriented x3 without focal deficit    Neck: Trachea midline, no evidence of JVD, FROM without pain, neck symmetric    Pulmonary: CTAB with decreased breath sounds at the bases    Cardiovascular: S1S2, intermittently bradycardic (asymptomatic)    Gastrointestinal: Soft, non-tender, non-distended    : voiding via primafit    Extremities: R wrist immobilized in plaster splint    Skin: R forehead ecchymosis    Musculoskeletal: L hand pain, R wrist immobilized in plaster splint, R chest wall pain    LABS:  CBC Full  -  ( 01 Aug 2021 06:34 )  WBC Count : 13.39 K/uL  RBC Count : 4.09 M/uL  Hemoglobin : 11.8 g/dL  Hematocrit : 36.8 %  Platelet Count - Automated : 373 K/uL  Mean Cell Volume : 90.0 fl  Mean Cell Hemoglobin : 28.9 pg  Mean Cell Hemoglobin Concentration : 32.1 gm/dL  Auto Neutrophil # : 10.20 K/uL  Auto Lymphocyte # : 1.80 K/uL  Auto Monocyte # : 1.22 K/uL  Auto Eosinophil # : 0.05 K/uL  Auto Basophil # : 0.05 K/uL  Auto Neutrophil % : 76.2 %  Auto Lymphocyte % : 13.4 %  Auto Monocyte % : 9.1 %  Auto Eosinophil % : 0.4 %  Auto Basophil % : 0.4 %    08-01    138  |  102  |  13.6  ----------------------------<  118<H>  2.7<LL>   |  21.0<L>  |  0.81    Ca    8.4<L>      01 Aug 2021 09:16  Phos  3.1     08-01  Mg     1.1     08-01    TPro  6.6  /  Alb  3.5  /  TBili  0.4  /  DBili  x   /  AST  16  /  ALT  10  /  AlkPhos  89  08-01    PT/INR - ( 31 Jul 2021 22:27 )   PT: 12.3 sec;   INR: 1.06 ratio      PTT - ( 31 Jul 2021 22:27 )  PTT:23.9 sec    RECENT CULTURES:    LIVER FUNCTIONS - ( 01 Aug 2021 06:34 )  Alb: 3.5 g/dL / Pro: 6.6 g/dL / ALK PHOS: 89 U/L / ALT: 10 U/L / AST: 16 U/L / GGT: x           ASSESSMENT/PLAN:  80y Female transferred from [facility] [place] w/ a SDH R wrist fracture, and R chest wall pain likely secondary to rib fracture. Admitted to the ICU for q1 hour neuro checks and PIC Protocol.     Neuro: Pain control with tylenol and lidocaine patch, will increased pain regimen as needed. Patient reports pain is well controlled at this point. Regular sleep wake cycles as able, will add melatonin to start tonight to improve sleep hygiene.     CV:     Pulm:     GI/Nutrition:     /Renal: Smith for strict I&O    ID:     Endo:     Skin: Repositioning for DTI prevention while in bed    Heme/DVT Prophylaxis: SCDs    Lines/Tubes:     Dispo:      24h Events:  80 y.o female transferred from [facility] [place] after sustaining a SAH and a R wrist fracture after a fall from standing. Pt with a metabolic acidosis on admission secondary to mild lactic acidosis which has since improved on repeat labs. On exam this AM, patient complaining of L hand pain and R chest pain, MSK in nature likely related to rib fracture vs. muscle strain. Pt started on PIC Protocol and lidocaine patch added to improve pain control.     ICU Vital Signs Last 24 Hrs  T(C): 36.8 (01 Aug 2021 11:23), Max: 37.2 (31 Jul 2021 23:00)  T(F): 98.2 (01 Aug 2021 11:23), Max: 99 (31 Jul 2021 23:00)  HR: 58 (01 Aug 2021 10:00) (56 - 85)  BP: 139/60 (01 Aug 2021 10:00) (115/79 - 156/68)  BP(mean): 79 (01 Aug 2021 10:00) (76 - 87)  ABP: --  ABP(mean): --  RR: 15 (01 Aug 2021 10:00) (12 - 21)  SpO2: 97% (01 Aug 2021 10:00) (91% - 98%)    I&O's Detail    31 Jul 2021 07:01  -  01 Aug 2021 07:00  --------------------------------------------------------  IN:    NiCARdipine: 52.5 mL    sodium chloride 0.9%: 300 mL  Total IN: 352.5 mL    OUT:    Voided (mL): 300 mL  Total OUT: 300 mL    Total NET: 52.5 mL      01 Aug 2021 07:01  -  01 Aug 2021 11:39  --------------------------------------------------------  IN:    NiCARdipine: 5 mL    sodium chloride 0.9%: 225 mL  Total IN: 230 mL    OUT:  Total OUT: 0 mL    Total NET: 230 mL    MEDICATIONS  (STANDING):  chlorhexidine 2% Cloths 1 Application(s) Topical <User Schedule>  insulin lispro (ADMELOG) corrective regimen sliding scale   SubCutaneous four times a day before meals  lidocaine   4% Patch 1 Patch Transdermal every 24 hours  potassium chloride  20 mEq/100 mL IVPB 20 milliEquivalent(s) IV Intermittent every 2 hours  sodium chloride 0.9%. 1000 milliLiter(s) (75 mL/Hr) IV Continuous <Continuous>    MEDICATIONS  (PRN):  acetaminophen   Tablet .. 650 milliGRAM(s) Oral every 6 hours PRN Mild Pain (1 - 3)    Physical Exam:    Gen: Resting comfortably in bed    HEENT: PERRL, EOMI    Neurological: Alert and oriented x3 without focal deficit    Neck: Trachea midline, no evidence of JVD, FROM without pain, neck symmetric    Pulmonary: CTAB with decreased breath sounds at the bases    Cardiovascular: S1S2, intermittently bradycardic (asymptomatic)    Gastrointestinal: Soft, non-tender, non-distended    : voiding via primafit    Extremities: R wrist immobilized in plaster splint    Skin: R forehead ecchymosis    Musculoskeletal: L hand pain, R wrist immobilized in plaster splint, R chest wall pain    LABS:  CBC Full  -  ( 01 Aug 2021 06:34 )  WBC Count : 13.39 K/uL  RBC Count : 4.09 M/uL  Hemoglobin : 11.8 g/dL  Hematocrit : 36.8 %  Platelet Count - Automated : 373 K/uL  Mean Cell Volume : 90.0 fl  Mean Cell Hemoglobin : 28.9 pg  Mean Cell Hemoglobin Concentration : 32.1 gm/dL  Auto Neutrophil # : 10.20 K/uL  Auto Lymphocyte # : 1.80 K/uL  Auto Monocyte # : 1.22 K/uL  Auto Eosinophil # : 0.05 K/uL  Auto Basophil # : 0.05 K/uL  Auto Neutrophil % : 76.2 %  Auto Lymphocyte % : 13.4 %  Auto Monocyte % : 9.1 %  Auto Eosinophil % : 0.4 %  Auto Basophil % : 0.4 %    08-01    138  |  102  |  13.6  ----------------------------<  118<H>  2.7<LL>   |  21.0<L>  |  0.81    Ca    8.4<L>      01 Aug 2021 09:16  Phos  3.1     08-01  Mg     1.1     08-01    TPro  6.6  /  Alb  3.5  /  TBili  0.4  /  DBili  x   /  AST  16  /  ALT  10  /  AlkPhos  89  08-01    PT/INR - ( 31 Jul 2021 22:27 )   PT: 12.3 sec;   INR: 1.06 ratio      PTT - ( 31 Jul 2021 22:27 )  PTT:23.9 sec    RECENT CULTURES:    LIVER FUNCTIONS - ( 01 Aug 2021 06:34 )  Alb: 3.5 g/dL / Pro: 6.6 g/dL / ALK PHOS: 89 U/L / ALT: 10 U/L / AST: 16 U/L / GGT: x           ASSESSMENT/PLAN:  80y Female transferred from [facility] [place] w/ a SDH R wrist fracture, and R chest wall pain likely secondary to rib fracture. Admitted to the ICU for q1 hour neuro checks and PIC Protocol. Will re-start home sertraline.    Neuro: Pain control with tylenol and lidocaine patch, will increased pain regimen as needed. Patient reports pain is well controlled at this point. Regular sleep wake cycles as able, will add melatonin to start tonight to improve sleep hygiene.     CV: HD stable, intermittently SB while sleeping. Currently holding home antihypertensives, will re-start as appropriate. re-start home statin. lactate cleared.    Pulm: PIC Protocol, obtain baseline ABG, encourage coughing and deep breathing.    GI/Nutrition: started consistent carb diet    /Renal: voiding, strict intake output, metabolic acidosis resolved. repleating hypokalemia and hypomagnesemia. Repeat BMP at 5 PM this evening.     ID: no issues. Monitor for fevers, monitor WBC.    Endo: FS q6, ISS PRN. holding home metformin.    Skin: Repositioning for DTI prevention while in bed    Heme/DVT Prophylaxis: SCDs, no chemical ppx at this time given recent SHD. F/U with neurosurgery when okay to start chemical ppx.    Lines/Tubes: PIVs    Dispo: PT/OT, downgrade in AM

## 2021-08-01 NOTE — DISCHARGE NOTE PROVIDER - NSDCCPCAREPLAN_GEN_ALL_CORE_FT
PRINCIPAL DISCHARGE DIAGNOSIS  Diagnosis: SAH (subarachnoid hemorrhage)  Assessment and Plan of Treatment: needs Head CT in two weeks, and f/u with Dr. Lopez neurosurgery

## 2021-08-01 NOTE — DISCHARGE NOTE PROVIDER - HOSPITAL COURSE
81 F transferred from outside hospital for SAH and Right wrist Fx after trip and fall.  Admitted to ICU 7/31.  Repeat CT head done on 8/1/21 reveals  stable bleed.  Downgraded from SICU to floor 8/2/21.  Pt. stable, tolerating diet, AOX3, voiding.  Upon discharge attending approved physical therapy. 81 F transferred from outside hospital for SAH and Right wrist Fx after trip and fall.  Admitted to ICU 7/31.  Repeat CT head done on 8/1/21 reveals  stable bleed.  Downgraded from SICU to floor 8/2/21.  Pt. stable, tolerating diet, AOX3, voiding.  Upon discharge attending approved physical therapy.  Aspirin may be resumed at 81mg as per neurosurgery.  (Pt. was on 325mg Qday)

## 2021-08-01 NOTE — DISCHARGE NOTE PROVIDER - NSDCFUSCHEDAPPT_GEN_ALL_CORE_FT
ADELFO KERR ; 09/13/2021 ; [facility] [address]  ADELFO KERR ; 09/30/2021 ; [facility] [address]  ADELFO KERR ; 11/01/2021 ; [facility]

## 2021-08-01 NOTE — DISCHARGE NOTE PROVIDER - NSDCFUADDINST_GEN_ALL_CORE_FT
Patient will maintain sugar tong splint right upper extremity, sling for comfort when ambulating. Patient will remain non weight bearing to the right upper extremity. Patient will follow up in 1 week in the office with Dr. Mays Patient will maintain sugar tong splint right upper extremity, sling for comfort when ambulating. Patient will remain non weight bearing to the right upper extremity. Patient will follow up in 1 week in the office with Dr. Tabatha Rivera. to return to ED for change in mental status, difficulty walking, dizziness, numbness, tingling, chest pain, SOB, or fever.

## 2021-08-01 NOTE — PATIENT PROFILE ADULT - MONEY FOR FOOD - DETAILS
patient states that she does worry about these issues at times but knows that her brother is always there when she needs his help

## 2021-08-01 NOTE — DISCHARGE NOTE PROVIDER - NSDCMRMEDTOKEN_GEN_ALL_CORE_FT
amLODIPine 10 mg oral tablet: 1 tab(s) orally once a day  Ecotrin 325 mg oral delayed release tablet: 1 tab(s) orally once a day  ferrous sulfate 325 mg (65 mg elemental iron) oral tablet: 1 tab(s) orally once a day  hydrALAZINE 25 mg oral tablet: 1 tab(s) orally 2 times a day  losartan 50 mg oral tablet: 1 tab(s) orally 2 times a day  metFORMIN 500 mg oral tablet, extended release: 1 tab(s) orally 2 times a day  omeprazole 20 mg oral delayed release capsule: 1 cap(s) orally once a day  prevagen:   sertraline 25 mg oral tablet: 1 tab(s) orally once a day  simvastatin 40 mg oral tablet: 1 tab(s) orally once a day (at bedtime)  Vitamin D3 1000 intl units oral tablet: 1 tab(s) orally once a day   acetaminophen 325 mg oral tablet: 2 tab(s) orally every 6 hours, As needed, Mild Pain (1 - 3)  amLODIPine 10 mg oral tablet: 1 tab(s) orally once a day  aspirin 81 mg oral delayed release tablet: 1 tab(s) orally once a day  ferrous sulfate 325 mg (65 mg elemental iron) oral tablet: 1 tab(s) orally once a day  hydrALAZINE 25 mg oral tablet: 1 tab(s) orally 2 times a day  ibuprofen 200 mg oral tablet: 2 tab(s) orally every 6 hours  losartan 50 mg oral tablet: 1 tab(s) orally 2 times a day  metFORMIN 500 mg oral tablet, extended release: 1 tab(s) orally 2 times a day  Multiple Vitamins oral tablet: 1 tab(s) orally once a day  omeprazole 20 mg oral delayed release capsule: 1 cap(s) orally once a day  prevagen:   sertraline 25 mg oral tablet: 1 tab(s) orally once a day  simvastatin 40 mg oral tablet: 1 tab(s) orally once a day (at bedtime)  Tylenol: 650 milligram(s) orally every 6 hours  Vitamin D3 1000 intl units oral tablet: 1 tab(s) orally once a day

## 2021-08-01 NOTE — CHART NOTE - NSCHARTNOTEFT_GEN_A_CORE
TERTIARY TRAUMA SURVEY  ------------------------------------------------------------------------------------------    Date/Time: 08-01-21 @ 01:31  Admit date:   Trauma activation:   Admit GCS:  15	E-  	V-  	M-      HPI:  transfer from outside hospital trauma b of subarachnoid hemorrhage after a fall.  pt has R wrist fracture reduction  Patient denies fevers/chills, denies lightheadedness/dizziness, denies SOB/chest pain, denies nausea/vomiting, denies constipation/diarrhea.  ***     (31 Jul 2021 22:14)      INTERVAL EVENTS: ***    PAST MEDICAL & SURGICAL HISTORY:  BP (high blood pressure)      [] No significant past history as reviewed with the patient and family    FAMILY HISTORY:    [] Family history not pertinent as reviewed with the patient and family    SOCIAL HISTORY: ***    ALLERGIES: Allergy Status Unknown      HOME MEDICATIONS: ***    CURRENT MEDICATIONS:   MEDICATIONS (STANDING): niCARdipine Infusion 5 mG/Hr IV Continuous <Continuous>  sodium chloride 0.9%. 1000 milliLiter(s) IV Continuous <Continuous>    MEDICATIONS (PRN):  ------------------------------------------------------------------------------------------    VITAL SIGNS  T(C): 37.2 (07-31-21 @ 23:00), Max: 37.2 (07-31-21 @ 23:00)  HR: 72 (08-01-21 @ 01:19) (56 - 76)  BP: 136/86 (08-01-21 @ 01:19) (115/79 - 156/68)  RR: 18 (08-01-21 @ 01:19) (18 - 20)  SpO2: 98% (08-01-21 @ 01:19) (95% - 98%)  CAPILLARY BLOOD GLUCOSE          INS/OUTS:    Drug Dosing Weight    Weight (kg): 76.9 (31 Jul 2021 22:02)    PHYSICAL EXAM:  ***  General: NAD, Sitting in bed comfortably  HEENT: NC/AT, EOMI, R ecchymosis on top of her R eye   Neck: Soft, supple  Cardio: RRR, nml S1/S2  Resp: Good effort, CTA b/l  Thorax: R chest wall and breast tenderness.   Breast: R breat tenderness.  GI/Abd: Soft, NT/ND, Rt lower abdomen tenderness  Vascular: Extremities warm, brisk cap refill, B/l radial pulses palpable, b/l DP/PT palpable, no palpable abdominal pulsatile mass  Skin: Intact, no breakdown  Lymphatic/Nodes: No palpable lymphadenopathy  Musculoskeletal: R wrist splint , tenderness of L forearm  ------------------------------------------------------------------------------------------    LABS  CBC (07-31 @ 22:27)                              13.0                           20.85<H>  )----------------(  424<H>     78.9<H>% Neutrophils, 11.4<L>% Lymphocytes, ANC: 16.83<H>                              39.5      BMP (07-31 @ 22:27)             137     |  103     |  18.5  		Ca++ --      Ca 9.0                ---------------------------------( 150<H>		Mg --                 3.6     |  18.0<L>  |  0.97  			Ph --        LFTs (07-31 @ 22:27)      TPro 7.0 / Alb 3.8 / TBili 0.3<L> / DBili -- / AST 24 / ALT 10 / AlkPhos 89    Coags (07-31 @ 22:27)  aPTT 23.9<L> / INR 1.06 / PT 12.3        VBG (07-31 @ 22:31)     -- / -- / -- / -- / -- / --%     Lactate: 2.40<H>      MICROBIOLOGY      ------------------------------------------------------------------------------------------      List Injuries Identified to Date:  ***  SAH (subarachnoid hemorrhage)        List Operative and Interventional Radiological Procedures:  ***      Consults (Date):  ***  [] Neurosurgery  7/31  [] plastic surgery Surgery  7/31    80 Y F trauma b transferred from outside hospital s/p fall complicated with subarachnoid hemorrhage, and R wrist fracture. c callor cleared     Plan:   F/U Neurosurgery   Admit to SICU   F/U Ortho   Monitor hemodynamic   neurocheck

## 2021-08-01 NOTE — DISCHARGE NOTE PROVIDER - PROVIDER TOKENS
PROVIDER:[TOKEN:[[id number]]] PROVIDER:[TOKEN:[[id number]]],PROVIDER:[TOKEN:[[id number]]]

## 2021-08-02 LAB
A1C WITH ESTIMATED AVERAGE GLUCOSE RESULT: 6 % — HIGH (ref 4–5.6)
ANION GAP SERPL CALC-SCNC: 10 MMOL/L — SIGNIFICANT CHANGE UP (ref 5–17)
BASOPHILS # BLD AUTO: 0.06 K/UL — SIGNIFICANT CHANGE UP (ref 0–0.2)
BASOPHILS NFR BLD AUTO: 0.6 % — SIGNIFICANT CHANGE UP (ref 0–2)
BUN SERPL-MCNC: 14.5 MG/DL — SIGNIFICANT CHANGE UP (ref 8–20)
CALCIUM SERPL-MCNC: 8.1 MG/DL — LOW (ref 8.6–10.2)
CHLORIDE SERPL-SCNC: 106 MMOL/L — SIGNIFICANT CHANGE UP (ref 98–107)
CO2 SERPL-SCNC: 23 MMOL/L — SIGNIFICANT CHANGE UP (ref 22–29)
CREAT SERPL-MCNC: 0.87 MG/DL — SIGNIFICANT CHANGE UP (ref 0.5–1.3)
EOSINOPHIL # BLD AUTO: 0.21 K/UL — SIGNIFICANT CHANGE UP (ref 0–0.5)
EOSINOPHIL NFR BLD AUTO: 1.9 % — SIGNIFICANT CHANGE UP (ref 0–6)
ESTIMATED AVERAGE GLUCOSE: 126 MG/DL — HIGH (ref 68–114)
GLUCOSE BLDC GLUCOMTR-MCNC: 106 MG/DL — HIGH (ref 70–99)
GLUCOSE BLDC GLUCOMTR-MCNC: 119 MG/DL — HIGH (ref 70–99)
GLUCOSE BLDC GLUCOMTR-MCNC: 122 MG/DL — HIGH (ref 70–99)
GLUCOSE BLDC GLUCOMTR-MCNC: 135 MG/DL — HIGH (ref 70–99)
GLUCOSE SERPL-MCNC: 125 MG/DL — HIGH (ref 70–99)
HCT VFR BLD CALC: 33.8 % — LOW (ref 34.5–45)
HGB BLD-MCNC: 10.7 G/DL — LOW (ref 11.5–15.5)
IMM GRANULOCYTES NFR BLD AUTO: 0.5 % — SIGNIFICANT CHANGE UP (ref 0–1.5)
LYMPHOCYTES # BLD AUTO: 1.59 K/UL — SIGNIFICANT CHANGE UP (ref 1–3.3)
LYMPHOCYTES # BLD AUTO: 14.7 % — SIGNIFICANT CHANGE UP (ref 13–44)
MAGNESIUM SERPL-MCNC: 2.2 MG/DL — SIGNIFICANT CHANGE UP (ref 1.6–2.6)
MCHC RBC-ENTMCNC: 29 PG — SIGNIFICANT CHANGE UP (ref 27–34)
MCHC RBC-ENTMCNC: 31.7 GM/DL — LOW (ref 32–36)
MCV RBC AUTO: 91.6 FL — SIGNIFICANT CHANGE UP (ref 80–100)
MONOCYTES # BLD AUTO: 1.17 K/UL — HIGH (ref 0–0.9)
MONOCYTES NFR BLD AUTO: 10.8 % — SIGNIFICANT CHANGE UP (ref 2–14)
NEUTROPHILS # BLD AUTO: 7.72 K/UL — HIGH (ref 1.8–7.4)
NEUTROPHILS NFR BLD AUTO: 71.5 % — SIGNIFICANT CHANGE UP (ref 43–77)
PHOSPHATE SERPL-MCNC: 3.5 MG/DL — SIGNIFICANT CHANGE UP (ref 2.4–4.7)
PLATELET # BLD AUTO: 322 K/UL — SIGNIFICANT CHANGE UP (ref 150–400)
POTASSIUM SERPL-MCNC: 4.2 MMOL/L — SIGNIFICANT CHANGE UP (ref 3.5–5.3)
POTASSIUM SERPL-SCNC: 4.2 MMOL/L — SIGNIFICANT CHANGE UP (ref 3.5–5.3)
RBC # BLD: 3.69 M/UL — LOW (ref 3.8–5.2)
RBC # FLD: 13.4 % — SIGNIFICANT CHANGE UP (ref 10.3–14.5)
SODIUM SERPL-SCNC: 139 MMOL/L — SIGNIFICANT CHANGE UP (ref 135–145)
WBC # BLD: 10.8 K/UL — HIGH (ref 3.8–10.5)
WBC # FLD AUTO: 10.8 K/UL — HIGH (ref 3.8–10.5)

## 2021-08-02 PROCEDURE — 99232 SBSQ HOSP IP/OBS MODERATE 35: CPT

## 2021-08-02 RX ORDER — POLYETHYLENE GLYCOL 3350 17 G/17G
17 POWDER, FOR SOLUTION ORAL DAILY
Refills: 0 | Status: DISCONTINUED | OUTPATIENT
Start: 2021-08-02 | End: 2021-08-04

## 2021-08-02 RX ORDER — LOSARTAN POTASSIUM 100 MG/1
50 TABLET, FILM COATED ORAL DAILY
Refills: 0 | Status: DISCONTINUED | OUTPATIENT
Start: 2021-08-02 | End: 2021-08-04

## 2021-08-02 RX ORDER — AMLODIPINE BESYLATE 2.5 MG/1
10 TABLET ORAL DAILY
Refills: 0 | Status: DISCONTINUED | OUTPATIENT
Start: 2021-08-02 | End: 2021-08-04

## 2021-08-02 RX ORDER — OXYCODONE HYDROCHLORIDE 5 MG/1
5 TABLET ORAL EVERY 4 HOURS
Refills: 0 | Status: DISCONTINUED | OUTPATIENT
Start: 2021-08-02 | End: 2021-08-04

## 2021-08-02 RX ORDER — ENOXAPARIN SODIUM 100 MG/ML
40 INJECTION SUBCUTANEOUS DAILY
Refills: 0 | Status: DISCONTINUED | OUTPATIENT
Start: 2021-08-02 | End: 2021-08-04

## 2021-08-02 RX ADMIN — Medication 25 MILLIGRAM(S): at 14:57

## 2021-08-02 RX ADMIN — Medication 2000 UNIT(S): at 11:03

## 2021-08-02 RX ADMIN — POLYETHYLENE GLYCOL 3350 17 GRAM(S): 17 POWDER, FOR SOLUTION ORAL at 11:04

## 2021-08-02 RX ADMIN — OXYCODONE HYDROCHLORIDE 5 MILLIGRAM(S): 5 TABLET ORAL at 22:13

## 2021-08-02 RX ADMIN — LIDOCAINE 1 PATCH: 4 CREAM TOPICAL at 00:00

## 2021-08-02 RX ADMIN — Medication 3 MILLIGRAM(S): at 22:13

## 2021-08-02 RX ADMIN — LIDOCAINE 1 PATCH: 4 CREAM TOPICAL at 20:00

## 2021-08-02 RX ADMIN — OXYCODONE HYDROCHLORIDE 5 MILLIGRAM(S): 5 TABLET ORAL at 07:24

## 2021-08-02 RX ADMIN — ATORVASTATIN CALCIUM 40 MILLIGRAM(S): 80 TABLET, FILM COATED ORAL at 22:15

## 2021-08-02 RX ADMIN — LIDOCAINE 1 PATCH: 4 CREAM TOPICAL at 00:36

## 2021-08-02 RX ADMIN — AMLODIPINE BESYLATE 10 MILLIGRAM(S): 2.5 TABLET ORAL at 08:16

## 2021-08-02 RX ADMIN — LIDOCAINE 1 PATCH: 4 CREAM TOPICAL at 11:02

## 2021-08-02 RX ADMIN — LOSARTAN POTASSIUM 50 MILLIGRAM(S): 100 TABLET, FILM COATED ORAL at 11:03

## 2021-08-02 RX ADMIN — Medication 1 TABLET(S): at 11:03

## 2021-08-02 RX ADMIN — CHLORHEXIDINE GLUCONATE 1 APPLICATION(S): 213 SOLUTION TOPICAL at 05:56

## 2021-08-02 RX ADMIN — SERTRALINE 25 MILLIGRAM(S): 25 TABLET, FILM COATED ORAL at 11:02

## 2021-08-02 RX ADMIN — Medication 25 MILLIGRAM(S): at 05:56

## 2021-08-02 RX ADMIN — PANTOPRAZOLE SODIUM 40 MILLIGRAM(S): 20 TABLET, DELAYED RELEASE ORAL at 06:00

## 2021-08-02 RX ADMIN — Medication 25 MILLIGRAM(S): at 22:14

## 2021-08-02 RX ADMIN — Medication 325 MILLIGRAM(S): at 11:03

## 2021-08-02 RX ADMIN — OXYCODONE HYDROCHLORIDE 5 MILLIGRAM(S): 5 TABLET ORAL at 05:56

## 2021-08-02 RX ADMIN — ENOXAPARIN SODIUM 40 MILLIGRAM(S): 100 INJECTION SUBCUTANEOUS at 11:03

## 2021-08-02 RX ADMIN — OXYCODONE HYDROCHLORIDE 5 MILLIGRAM(S): 5 TABLET ORAL at 22:45

## 2021-08-02 NOTE — PHYSICAL THERAPY INITIAL EVALUATION ADULT - ACTIVE RANGE OF MOTION EXAMINATION, REHAB EVAL
assessed during functional mobility, resistance not applied, but right UE due to injury/Left UE Active ROM was WFL (within functional limits)/bilateral  lower extremity Active ROM was WFL (within functional limits)

## 2021-08-02 NOTE — CHART NOTE - NSCHARTNOTEFT_GEN_A_CORE
Patient downgraded from SICU. no acute events, feeling well.     MEDICATIONS  (STANDING):  amLODIPine   Tablet 10 milliGRAM(s) Oral daily  atorvastatin 40 milliGRAM(s) Oral at bedtime  chlorhexidine 2% Cloths 1 Application(s) Topical <User Schedule>  cholecalciferol 2000 Unit(s) Oral daily  enoxaparin Injectable 40 milliGRAM(s) SubCutaneous daily  ferrous    sulfate 325 milliGRAM(s) Oral daily  hydrALAZINE 25 milliGRAM(s) Oral three times a day  insulin lispro (ADMELOG) corrective regimen sliding scale   SubCutaneous four times a day before meals  lidocaine   4% Patch 1 Patch Transdermal every 24 hours  losartan 50 milliGRAM(s) Oral daily  melatonin 3 milliGRAM(s) Oral at bedtime  multivitamin 1 Tablet(s) Oral daily  pantoprazole    Tablet 40 milliGRAM(s) Oral before breakfast  polyethylene glycol 3350 17 Gram(s) Oral daily  sertraline 25 milliGRAM(s) Oral daily    MEDICATIONS  (PRN):  acetaminophen   Tablet .. 650 milliGRAM(s) Oral every 6 hours PRN Mild Pain (1 - 3)  oxyCODONE    IR 2.5 milliGRAM(s) Oral every 4 hours PRN Moderate Pain (4 - 6)  oxyCODONE    IR 5 milliGRAM(s) Oral every 4 hours PRN Severe Pain (7 - 10)      Vital Signs Last 24 Hrs  T(C): 36.9 (02 Aug 2021 16:45), Max: 36.9 (02 Aug 2021 16:45)  T(F): 98.4 (02 Aug 2021 16:45), Max: 98.4 (02 Aug 2021 16:45)  HR: 72 (02 Aug 2021 16:45) (51 - 72)  BP: 166/82 (02 Aug 2021 16:45) (106/88 - 168/63)  BP(mean): 86 (02 Aug 2021 13:00) (71 - 94)  RR: 18 (02 Aug 2021 16:45) (11 - 23)  SpO2: 93% (02 Aug 2021 16:45) (92% - 100%)    Gen: no acute distress  HEENT: R eye ecchymosis  Pulm: no increased WOB  CV: +s1/s2  GI: abd soft  MSK: R arm in slight    I&O's Detail    01 Aug 2021 07:01  -  02 Aug 2021 07:00  --------------------------------------------------------  IN:    IV PiggyBack: 100 mL    NiCARdipine: 5 mL    Oral Fluid: 1060 mL    sodium chloride 0.9%: 1350 mL  Total IN: 2515 mL    OUT:    Voided (mL): 1800 mL  Total OUT: 1800 mL    Total NET: 715 mL      02 Aug 2021 07:01  -  02 Aug 2021 17:37  --------------------------------------------------------  IN:    Oral Fluid: 480 mL  Total IN: 480 mL    OUT:    Voided (mL): 100 mL  Total OUT: 100 mL    Total NET: 380 mL          LABS:                        10.7   10.80 )-----------( 322      ( 02 Aug 2021 04:29 )             33.8     08-02    139  |  106  |  14.5  ----------------------------<  125<H>  4.2   |  23.0  |  0.87    Ca    8.1<L>      02 Aug 2021 04:29  Phos  3.5     08-02  Mg     2.2     08-02    TPro  6.6  /  Alb  3.5  /  TBili  0.4  /  DBili  x   /  AST  16  /  ALT  10  /  AlkPhos  89  08-01    PT/INR - ( 31 Jul 2021 22:27 )   PT: 12.3 sec;   INR: 1.06 ratio         PTT - ( 31 Jul 2021 22:27 )  PTT:23.9 sec    81F s/p trip and fall w/SAH and R wrist fx s/p splint, repeat CT head stable, downgraded from SICU    -FU PT/OT  -FU ortho as outpatient

## 2021-08-02 NOTE — CHART NOTE - NSCHARTNOTEFT_GEN_A_CORE
SICU TRANSFER NOTE  -----------------------------  ICU Admission Date: 8/1/21  Transfer Date: 08-02-21 @ 11:09    Admission Diagnosis: SDH, R wrist fx    Active Problems/injuries: SDH, R wrist fx    Procedures: R wrist splinted in trauma bay by Ortho (7/31)    Consultants:  Neurosurgery  orthopedics    Medications  acetaminophen   Tablet .. 650 milliGRAM(s) Oral every 6 hours PRN  amLODIPine   Tablet 10 milliGRAM(s) Oral daily  atorvastatin 40 milliGRAM(s) Oral at bedtime  chlorhexidine 2% Cloths 1 Application(s) Topical <User Schedule>  cholecalciferol 2000 Unit(s) Oral daily  enoxaparin Injectable 40 milliGRAM(s) SubCutaneous daily  ferrous    sulfate 325 milliGRAM(s) Oral daily  hydrALAZINE 25 milliGRAM(s) Oral three times a day  insulin lispro (ADMELOG) corrective regimen sliding scale   SubCutaneous four times a day before meals  lidocaine   4% Patch 1 Patch Transdermal every 24 hours  losartan 50 milliGRAM(s) Oral daily  melatonin 3 milliGRAM(s) Oral at bedtime  multivitamin 1 Tablet(s) Oral daily  oxyCODONE    IR 2.5 milliGRAM(s) Oral every 4 hours PRN  oxyCODONE    IR 5 milliGRAM(s) Oral every 4 hours PRN  pantoprazole    Tablet 40 milliGRAM(s) Oral before breakfast  polyethylene glycol 3350 17 Gram(s) Oral daily  sertraline 25 milliGRAM(s) Oral daily      [x] I attest I have reviewed and reconciled all medications prior to transfer    IV Fluids  sodium chloride 0.9%.: Solution, 1000 milliLiter(s) infuse at 75 mL/Hr, Stop After 1 Days  Provider's Contact #: [phone]    Indication: hydration    Antibiotics: none    I have discussed this case with [email or web address] [email or web address] upon transfer and all questions regarding ICU course were answered.  The following items are to be followed up:  - Q4 hour neurochecks  - PT/OT (wrist fx to patient's dominant hand)  - home medications restarted w/ the exception of ecotrin and metformin. Re-start as appropriate.   - Started Lovenox for DVT ppx this AM (8/2) SICU TRANSFER NOTE  -----------------------------  ICU Admission Date: 8/1/21  Transfer Date: 08-02-21 @ 11:09    Admission Diagnosis: SDH, R wrist fx    Active Problems/injuries: SDH, R wrist fx    Procedures: R wrist splinted in trauma bay by Ortho (7/31)    Consultants:  Neurosurgery  orthopedics    Medications  acetaminophen   Tablet .. 650 milliGRAM(s) Oral every 6 hours PRN  amLODIPine   Tablet 10 milliGRAM(s) Oral daily  atorvastatin 40 milliGRAM(s) Oral at bedtime  chlorhexidine 2% Cloths 1 Application(s) Topical <User Schedule>  cholecalciferol 2000 Unit(s) Oral daily  enoxaparin Injectable 40 milliGRAM(s) SubCutaneous daily  ferrous    sulfate 325 milliGRAM(s) Oral daily  hydrALAZINE 25 milliGRAM(s) Oral three times a day  insulin lispro (ADMELOG) corrective regimen sliding scale   SubCutaneous four times a day before meals  lidocaine   4% Patch 1 Patch Transdermal every 24 hours  losartan 50 milliGRAM(s) Oral daily  melatonin 3 milliGRAM(s) Oral at bedtime  multivitamin 1 Tablet(s) Oral daily  oxyCODONE    IR 2.5 milliGRAM(s) Oral every 4 hours PRN  oxyCODONE    IR 5 milliGRAM(s) Oral every 4 hours PRN  pantoprazole    Tablet 40 milliGRAM(s) Oral before breakfast  polyethylene glycol 3350 17 Gram(s) Oral daily  sertraline 25 milliGRAM(s) Oral daily      [x] I attest I have reviewed and reconciled all medications prior to transfer    IV Fluids  sodium chloride 0.9%.: Solution, 1000 milliLiter(s) infuse at 75 mL/Hr, Stop After 1 Days  Provider's Contact #: [phone]    Indication: hydration    Antibiotics: none    I have discussed this case with MD Stock upon transfer and all questions regarding ICU course were answered.  The following items are to be followed up:  - Q4 hour neurochecks  - PT/OT (wrist fx to patient's dominant hand)  - home medications restarted w/ the exception of ecotrin and metformin. Re-start as appropriate.   - Started Lovenox for DVT ppx this AM (8/2)

## 2021-08-02 NOTE — PHYSICAL THERAPY INITIAL EVALUATION ADULT - DIAGNOSIS, PT EVAL
Impaired Functional Mobility due to generalized weakness, (+) Comminuted intra-articular distal radial fracture, it is angulated with the apex directed volar.

## 2021-08-02 NOTE — PHYSICAL THERAPY INITIAL EVALUATION ADULT - GENERAL OBSERVATIONS, REHAB EVAL
Pt received in the francis chair next to bed, (+) right UE sling, and support on pillow times 1, NAD.

## 2021-08-02 NOTE — PROGRESS NOTE ADULT - SUBJECTIVE AND OBJECTIVE BOX
24h Events: Patient was seen and examined. Resting comfortably in bed with no acute events or complaints overnight. Had increased pain yesterday and patient was amenable to taking stronger pain meds than Tylenol. Pain is now well controlled w/ multi-modal pain regimen. pic remains 7-8. Had stable Head CT yesterday. Neuro checks decreased to q2h overnight. Tolerating PO diet well and IVF stopped. SBP into 160's overnight and home Hydralazine re-started w/ positive results. Denies HA, fever, chills, cough, chest pain, SOB, abdominal pain, N/V.    ICU Vital Signs Last 24 Hrs  T(C): 36.8 (02 Aug 2021 04:00), Max: 36.8 (01 Aug 2021 07:27)  T(F): 98.2 (02 Aug 2021 04:00), Max: 98.3 (01 Aug 2021 07:27)  HR: 52 (02 Aug 2021 04:00) (51 - 85)  BP: 142/64 (02 Aug 2021 04:00) (95/54 - 166/72)  BP(mean): 85 (02 Aug 2021 04:00) (68 - 98)  ABP: --  ABP(mean): --  RR: 11 (02 Aug 2021 04:00) (11 - 23)  SpO2: 97% (02 Aug 2021 04:00) (90% - 98%)      I&O's Detail    31 Jul 2021 07:01  -  01 Aug 2021 07:00  --------------------------------------------------------  IN:    NiCARdipine: 52.5 mL    sodium chloride 0.9%: 300 mL  Total IN: 352.5 mL    OUT:    Voided (mL): 300 mL  Total OUT: 300 mL    Total NET: 52.5 mL      01 Aug 2021 07:01  -  02 Aug 2021 04:42  --------------------------------------------------------  IN:    IV PiggyBack: 100 mL    NiCARdipine: 5 mL    Oral Fluid: 940 mL    sodium chloride 0.9%: 1350 mL  Total IN: 2395 mL    OUT:    Voided (mL): 1400 mL  Total OUT: 1400 mL    Total NET: 995 mL      ABG - ( 01 Aug 2021 12:25 )  pH, Arterial: 7.450 pH, Blood: x     /  pCO2: 36    /  pO2: 80    / HCO3: 25    / Base Excess: 1.0   /  SaO2: 98.0        MEDICATIONS  (STANDING):  atorvastatin 40 milliGRAM(s) Oral at bedtime  chlorhexidine 2% Cloths 1 Application(s) Topical <User Schedule>  cholecalciferol 2000 Unit(s) Oral daily  ferrous    sulfate 325 milliGRAM(s) Oral daily  hydrALAZINE 25 milliGRAM(s) Oral three times a day  insulin lispro (ADMELOG) corrective regimen sliding scale   SubCutaneous four times a day before meals  lidocaine   4% Patch 1 Patch Transdermal every 24 hours  melatonin 3 milliGRAM(s) Oral at bedtime  multivitamin 1 Tablet(s) Oral daily  pantoprazole    Tablet 40 milliGRAM(s) Oral before breakfast  sertraline 25 milliGRAM(s) Oral daily  sodium chloride 0.9%. 1000 milliLiter(s) (75 mL/Hr) IV Continuous <Continuous>    MEDICATIONS  (PRN):  acetaminophen   Tablet .. 650 milliGRAM(s) Oral every 6 hours PRN Mild Pain (1 - 3)  oxyCODONE    IR 2.5 milliGRAM(s) Oral every 4 hours PRN Moderate Pain (4 - 6)  oxyCODONE    IR 10 milliGRAM(s) Oral every 4 hours PRN Severe Pain (7 - 10)    Physical Exam:    Gen: Resting comfortably in bed    HEENT: PERRL, EOMI    Neurological: Alert and oriented x3 without focal deficit    Neck: Trachea midline, no evidence of JVD, FROM without pain, neck symmetric    Pulmonary: CTAB with decreased breath sounds at the bases    Cardiovascular: S1S2, intermittently bradycardic (asymptomatic)    Gastrointestinal: Soft, non-tender, non-distended    : voiding via primafit    Extremities: R wrist immobilized in plaster splint    Skin: R forehead ecchymosis    Musculoskeletal: L hand pain, R wrist immobilized in plaster splint, R chest wall pain    LABS:  CBC Full  -  ( 01 Aug 2021 06:34 )  WBC Count : 13.39 K/uL  RBC Count : 4.09 M/uL  Hemoglobin : 11.8 g/dL  Hematocrit : 36.8 %  Platelet Count - Automated : 373 K/uL  Mean Cell Volume : 90.0 fl  Mean Cell Hemoglobin : 28.9 pg  Mean Cell Hemoglobin Concentration : 32.1 gm/dL  Auto Neutrophil # : 10.20 K/uL  Auto Lymphocyte # : 1.80 K/uL  Auto Monocyte # : 1.22 K/uL  Auto Eosinophil # : 0.05 K/uL  Auto Basophil # : 0.05 K/uL  Auto Neutrophil % : 76.2 %  Auto Lymphocyte % : 13.4 %  Auto Monocyte % : 9.1 %  Auto Eosinophil % : 0.4 %  Auto Basophil % : 0.4 %    08-01    139  |  103  |  12.6  ----------------------------<  136<H>  3.6   |  23.0  |  0.97    Ca    8.9      01 Aug 2021 21:55  Phos  2.4     08-01  Mg     2.4     08-01    TPro  6.6  /  Alb  3.5  /  TBili  0.4  /  DBili  x   /  AST  16  /  ALT  10  /  AlkPhos  89  08-01    PT/INR - ( 31 Jul 2021 22:27 )   PT: 12.3 sec;   INR: 1.06 ratio         PTT - ( 31 Jul 2021 22:27 )  PTT:23.9 sec    RECENT CULTURES:      LIVER FUNCTIONS - ( 01 Aug 2021 06:34 )  Alb: 3.5 g/dL / Pro: 6.6 g/dL / ALK PHOS: 89 U/L / ALT: 10 U/L / AST: 16 U/L / GGT: x

## 2021-08-02 NOTE — PHYSICAL THERAPY INITIAL EVALUATION ADULT - ADDITIONAL COMMENTS
as per pt: resides in the private house with 3-4 steps to enter, (+) rail, (+) driving, (-) DME, Son available to assist as needed upon D/C home, denies history of falls

## 2021-08-02 NOTE — DIETITIAN INITIAL EVALUATION ADULT. - PERTINENT LABORATORY DATA
08-02 Na139 mmol/L Glu 125 mg/dL<H> K+ 4.2 mmol/L Cr  0.87 mg/dL BUN 14.5 mg/dL Phos 3.5 mg/dL Alb n/a   PAB n/a

## 2021-08-02 NOTE — PROGRESS NOTE ADULT - SUBJECTIVE AND OBJECTIVE BOX
HPI:  79 y/o female transfered from [facility] for evaluation of tSAH s/p fall.       INTERVAL HPI/OVERNIGHT EVENTS:  No events overnight, No headaches     Vital Signs Last 24 Hrs  T(C): 36.7 (02 Aug 2021 08:00), Max: 36.8 (01 Aug 2021 11:23)  T(F): 98.1 (02 Aug 2021 08:00), Max: 98.3 (01 Aug 2021 15:26)  HR: 62 (02 Aug 2021 08:00) (51 - 69)  BP: 145/65 (02 Aug 2021 08:00) (95/54 - 168/63)  BP(mean): 86 (02 Aug 2021 08:00) (68 - 98)  RR: 14 (02 Aug 2021 08:00) (11 - 23)  SpO2: 96% (02 Aug 2021 08:00) (90% - 98%)    PHYSICAL EXAM:  GENERAL: NAD, well-groomed, well-developed  HEAD: R periorbital ecchymosis   MENTAL STATUS: AAO x3; Awake/Comatose; Opens eyes spontaneously; Appropriately conversant without aphasia; following simple commands  SOLORZANO with exc  SENSATION: grossly intact to light touch all extremities  COORDINATION: Gait intact; rapid alternating movements intact; heel to shin intact; no upper extremity dysmetria  CHEST/LUNG: Clear to auscultation bilaterally; no rales, rhonchi, wheezing, or rubs  HEART: +S1/+S2; Regular rate and rhythm; no murmurs, rubs, or gallops  ABDOMEN: Soft, nontender, nondistended; bowel sounds present all four quadrants  EXTREMITIES:  2+ peripheral pulses, no clubbing, cyanosis, or edema  SKIN: Warm, dry; no rashes or lesions    LABS:                        10.7   10.80 )-----------( 322      ( 02 Aug 2021 04:29 )             33.8     08-02    139  |  106  |  14.5  ----------------------------<  125<H>  4.2   |  23.0  |  0.87    Ca    8.1<L>      02 Aug 2021 04:29  Phos  3.5     08-02  Mg     2.2     08-02    TPro  6.6  /  Alb  3.5  /  TBili  0.4  /  DBili  x   /  AST  16  /  ALT  10  /  AlkPhos  89  08-01    PT/INR - ( 31 Jul 2021 22:27 )   PT: 12.3 sec;   INR: 1.06 ratio         PTT - ( 31 Jul 2021 22:27 )  PTT:23.9 sec      08-01 @ 07:01  -  08-02 @ 07:00  --------------------------------------------------------  IN: 2515 mL / OUT: 1800 mL / NET: 715 mL        RADIOLOGY & ADDITIONAL TESTS:          CAPRINI SCORE [CLOT]:  Patient has an estimated Caprini score of greater than 5.  However, the patient's unique clinical situation will be addressed in an individual manner to determine appropriate anticoagulation treatment, if any.   HPI:  81 y/o female transfered from [facility] for evaluation of tSAH s/p fall.       INTERVAL HPI/OVERNIGHT EVENTS:  No events overnight, No headaches     Vital Signs Last 24 Hrs  T(C): 36.7 (02 Aug 2021 08:00), Max: 36.8 (01 Aug 2021 11:23)  T(F): 98.1 (02 Aug 2021 08:00), Max: 98.3 (01 Aug 2021 15:26)  HR: 62 (02 Aug 2021 08:00) (51 - 69)  BP: 145/65 (02 Aug 2021 08:00) (95/54 - 168/63)  BP(mean): 86 (02 Aug 2021 08:00) (68 - 98)  RR: 14 (02 Aug 2021 08:00) (11 - 23)  SpO2: 96% (02 Aug 2021 08:00) (90% - 98%)    PHYSICAL EXAM:  GENERAL: NAD, well-groomed, well-developed  HEAD: R periorbital ecchymosis   MENTAL STATUS: AAO x3; ; Opens eyes spontaneously; Appropriately conversant without aphasia; following simple commands  SOLORZANO; LUE limited 2/2 pain; RUE casted able to wiggle fingers; b/l LE 5/5 strength  CHEST/LUNG: Clear to auscultation bilaterally  HEART: +S1/+S2; Regular rate and rhythm  ABDOMEN: Soft, nontender, nondistended  EXTREMITIES:  2+ peripheral pulses  SKIN: Warm, dry; no rashes or lesions    LABS:                        10.7   10.80 )-----------( 322      ( 02 Aug 2021 04:29 )             33.8     08-02    139  |  106  |  14.5  ----------------------------<  125<H>  4.2   |  23.0  |  0.87    Ca    8.1<L>      02 Aug 2021 04:29  Phos  3.5     08-02  Mg     2.2     08-02    TPro  6.6  /  Alb  3.5  /  TBili  0.4  /  DBili  x   /  AST  16  /  ALT  10  /  AlkPhos  89  08-01    PT/INR - ( 31 Jul 2021 22:27 )   PT: 12.3 sec;   INR: 1.06 ratio         PTT - ( 31 Jul 2021 22:27 )  PTT:23.9 sec      08-01 @ 07:01  -  08-02 @ 07:00  --------------------------------------------------------  IN: 2515 mL / OUT: 1800 mL / NET: 715 mL        RADIOLOGY & ADDITIONAL TESTS:          CAPRINI SCORE [CLOT]:  Patient has an estimated Caprini score of greater than 5.  However, the patient's unique clinical situation will be addressed in an individual manner to determine appropriate anticoagulation treatment, if any.

## 2021-08-02 NOTE — DIETITIAN INITIAL EVALUATION ADULT. - OTHER INFO
Pt is a 80 year old female transferred from [facility] after sustaining a SAH and a R wrist fracture after a fall from standing. Pt with a metabolic acidosis on admission secondary to mild lactic acidosis which has since improved.   Pt reports eating very well PTA, denies any recent weight changes. Pt denies difficulty chewing or swallowing. Pt currently with good PO intake and appetite. Encouraged HBV protein foods to promote healing. Pt with good understanding and expected compliance.

## 2021-08-03 DIAGNOSIS — S62.101A FRACTURE OF UNSPECIFIED CARPAL BONE, RIGHT WRIST, INITIAL ENCOUNTER FOR CLOSED FRACTURE: ICD-10-CM

## 2021-08-03 DIAGNOSIS — I60.9 NONTRAUMATIC SUBARACHNOID HEMORRHAGE, UNSPECIFIED: ICD-10-CM

## 2021-08-03 LAB
ANION GAP SERPL CALC-SCNC: 11 MMOL/L — SIGNIFICANT CHANGE UP (ref 5–17)
BASOPHILS # BLD AUTO: 0.08 K/UL — SIGNIFICANT CHANGE UP (ref 0–0.2)
BASOPHILS NFR BLD AUTO: 0.7 % — SIGNIFICANT CHANGE UP (ref 0–2)
BUN SERPL-MCNC: 13.5 MG/DL — SIGNIFICANT CHANGE UP (ref 8–20)
CALCIUM SERPL-MCNC: 8.6 MG/DL — SIGNIFICANT CHANGE UP (ref 8.6–10.2)
CHLORIDE SERPL-SCNC: 103 MMOL/L — SIGNIFICANT CHANGE UP (ref 98–107)
CO2 SERPL-SCNC: 23 MMOL/L — SIGNIFICANT CHANGE UP (ref 22–29)
CREAT SERPL-MCNC: 0.74 MG/DL — SIGNIFICANT CHANGE UP (ref 0.5–1.3)
EOSINOPHIL # BLD AUTO: 0.42 K/UL — SIGNIFICANT CHANGE UP (ref 0–0.5)
EOSINOPHIL NFR BLD AUTO: 3.7 % — SIGNIFICANT CHANGE UP (ref 0–6)
GLUCOSE BLDC GLUCOMTR-MCNC: 114 MG/DL — HIGH (ref 70–99)
GLUCOSE BLDC GLUCOMTR-MCNC: 126 MG/DL — HIGH (ref 70–99)
GLUCOSE BLDC GLUCOMTR-MCNC: 136 MG/DL — HIGH (ref 70–99)
GLUCOSE BLDC GLUCOMTR-MCNC: 148 MG/DL — HIGH (ref 70–99)
GLUCOSE SERPL-MCNC: 129 MG/DL — HIGH (ref 70–99)
HCT VFR BLD CALC: 34.4 % — LOW (ref 34.5–45)
HGB BLD-MCNC: 11.1 G/DL — LOW (ref 11.5–15.5)
IMM GRANULOCYTES NFR BLD AUTO: 0.6 % — SIGNIFICANT CHANGE UP (ref 0–1.5)
LYMPHOCYTES # BLD AUTO: 18.1 % — SIGNIFICANT CHANGE UP (ref 13–44)
LYMPHOCYTES # BLD AUTO: 2.07 K/UL — SIGNIFICANT CHANGE UP (ref 1–3.3)
MAGNESIUM SERPL-MCNC: 1.9 MG/DL — SIGNIFICANT CHANGE UP (ref 1.8–2.6)
MCHC RBC-ENTMCNC: 29.2 PG — SIGNIFICANT CHANGE UP (ref 27–34)
MCHC RBC-ENTMCNC: 32.3 GM/DL — SIGNIFICANT CHANGE UP (ref 32–36)
MCV RBC AUTO: 90.5 FL — SIGNIFICANT CHANGE UP (ref 80–100)
MONOCYTES # BLD AUTO: 1.46 K/UL — HIGH (ref 0–0.9)
MONOCYTES NFR BLD AUTO: 12.8 % — SIGNIFICANT CHANGE UP (ref 2–14)
NEUTROPHILS # BLD AUTO: 7.31 K/UL — SIGNIFICANT CHANGE UP (ref 1.8–7.4)
NEUTROPHILS NFR BLD AUTO: 64.1 % — SIGNIFICANT CHANGE UP (ref 43–77)
PHOSPHATE SERPL-MCNC: 2.9 MG/DL — SIGNIFICANT CHANGE UP (ref 2.4–4.7)
PLATELET # BLD AUTO: 332 K/UL — SIGNIFICANT CHANGE UP (ref 150–400)
POTASSIUM SERPL-MCNC: 3.9 MMOL/L — SIGNIFICANT CHANGE UP (ref 3.5–5.3)
POTASSIUM SERPL-SCNC: 3.9 MMOL/L — SIGNIFICANT CHANGE UP (ref 3.5–5.3)
RBC # BLD: 3.8 M/UL — SIGNIFICANT CHANGE UP (ref 3.8–5.2)
RBC # FLD: 13.4 % — SIGNIFICANT CHANGE UP (ref 10.3–14.5)
SODIUM SERPL-SCNC: 137 MMOL/L — SIGNIFICANT CHANGE UP (ref 135–145)
WBC # BLD: 11.41 K/UL — HIGH (ref 3.8–10.5)
WBC # FLD AUTO: 11.41 K/UL — HIGH (ref 3.8–10.5)

## 2021-08-03 PROCEDURE — 99232 SBSQ HOSP IP/OBS MODERATE 35: CPT

## 2021-08-03 RX ORDER — IBUPROFEN 200 MG
2 TABLET ORAL
Qty: 0 | Refills: 0 | DISCHARGE

## 2021-08-03 RX ORDER — ACETAMINOPHEN 500 MG
650 TABLET ORAL
Qty: 0 | Refills: 0 | DISCHARGE

## 2021-08-03 RX ORDER — ACETAMINOPHEN 500 MG
2 TABLET ORAL
Qty: 0 | Refills: 0 | DISCHARGE
Start: 2021-08-03

## 2021-08-03 RX ORDER — ASPIRIN/CALCIUM CARB/MAGNESIUM 324 MG
1 TABLET ORAL
Qty: 0 | Refills: 0 | DISCHARGE

## 2021-08-03 RX ADMIN — OXYCODONE HYDROCHLORIDE 5 MILLIGRAM(S): 5 TABLET ORAL at 10:17

## 2021-08-03 RX ADMIN — SERTRALINE 25 MILLIGRAM(S): 25 TABLET, FILM COATED ORAL at 11:20

## 2021-08-03 RX ADMIN — ATORVASTATIN CALCIUM 40 MILLIGRAM(S): 80 TABLET, FILM COATED ORAL at 21:59

## 2021-08-03 RX ADMIN — LIDOCAINE 1 PATCH: 4 CREAM TOPICAL at 20:03

## 2021-08-03 RX ADMIN — LIDOCAINE 1 PATCH: 4 CREAM TOPICAL at 23:00

## 2021-08-03 RX ADMIN — ENOXAPARIN SODIUM 40 MILLIGRAM(S): 100 INJECTION SUBCUTANEOUS at 11:19

## 2021-08-03 RX ADMIN — PANTOPRAZOLE SODIUM 40 MILLIGRAM(S): 20 TABLET, DELAYED RELEASE ORAL at 05:14

## 2021-08-03 RX ADMIN — LOSARTAN POTASSIUM 50 MILLIGRAM(S): 100 TABLET, FILM COATED ORAL at 09:14

## 2021-08-03 RX ADMIN — AMLODIPINE BESYLATE 10 MILLIGRAM(S): 2.5 TABLET ORAL at 05:13

## 2021-08-03 RX ADMIN — Medication 325 MILLIGRAM(S): at 11:19

## 2021-08-03 RX ADMIN — CHLORHEXIDINE GLUCONATE 1 APPLICATION(S): 213 SOLUTION TOPICAL at 05:14

## 2021-08-03 RX ADMIN — OXYCODONE HYDROCHLORIDE 5 MILLIGRAM(S): 5 TABLET ORAL at 11:17

## 2021-08-03 RX ADMIN — Medication 1 TABLET(S): at 11:19

## 2021-08-03 RX ADMIN — Medication 25 MILLIGRAM(S): at 13:00

## 2021-08-03 RX ADMIN — Medication 25 MILLIGRAM(S): at 21:59

## 2021-08-03 RX ADMIN — LIDOCAINE 1 PATCH: 4 CREAM TOPICAL at 11:18

## 2021-08-03 RX ADMIN — Medication 3 MILLIGRAM(S): at 21:58

## 2021-08-03 RX ADMIN — Medication 25 MILLIGRAM(S): at 05:14

## 2021-08-03 RX ADMIN — Medication 2000 UNIT(S): at 11:19

## 2021-08-03 NOTE — CDI QUERY NOTE - NSCDIOTHERTXTBX_GEN_ALL_CORE_HH
Patient is s/p fall and found to have subarachnoid hemorrhage; can you please specify the type?  A.	Traumatic subarachnoid hemorrhage  B.	Non traumatic subarachnoid hemorrhage  C.	Other, please specify  D.	Not clinically significant    Chart Documentation:      • Assessment	  80 Y F trauma b transfer s/p fall and subarachnoid hemorrhage and R wrist fracture       7/31/21 Consult Note Adult-Neurosurgery Physician Assistant/Attendin:  • Subjective and Objective:   Patient is a 80 year old  Female who presents with a chief complaint of [place] b transfer from outside hospital because of subarachnoid hemorrhage      8/3/21 Progress Note Adult-Trauma Surgery NP:  • Assessment	  81F s/p trip and fall w/SAH and R wrist fx s/p splint, repeat CT head stable, downgraded from SICU on 8/2 with no acute events.

## 2021-08-03 NOTE — CHART NOTE - NSCHARTNOTEFT_GEN_A_CORE
Please note upon review of chart the following diagnosis exists for this patient:    1.	Traumatic subarachnoid hemorrhage      Chart Documentation:  • Assessment	  80 Y F trauma b transfer s/p fall and subarachnoid hemorrhage and R wrist fracture       7/31/21 Consult Note Adult-Neurosurgery Physician Assistant/Attending:  • Subjective and Objective:   Patient is a 80 year old  Female who presents with a chief complaint of Ta[place] b transfer from outside hospital because of subarachnoid hemorrhage      8/3/21 Progress Note Adult-Trauma Surgery NP:  • Assessment	  81F s/p trip and fall w/SAH and R wrist fx s/p splint, repeat CT head stable, downgraded from SICU on 8/2 with no acute events.

## 2021-08-03 NOTE — PROGRESS NOTE ADULT - SUBJECTIVE AND OBJECTIVE BOX
SUBJECTIVE/24 hour events: Patient was seen and examined. Resting comfortably in bed with no acute events or complaints overnight.  Pain is now well controlled w/ multi-modal pain regimen. Pic score has remained an 8. Patient was able to be down graded from the SICU with no issues. Tolerating PO diet and IV locked, finger sticks well controlled. Denies HA, fever, chills, cough, chest pain, SOB, abdominal pain, N/V. Patient seen by PT and once medically stable is appropriate for home with home assist       Vital Signs Last 24 Hrs  T(C): 36.8 (03 Aug 2021 00:13), Max: 36.9 (02 Aug 2021 16:45)  T(F): 98.3 (03 Aug 2021 00:13), Max: 98.5 (02 Aug 2021 21:35)  HR: 59 (03 Aug 2021 00:13) (57 - 80)  BP: 144/73 (03 Aug 2021 00:13) (106/88 - 168/63)  BP(mean): 86 (02 Aug 2021 13:00) (71 - 94)  RR: 16 (03 Aug 2021 00:13) (13 - 23)  SpO2: 95% (03 Aug 2021 00:13) (93% - 100%)  Drug Dosing Weight  Height (cm): 152.4 (01 Aug 2021 03:20)  Weight (kg): 71.3 (01 Aug 2021 03:20)  BMI (kg/m2): 30.7 (01 Aug 2021 03:20)  BSA (m2): 1.68 (01 Aug 2021 03:20)  I&O's Detail    01 Aug 2021 07:01  -  02 Aug 2021 07:00  --------------------------------------------------------  IN:    IV PiggyBack: 100 mL    NiCARdipine: 5 mL    Oral Fluid: 1060 mL    sodium chloride 0.9%: 1350 mL  Total IN: 2515 mL    OUT:    Voided (mL): 1800 mL  Total OUT: 1800 mL    Total NET: 715 mL      02 Aug 2021 07:01  -  03 Aug 2021 04:24  --------------------------------------------------------  IN:    Oral Fluid: 480 mL  Total IN: 480 mL    OUT:    Voided (mL): 1100 mL  Total OUT: 1100 mL    Total NET: -620 mL        Allergies    fish (Angioedema)  fish (Swelling; Flushing; Short breath)  No Known Drug Allergies    Intolerances                              10.7   10.80 )-----------( 322      ( 02 Aug 2021 04:29 )             33.8   08-02    139  |  106  |  14.5  ----------------------------<  125<H>  4.2   |  23.0  |  0.87    Ca    8.1<L>      02 Aug 2021 04:29  Phos  3.5     08-02  Mg     2.2     08-02    TPro  6.6  /  Alb  3.5  /  TBili  0.4  /  DBili  x   /  AST  16  /  ALT  10  /  AlkPhos  89  08-01      ROS:    PHYSICAL EXAM:  Constitutional: resting comfortably   HEENT: Right periorbital ecchymosis and edema resolving   Respiratory: no respiratory distress, no dyspnea, no supplemental o2 needed   Gastrointestinal: abdomen soft, non-tender atraumatic   Genitourinary: prima fit in place draining well   Extremities: RUE NVI, warm to touch, able to move fingers, NWB, splint well seated   Neurological: A&OX3  Skin: warm, dry and no rashes         MEDICATIONS  (STANDING):  amLODIPine   Tablet 10 milliGRAM(s) Oral daily  atorvastatin 40 milliGRAM(s) Oral at bedtime  chlorhexidine 2% Cloths 1 Application(s) Topical <User Schedule>  cholecalciferol 2000 Unit(s) Oral daily  enoxaparin Injectable 40 milliGRAM(s) SubCutaneous daily  ferrous    sulfate 325 milliGRAM(s) Oral daily  hydrALAZINE 25 milliGRAM(s) Oral three times a day  insulin lispro (ADMELOG) corrective regimen sliding scale   SubCutaneous four times a day before meals  lidocaine   4% Patch 1 Patch Transdermal every 24 hours  losartan 50 milliGRAM(s) Oral daily  melatonin 3 milliGRAM(s) Oral at bedtime  multivitamin 1 Tablet(s) Oral daily  pantoprazole    Tablet 40 milliGRAM(s) Oral before breakfast  polyethylene glycol 3350 17 Gram(s) Oral daily  sertraline 25 milliGRAM(s) Oral daily    MEDICATIONS  (PRN):  acetaminophen   Tablet .. 650 milliGRAM(s) Oral every 6 hours PRN Mild Pain (1 - 3)  oxyCODONE    IR 2.5 milliGRAM(s) Oral every 4 hours PRN Moderate Pain (4 - 6)  oxyCODONE    IR 5 milliGRAM(s) Oral every 4 hours PRN Severe Pain (7 - 10)      RADIOLOGY STUDIES:    CULTURES:

## 2021-08-04 ENCOUNTER — TRANSCRIPTION ENCOUNTER (OUTPATIENT)
Age: 81
End: 2021-08-04

## 2021-08-04 VITALS
DIASTOLIC BLOOD PRESSURE: 74 MMHG | RESPIRATION RATE: 18 BRPM | OXYGEN SATURATION: 94 % | TEMPERATURE: 98 F | SYSTOLIC BLOOD PRESSURE: 117 MMHG | HEART RATE: 71 BPM

## 2021-08-04 LAB
GLUCOSE BLDC GLUCOMTR-MCNC: 128 MG/DL — HIGH (ref 70–99)
GLUCOSE BLDC GLUCOMTR-MCNC: 192 MG/DL — HIGH (ref 70–99)

## 2021-08-04 PROCEDURE — 80307 DRUG TEST PRSMV CHEM ANLYZR: CPT

## 2021-08-04 PROCEDURE — 82947 ASSAY GLUCOSE BLOOD QUANT: CPT

## 2021-08-04 PROCEDURE — 86769 SARS-COV-2 COVID-19 ANTIBODY: CPT

## 2021-08-04 PROCEDURE — 85610 PROTHROMBIN TIME: CPT

## 2021-08-04 PROCEDURE — 99232 SBSQ HOSP IP/OBS MODERATE 35: CPT

## 2021-08-04 PROCEDURE — 72170 X-RAY EXAM OF PELVIS: CPT

## 2021-08-04 PROCEDURE — 85014 HEMATOCRIT: CPT

## 2021-08-04 PROCEDURE — 82435 ASSAY OF BLOOD CHLORIDE: CPT

## 2021-08-04 PROCEDURE — 84132 ASSAY OF SERUM POTASSIUM: CPT

## 2021-08-04 PROCEDURE — 83735 ASSAY OF MAGNESIUM: CPT

## 2021-08-04 PROCEDURE — 82803 BLOOD GASES ANY COMBINATION: CPT

## 2021-08-04 PROCEDURE — 80048 BASIC METABOLIC PNL TOTAL CA: CPT

## 2021-08-04 PROCEDURE — 73600 X-RAY EXAM OF ANKLE: CPT | Mod: 26,LT

## 2021-08-04 PROCEDURE — 73080 X-RAY EXAM OF ELBOW: CPT

## 2021-08-04 PROCEDURE — 82962 GLUCOSE BLOOD TEST: CPT

## 2021-08-04 PROCEDURE — 73110 X-RAY EXAM OF WRIST: CPT

## 2021-08-04 PROCEDURE — 72125 CT NECK SPINE W/O DYE: CPT

## 2021-08-04 PROCEDURE — 71045 X-RAY EXAM CHEST 1 VIEW: CPT

## 2021-08-04 PROCEDURE — 73620 X-RAY EXAM OF FOOT: CPT

## 2021-08-04 PROCEDURE — 82330 ASSAY OF CALCIUM: CPT

## 2021-08-04 PROCEDURE — 36600 WITHDRAWAL OF ARTERIAL BLOOD: CPT

## 2021-08-04 PROCEDURE — 83690 ASSAY OF LIPASE: CPT

## 2021-08-04 PROCEDURE — 83036 HEMOGLOBIN GLYCOSYLATED A1C: CPT

## 2021-08-04 PROCEDURE — 73600 X-RAY EXAM OF ANKLE: CPT

## 2021-08-04 PROCEDURE — 70450 CT HEAD/BRAIN W/O DYE: CPT

## 2021-08-04 PROCEDURE — 36415 COLL VENOUS BLD VENIPUNCTURE: CPT

## 2021-08-04 PROCEDURE — 84100 ASSAY OF PHOSPHORUS: CPT

## 2021-08-04 PROCEDURE — 97163 PT EVAL HIGH COMPLEX 45 MIN: CPT

## 2021-08-04 PROCEDURE — 80053 COMPREHEN METABOLIC PANEL: CPT

## 2021-08-04 PROCEDURE — 85730 THROMBOPLASTIN TIME PARTIAL: CPT

## 2021-08-04 PROCEDURE — 83605 ASSAY OF LACTIC ACID: CPT

## 2021-08-04 PROCEDURE — 85025 COMPLETE CBC W/AUTO DIFF WBC: CPT

## 2021-08-04 PROCEDURE — 73130 X-RAY EXAM OF HAND: CPT

## 2021-08-04 PROCEDURE — 85018 HEMOGLOBIN: CPT

## 2021-08-04 PROCEDURE — 73620 X-RAY EXAM OF FOOT: CPT | Mod: 26,LT

## 2021-08-04 PROCEDURE — 84295 ASSAY OF SERUM SODIUM: CPT

## 2021-08-04 RX ADMIN — Medication 325 MILLIGRAM(S): at 11:50

## 2021-08-04 RX ADMIN — Medication 25 MILLIGRAM(S): at 04:50

## 2021-08-04 RX ADMIN — Medication 2000 UNIT(S): at 11:50

## 2021-08-04 RX ADMIN — SERTRALINE 25 MILLIGRAM(S): 25 TABLET, FILM COATED ORAL at 11:51

## 2021-08-04 RX ADMIN — POLYETHYLENE GLYCOL 3350 17 GRAM(S): 17 POWDER, FOR SOLUTION ORAL at 11:51

## 2021-08-04 RX ADMIN — Medication 1 TABLET(S): at 11:51

## 2021-08-04 RX ADMIN — LIDOCAINE 1 PATCH: 4 CREAM TOPICAL at 11:52

## 2021-08-04 RX ADMIN — OXYCODONE HYDROCHLORIDE 5 MILLIGRAM(S): 5 TABLET ORAL at 05:20

## 2021-08-04 RX ADMIN — LOSARTAN POTASSIUM 50 MILLIGRAM(S): 100 TABLET, FILM COATED ORAL at 11:51

## 2021-08-04 RX ADMIN — Medication 2: at 11:44

## 2021-08-04 RX ADMIN — AMLODIPINE BESYLATE 10 MILLIGRAM(S): 2.5 TABLET ORAL at 04:50

## 2021-08-04 RX ADMIN — CHLORHEXIDINE GLUCONATE 1 APPLICATION(S): 213 SOLUTION TOPICAL at 04:50

## 2021-08-04 RX ADMIN — OXYCODONE HYDROCHLORIDE 5 MILLIGRAM(S): 5 TABLET ORAL at 04:49

## 2021-08-04 RX ADMIN — PANTOPRAZOLE SODIUM 40 MILLIGRAM(S): 20 TABLET, DELAYED RELEASE ORAL at 04:50

## 2021-08-04 RX ADMIN — Medication 25 MILLIGRAM(S): at 14:06

## 2021-08-04 RX ADMIN — ENOXAPARIN SODIUM 40 MILLIGRAM(S): 100 INJECTION SUBCUTANEOUS at 11:50

## 2021-08-04 NOTE — PROGRESS NOTE ADULT - SUBJECTIVE AND OBJECTIVE BOX
SUBJECTIVE/24 hour events:   Patient was seen and examined. Resting comfortably in bed and overnight when ambulating to the bathroom began to complain of left ankle and foot pain with bearing weight, xray of the ankle and foot pending.   Pain is now well controlled w/ multi-modal pain regimen. Tolerating PO diet and IV locked, finger sticks well controlled. Denies HA, fever, chills, cough, chest pain, SOB, abdominal pain, N/V. Patient seen by PT and once medically stable is appropriate for home with home assist         Vital Signs Last 24 Hrs  T(C): 36.6 (04 Aug 2021 04:00), Max: 37.3 (03 Aug 2021 11:00)  T(F): 97.8 (04 Aug 2021 04:00), Max: 99.2 (03 Aug 2021 11:00)  HR: 62 (04 Aug 2021 04:00) (62 - 69)  BP: 150/85 (04 Aug 2021 04:00) (137/71 - 164/74)  BP(mean): --  RR: 16 (04 Aug 2021 04:00) (16 - 18)  SpO2: 94% (04 Aug 2021 04:00) (93% - 95%)  Drug Dosing Weight  Height (cm): 152.4 (01 Aug 2021 03:20)  Weight (kg): 71.3 (01 Aug 2021 03:20)  BMI (kg/m2): 30.7 (01 Aug 2021 03:20)  BSA (m2): 1.68 (01 Aug 2021 03:20)  I&O's Detail    02 Aug 2021 07:01  -  03 Aug 2021 07:00  --------------------------------------------------------  IN:    Oral Fluid: 480 mL  Total IN: 480 mL    OUT:    Voided (mL): 1500 mL  Total OUT: 1500 mL    Total NET: -1020 mL      03 Aug 2021 07:01  -  04 Aug 2021 06:11  --------------------------------------------------------  IN:    Oral Fluid: 240 mL  Total IN: 240 mL    OUT:    Voided (mL): 1450 mL  Total OUT: 1450 mL    Total NET: -1210 mL        Allergies    fish (Angioedema)  fish (Swelling; Flushing; Short breath)  No Known Drug Allergies    Intolerances                              11.1   11.41 )-----------( 332      ( 03 Aug 2021 06:20 )             34.4   08-03    137  |  103  |  13.5  ----------------------------<  129<H>  3.9   |  23.0  |  0.74    Ca    8.6      03 Aug 2021 06:20  Phos  2.9     08-03  Mg     1.9     08-03        ROS:  physical exam:  Constitutional: resting comfortably   HEENT: Right periorbital ecchymosis and edema resolving   Respiratory: no respiratory distress, no dyspnea, no supplemental o2 needed   Gastrointestinal: abdomen soft, non-tender atraumatic   Genitourinary: prima fit in place draining well   Extremities: RUE NVI, warm to touch, able to move fingers, NWB, splint well seated   Neurological: A&OX3  Skin: warm, dry and no rashes         MEDICATIONS  (STANDING):  amLODIPine   Tablet 10 milliGRAM(s) Oral daily  atorvastatin 40 milliGRAM(s) Oral at bedtime  chlorhexidine 2% Cloths 1 Application(s) Topical <User Schedule>  cholecalciferol 2000 Unit(s) Oral daily  enoxaparin Injectable 40 milliGRAM(s) SubCutaneous daily  ferrous    sulfate 325 milliGRAM(s) Oral daily  hydrALAZINE 25 milliGRAM(s) Oral three times a day  insulin lispro (ADMELOG) corrective regimen sliding scale   SubCutaneous four times a day before meals  lidocaine   4% Patch 1 Patch Transdermal every 24 hours  losartan 50 milliGRAM(s) Oral daily  melatonin 3 milliGRAM(s) Oral at bedtime  multivitamin 1 Tablet(s) Oral daily  pantoprazole    Tablet 40 milliGRAM(s) Oral before breakfast  polyethylene glycol 3350 17 Gram(s) Oral daily  sertraline 25 milliGRAM(s) Oral daily    MEDICATIONS  (PRN):  acetaminophen   Tablet .. 650 milliGRAM(s) Oral every 6 hours PRN Mild Pain (1 - 3)  oxyCODONE    IR 2.5 milliGRAM(s) Oral every 4 hours PRN Moderate Pain (4 - 6)  oxyCODONE    IR 5 milliGRAM(s) Oral every 4 hours PRN Severe Pain (7 - 10)      RADIOLOGY STUDIES:    CULTURES:

## 2021-08-04 NOTE — DISCHARGE NOTE NURSING/CASE MANAGEMENT/SOCIAL WORK - PATIENT PORTAL LINK FT
You can access the FollowMyHealth Patient Portal offered by [facility] by registering at the following website: [email or web address]. By joining [employer]’s FollowMyHealth portal, you will also be able to view your health information using other applications (apps) compatible with our system.

## 2021-08-04 NOTE — PROGRESS NOTE ADULT - ATTENDING COMMENTS
81-year-old female status post fall where she suffered SAH, right wrist fracture which is status post splinting.  Awake, alert, oriented x3, no acute distress,  Pulmonary clear to auscultation  Cardiovascular regular rate and rhythm  GI benign  MS right wrist in splint, sensation grossly intact  Plan  1.  PT OT for mobility  2.  Out of bed to chair  3.  Stable for d/c Home with home PT    Code [id number] .
I have seen and examined  the patient during SICU rounds from 830-10a  Events and images reviewed.    Neuro: Awake, GCS 15 SOLORZANO no focal  CV: HD normal';  Pulm: Sat ok complaining of tender right lower chest  GI: Abd soft,   : Urine flow adequate: hypokalemia hypomagnesia  ID: no issues  Heme: H/H stable SCD    PLAN:  Right chest pain with tenderness, CXR no gross fx but will treat asn rib fractures w/o CT  ABG, PIC scores  multimodality analgesia.  Neuro stable  PT  Start diet
Patient seen and examined on am rounds. agree with note above. Pt doing well, pain controlled, working well with her IS, GCS 15, ok for Q4 neuro checks per NSG, restart DVT prophylaxis, pt's home meds, bowel regimen. Stable for transfer to the floor.
Agree
81-year-old female status post fall where she suffered SAH, right wrist fracture which is status post splinting.  Awake, alert, oriented x3, no acute distress,  Pulmonary clear to auscultation  Cardiovascular regular rate and rhythm  GI benign  MS right wrist in splint, sensation grossly intact  Plan  1.  PT OT for mobility  2.  Out of bed to chair  3.  Dispo Home with home PT    Code [id number]
agree

## 2021-08-04 NOTE — PROGRESS NOTE ADULT - REASON FOR ADMISSION
Tracey rojas transfer from outside hospital because of subarachnoid hemorrhage
Trauma b transfer from outside hospital because of subarachnoid hemorrhage
Tracey rojas transfer from outside hospital because of subarachnoid hemorrhage
Trauma b transfer from outside hospital because of subarachnoid hemorrhage

## 2021-08-04 NOTE — PROGRESS NOTE ADULT - ASSESSMENT
80 year old female w/ PMHX HTN, trf from [facility] s/p fall w/ SAH.    Plan:  -D/w Dr. Lopez  -q1h neuro checks  -Imaging reviewed  -Recommend repeat CTH this evening (8/1)  -SCDs for DVT PPX, chemical DVT PPX contraindicated 2/2 increased bleeding risk  -Pain control PRN; avoid oversedation  -Further medical management via primary team   
81F s/p trip and fall w/SAH and R wrist fx s/p splint, repeat CT head stable, downgraded from SICU on 8/2 with no acute events.  plan:  Continue neurovascular checks  pain control  pic protocol for clinical rib fractures on the right  nwb rue  PT/OT  DVT ppx (ok by nsx)  scds  continue home medications  once medically stable PT evaluated and patient appropriate for home with assist   
Assessment: 81 y/o Female transferred from [facility] w/ a SDH R wrist fracture, and R chest wall pain likely secondary to rib fracture. Admitted to the ICU for q1 hour neuro checks and PIC Protocol. Home sertraline and hydralazine restarted.     Neuro: Q2h neuro checks, decrease to q4h likely today. Pain control with Tylenol, lidocaine patch, and low dose oxycodone. Will increased pain regimen as needed. Patient reports pain is well controlled at this point. Regular sleep wake cycles as able, Melatonin to improve sleep hygiene.     CV: HD stable, intermittently SB while sleeping. Hydralazine restarted overnight for SBP in 160's. Continue to hold Losartan and Amlodipine. Possibly add back on today if BP warrants it. Atorvastatin restarted yesterday. Lactate cleared.    Pulm: PIC Protocol, encourage coughing and deep breathing.    GI/Nutrition: started consistent carb diet    /Renal: voiding, strict intake output, metabolic acidosis resolved. Hypokalemia and Hypomagnesemia grossly resolved. PO repletions overnight and will replete based off AM labs.      ID: no issues. Monitor for fevers, monitor WBC.    Endo: FS q6, ISS PRN. holding home metformin.    Skin: Repositioning for DTI prevention while in bed    Heme/DVT Prophylaxis: SCDs, no chemical ppx at this time given recent SHD. F/U with neurosurgery when okay to start chemical ppx.    Lines/Tubes: PIVs    Dispo: PT/OT, downgrade in AM w/ q4h neuro checks.
81 y/o female s/p trip and fall with tSAH   - CTH stable   - may downgrade to floor   - may start DVT prophylaxis   - rpt CTH in 2 weeks and f/u Dr. Lopez 
81F s/p trip and fall w/SAH and R wrist fx s/p splint, repeat CT head stable, downgraded from SICU on 8/2 with no acute events.  plan:  Continue neurovascular checks  pain control  pic protocol for clinical rib fractures on the right  nwb rue  PT/OT  DVT ppx (ok by nsx)  scds  continue home medications  once medically stable PT evaluated and patient appropriate for home with assist

## 2021-08-05 ENCOUNTER — NON-APPOINTMENT (OUTPATIENT)
Age: 81
End: 2021-08-05

## 2021-08-09 PROBLEM — I10 ESSENTIAL (PRIMARY) HYPERTENSION: Chronic | Status: ACTIVE | Noted: 2021-07-31

## 2021-08-10 ENCOUNTER — EMERGENCY (EMERGENCY)
Facility: HOSPITAL | Age: 81
LOS: 1 days | Discharge: HOME | End: 2021-08-10
Attending: EMERGENCY MEDICINE | Admitting: EMERGENCY MEDICINE
Payer: MEDICARE

## 2021-08-10 VITALS
DIASTOLIC BLOOD PRESSURE: 72 MMHG | HEART RATE: 72 BPM | OXYGEN SATURATION: 97 % | RESPIRATION RATE: 18 BRPM | SYSTOLIC BLOOD PRESSURE: 130 MMHG | TEMPERATURE: 98 F

## 2021-08-10 VITALS
RESPIRATION RATE: 16 BRPM | HEIGHT: 60 IN | TEMPERATURE: 97 F | SYSTOLIC BLOOD PRESSURE: 127 MMHG | OXYGEN SATURATION: 98 % | DIASTOLIC BLOOD PRESSURE: 68 MMHG | HEART RATE: 70 BPM

## 2021-08-10 DIAGNOSIS — Z98.89 OTHER SPECIFIED POSTPROCEDURAL STATES: Chronic | ICD-10-CM

## 2021-08-10 DIAGNOSIS — Z98.890 OTHER SPECIFIED POSTPROCEDURAL STATES: Chronic | ICD-10-CM

## 2021-08-10 PROCEDURE — 99283 EMERGENCY DEPT VISIT LOW MDM: CPT

## 2021-08-10 RX ORDER — OXYCODONE AND ACETAMINOPHEN 5; 325 MG/1; MG/1
1 TABLET ORAL ONCE
Refills: 0 | Status: DISCONTINUED | OUTPATIENT
Start: 2021-08-10 | End: 2021-08-10

## 2021-08-10 RX ADMIN — OXYCODONE AND ACETAMINOPHEN 1 TABLET(S): 5; 325 TABLET ORAL at 20:16

## 2021-08-10 RX ADMIN — Medication 300 MILLIGRAM(S): at 22:28

## 2021-08-10 RX ADMIN — OXYCODONE AND ACETAMINOPHEN 1 TABLET(S): 5; 325 TABLET ORAL at 22:22

## 2021-08-10 NOTE — ED PROVIDER NOTE - UPPER EXTREMITY EXAM, LEFT
left 4th finger noted with ring stuck, finger distal to ring edemaous, skin under ring noted erythematous excoriated raw, cap refill <2 sec

## 2021-08-10 NOTE — ED ADULT TRIAGE NOTE - CHIEF COMPLAINT QUOTE
S/P fall 2 weeks ago. Patient was seen here and transferred to [facility]. Now c/o that ring stuck in left ring finger. Finger is swollen and red.

## 2021-08-10 NOTE — ED PROVIDER NOTE - NSICDXPASTSURGICALHX_GEN_ALL_CORE_FT
PAST SURGICAL HISTORY:  H/O knee surgery right    S/P spinal surgery     S/P subtotal thyroidectomy

## 2021-08-10 NOTE — ED PROCEDURE NOTE - PROCEDURE ADDITIONAL DETAILS
Ring removed using ring cutter and jessica  cellulitic skin noted under ring  wound dressed with xeroform/sterile gauze   pt will fu with wound care and pcp

## 2021-08-10 NOTE — ED PROVIDER NOTE - NSFOLLOWUPINSTRUCTIONS_ED_ALL_ED_FT
Follow up with wound care center within 1-2 days. Call tomorrow for appointment. ([address] [place] [address] -Phone: [phone], Fax: [phone].)  Take antibiotics as prescribed  Return to the ED immediately for new or worsening symptoms.                                                             Cellulitis, Adult       Cellulitis is a skin infection. The infected area is usually warm, red, swollen, and tender. This condition occurs most often in the arms and lower legs. The infection can travel to the muscles, blood, and underlying tissue and become serious. It is very important to get treated for this condition.      What are the causes?    Cellulitis is caused by bacteria. The bacteria enter through a break in the skin, such as a cut, burn, insect bite, open sore, or crack.      What increases the risk?  This condition is more likely to occur in people who:  •Have a weak body defense system (immune system).      •Have open wounds on the skin, such as cuts, burns, bites, and scrapes. Bacteria can enter the body through these open wounds.      •Are older than 60 years of age.      •Have diabetes.      •Have a type of long-lasting (chronic) liver disease (cirrhosis) or kidney disease.      •Are obese.    •Have a skin condition such as:  •Itchy rash (eczema).      •Slow movement of blood in the veins (venous stasis).      •Fluid buildup below the skin (edema).        •Have had radiation therapy.      •Use IV drugs.        What are the signs or symptoms?  Symptoms of this condition include:  •Redness, streaking, or spotting on the skin.      •Swollen area of the skin.      •Tenderness or pain when an area of the skin is touched.      •Warm skin.      •A fever.      •Chills.      •Blisters.        How is this diagnosed?  This condition is diagnosed based on a medical history and physical exam. You may also have tests, including:  •Blood tests.      •Imaging tests.        How is this treated?  Treatment for this condition may include:  •Medicines, such as antibiotic medicines or medicines to treat allergies (antihistamines).      •Supportive care, such as rest and application of cold or warm cloths (compresses) to the skin.      •Hospital care, if the condition is severe.      The infection usually starts to get better within 1–2 days of treatment.      Follow these instructions at home:     Medicines     •Take over-the-counter and prescription medicines only as told by your health care provider.      •If you were prescribed an antibiotic medicine, take it as told by your health care provider. Do not stop taking the antibiotic even if you start to feel better.      General instructions     •Drink enough fluid to keep your urine pale yellow.      • Do not touch or rub the infected area.      •Raise (elevate) the infected area above the level of your heart while you are sitting or lying down.      •Apply warm or cold compresses to the affected area as told by your health care provider.      •Keep all follow-up visits as told by your health care provider. This is important. These visits let your health care provider make sure a more serious infection is not developing.        Contact a health care provider if:    •You have a fever.      •Your symptoms do not begin to improve within 1–2 days of starting treatment.      •Your bone or joint underneath the infected area becomes painful after the skin has healed.      •Your infection returns in the same area or another area.      •You notice a swollen bump in the infected area.      •You develop new symptoms.      •You have a general ill feeling (malaise) with muscle aches and pains.        Get help right away if:    •Your symptoms get worse.      •You feel very sleepy.      •You develop vomiting or diarrhea that persists.      •You notice red streaks coming from the infected area.      •Your red area gets larger or turns dark in color.      These symptoms may represent a serious problem that is an emergency. Do not wait to see if the symptoms will go away. Get medical help right away. Call your local emergency services (911 in the [place] Do not drive yourself to the hospital.       Summary    •Cellulitis is a skin infection. This condition occurs most often in the arms and lower legs.      •Treatment for this condition may include medicines, such as antibiotic medicines or antihistamines.      •Take over-the-counter and prescription medicines only as told by your health care provider. If you were prescribed an antibiotic medicine, do not stop taking the antibiotic even if you start to feel better.      •Contact a health care provider if your symptoms do not begin to improve within 1–2 days of starting treatment or your symptoms get worse.      •Keep all follow-up visits as told by your health care provider. This is important. These visits let your health care provider make sure that a more serious infection is not developing.      This information is not intended to replace advice given to you by your health care provider. Make sure you discuss any questions you have with your health care provider.      Document Revised: 12/28/2020 Document Reviewed: 05/09/2019    Elsevier Patient Education © 2021 [employer]

## 2021-08-10 NOTE — ED ADULT NURSE NOTE - OBJECTIVE STATEMENT
Pt received in bed alert and oriented and resting in bed with the c/o ring being stuck on her left hand 4th digit. Pt states that her finger started swelling up about 3 days ago and has been getting increasingly worse. Pt now being seen by ER MD. Nursing care ongoing and safety maintained. Pt left 4th digit is very red and swollen. Nursing care ongoing and safety maintained. Pt's son at bedside. Pt has cast to right arm from FX.

## 2021-08-10 NOTE — ED PROVIDER NOTE - PATIENT PORTAL LINK FT
You can access the FollowMyHealth Patient Portal offered by [facility] by registering at the following website: [email or web address]. By joining [email or web address] FollowMyHealth portal, you will also be able to view your health information using other applications (apps) compatible with our system.

## 2021-08-10 NOTE — ED PROVIDER NOTE - PROGRESS NOTE DETAILS
ring removed successfully, will treat underlying cellulitis with po clinda; Pt and son given an opportunity to have all questions answered to satisfaction.  Discussed the importance of prompt, close medical and wound care follow-up. ED return precautions discussed at length.  Pt verbalizes agreement and understanding of plan and ED return precautions. Pt well appearing, stable for DC home. No emergent concerns at this time.

## 2021-08-10 NOTE — ED PROVIDER NOTE - NSICDXPASTMEDICALHX_GEN_ALL_CORE_FT
PAST MEDICAL HISTORY:  BP (high blood pressure)     CAD (coronary atherosclerotic disease)     Depression     GERD (gastroesophageal reflux disease)     High cholesterol     Hypertension     Prediabetes     Thyroid cancer

## 2021-08-10 NOTE — ED PROVIDER NOTE - ATTENDING CONTRIBUTION TO CARE
Pt seen and examined and d/w PA.  agree with a and p.  pt is an elderly female sp trip and fx of left wrist last week. pt states today noticed some swelling in right hand and  tried to remove ring from finger but unable. pt states area around ring with mild redness, ring removed with cutters and pt with mild celllitis locally and skin erosion/raw in bed wear ring was, wound cleaned adnd dressed,  d/c on clindamycin

## 2021-08-10 NOTE — ED PROVIDER NOTE - OBJECTIVE STATEMENT
81 yo F PMHx HTN, HLD, GERD, depression, SAH, R wrist fx sp mechanical trip and fall last week p/w ring stuck to left 4th finger. Pt admits to swelling since fall, attempted to remove ring but was unable to do so. Pt denies fever/chills, HA, dizziness, neck pain, chest pain, SOB, numbness/tingling

## 2021-08-10 NOTE — ED PROVIDER NOTE - CLINICAL SUMMARY MEDICAL DECISION MAKING FREE TEXT BOX
81 yo M p/w ring stuck left 4th finger, with underlying cellulitis--> plan: remove ring, dc on po abx and wound care referral

## 2021-08-10 NOTE — ED ADULT NURSE NOTE - CHIEF COMPLAINT QUOTE
S/P fall 2 weeks ago. Patient was seen here and transferred to [facility]. Now c/o that ring stuck in left ring finger. Finger is swollen and red.

## 2021-08-10 NOTE — ED ADULT NURSE REASSESSMENT NOTE - NS ED NURSE REASSESS COMMENT FT1
Assumed care of Pt from previous RN, Pt ring stuck on left ring finger, PA at bedside to perform procedure ot remove ring, pain meds given, will continue to monitor closely.

## 2021-08-12 ENCOUNTER — OUTPATIENT (OUTPATIENT)
Dept: OUTPATIENT SERVICES | Facility: HOSPITAL | Age: 81
LOS: 1 days | Discharge: HOME | End: 2021-08-12
Payer: MEDICARE

## 2021-08-12 ENCOUNTER — APPOINTMENT (OUTPATIENT)
Dept: WOUND CARE | Facility: HOSPITAL | Age: 81
End: 2021-08-12
Payer: MEDICARE

## 2021-08-12 VITALS
RESPIRATION RATE: 18 BRPM | TEMPERATURE: 97.6 F | HEART RATE: 63 BPM | HEIGHT: 59 IN | WEIGHT: 154 LBS | OXYGEN SATURATION: 99 % | DIASTOLIC BLOOD PRESSURE: 69 MMHG | BODY MASS INDEX: 31.04 KG/M2 | SYSTOLIC BLOOD PRESSURE: 141 MMHG
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
## 2021-08-12 DIAGNOSIS — Z98.890 OTHER SPECIFIED POSTPROCEDURAL STATES: ICD-10-CM

## 2021-08-12 DIAGNOSIS — Z98.890 OTHER SPECIFIED POSTPROCEDURAL STATES: Chronic | ICD-10-CM

## 2021-08-12 DIAGNOSIS — Z86.008 PERSONAL HISTORY OF IN-SITU NEOPLASM OF OTHER SITE: ICD-10-CM

## 2021-08-12 DIAGNOSIS — Z79.84 LONG TERM (CURRENT) USE OF ORAL HYPOGLYCEMIC DRUGS: ICD-10-CM

## 2021-08-12 DIAGNOSIS — Z87.01 PERSONAL HISTORY OF PNEUMONIA (RECURRENT): ICD-10-CM

## 2021-08-12 DIAGNOSIS — K21.9 GASTRO-ESOPHAGEAL REFLUX DISEASE WITHOUT ESOPHAGITIS: ICD-10-CM

## 2021-08-12 DIAGNOSIS — E11.59 TYPE 2 DIABETES MELLITUS WITH OTHER CIRCULATORY COMPLICATIONS: ICD-10-CM

## 2021-08-12 DIAGNOSIS — L03.119 CELLULITIS OF UNSPECIFIED PART OF LIMB: ICD-10-CM
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
## 2021-08-12 DIAGNOSIS — S61.215D LACERATION WITHOUT FOREIGN BODY OF LEFT RING FINGER WITHOUT DAMAGE TO NAIL, SUBSEQUENT ENCOUNTER: ICD-10-CM

## 2021-08-12 DIAGNOSIS — I35.9 NONRHEUMATIC AORTIC VALVE DISORDER, UNSPECIFIED: ICD-10-CM

## 2021-08-12 DIAGNOSIS — Z98.89 OTHER SPECIFIED POSTPROCEDURAL STATES: Chronic | ICD-10-CM

## 2021-08-12 DIAGNOSIS — Z85.828 PERSONAL HISTORY OF OTHER MALIGNANT NEOPLASM OF SKIN: ICD-10-CM
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
## 2021-08-12 DIAGNOSIS — K90.0 CELIAC DISEASE: ICD-10-CM

## 2021-08-12 DIAGNOSIS — Z87.11 PERSONAL HISTORY OF PEPTIC ULCER DISEASE: ICD-10-CM

## 2021-08-12 DIAGNOSIS — Z79.899 OTHER LONG TERM (CURRENT) DRUG THERAPY: ICD-10-CM

## 2021-08-12 DIAGNOSIS — Z85.850 PERSONAL HISTORY OF MALIGNANT NEOPLASM OF THYROID: ICD-10-CM

## 2021-08-12 DIAGNOSIS — E78.5 HYPERLIPIDEMIA, UNSPECIFIED: ICD-10-CM

## 2021-08-12 DIAGNOSIS — F41.9 ANXIETY DISORDER, UNSPECIFIED: ICD-10-CM

## 2021-08-12 DIAGNOSIS — M81.0 AGE-RELATED OSTEOPOROSIS WITHOUT CURRENT PATHOLOGICAL FRACTURE: ICD-10-CM

## 2021-08-12 PROCEDURE — 99213 OFFICE O/P EST LOW 20 MIN: CPT

## 2021-08-12 PROCEDURE — G0463: CPT

## 2021-08-12 RX ORDER — CHOLECALCIFEROL (VITAMIN D3) 25 MCG
25 MCG TABLET ORAL
Qty: 90 | Refills: 3 | Status: DISCONTINUED | COMMUNITY
Start: 2017-01-19 | End: 2021-08-12

## 2021-08-12 RX ORDER — MV-MN/C/THEANINE/HERB NO.310 1000-200MG
POWDER IN PACKET (EA) ORAL
Refills: 0 | Status: ACTIVE | COMMUNITY

## 2021-08-12 RX ORDER — CLINDAMYCIN HYDROCHLORIDE 300 MG/1
300 CAPSULE ORAL
Refills: 0 | Status: ACTIVE | COMMUNITY

## 2021-08-12 NOTE — VITALS
[Pain related to present condition?] : The patient's  pain is not related to present condition. [] : No [de-identified] : 0

## 2021-08-12 NOTE — ASSESSMENT
[Verbal] : Verbal [Written] : Written [Demo] : Demo [Patient] : Patient [Family member] : Family member [Good - alert, interested, motivated] : Good - alert, interested, motivated [Verbalizes knowledge/Understanding] : Verbalizes knowledge/understanding [Dressing changes] : dressing changes [Skin Care] : skin care [Signs and symptoms of infection] : sign and symptoms of infection [How and When to Call] : how and when to call [Patient responsibility to plan of care] : patient responsibility to plan of care [Stable] : stable [Home] : Home [Walker] : Walker [Not Applicable - Long Term Care/Home Health Agency] : Long Term Care/Home Health Agency: Not Applicable [] : No [FreeTextEntry2] : Infection Prevention\par Restore Skin Integrity\par Local Wound Care\par F/U 2 weeks\par  [FreeTextEntry3] : Initial visit  [FreeTextEntry4] : F/U 2 weeks [FreeTextEntry1] : S/P Fall and recent hospitalization at [facility].  Reports having developed peripheral edema which resulted in hands swelling leading to constriction of left finger by her rings.  She had ring cut off and as a result of the above has a laceration, nearly circumferential around the base of the left ring finger.\par \par Skin remains excoriated.  No erythema or cellulitis.  minimal slough.  \par \par Wash area gently with warm soap and water.  Gently pat dry.\par Daily dressing changes with Xeroform gauze.\par \par 20 min for patient care and medical decision making.\par \par f/u in 2 weeks.

## 2021-08-12 NOTE — REVIEW OF SYSTEMS
[FreeTextEntry9] : Right upper extremity immobilized in cast s/p fall. [de-identified] : Laceration to left ring finger

## 2021-08-12 NOTE — HISTORY OF PRESENT ILLNESS
[FreeTextEntry1] : 80 year old female presents to the [facility] with a left digit wound. The patient reports that she fell 2 weeks ago and was treated at [facility] for bleeding on the brain. During her admission it was noticed that the left hand 4th digit was swollen and infected. She was treated for cellulitis of her finger.  She was discharged on 8/4/21 and recommended to follow up with the [facility] for care.

## 2021-08-12 NOTE — PHYSICAL EXAM
[4 x 4] : 4 x 4  [Alert] : alert [Oriented to Person] : oriented to person [Oriented to Place] : oriented to place [Oriented to Time] : oriented to time [Calm] : calm [de-identified] : Elderly woman in no distress [de-identified] : NCAT, ANNALISA, EOMI.  Ecchymosis below right eye [de-identified] : Right upper extremity in cast, sling. s/p fall [de-identified] : Skin on dorsum or right ring finger excoriated but healing nicely.  FROM of left ring finger. [FreeTextEntry1] : Hand 4th Digit  [FreeTextEntry2] : 0.7 [FreeTextEntry3] : circumferential  [FreeTextEntry4] : 0.2 [de-identified] : serosanguineous [de-identified] : 90% [de-identified] : 10% [de-identified] : Xeroform  [de-identified] : Cleansed with Normal saline\par  [TWNoteComboBox1] : Left [TWNoteComboBox4] : Small [TWNoteComboBox5] : No [de-identified] : No [TWNoteComboBox6] : Traumatic [de-identified] : Normal [de-identified] : None [de-identified] : None [de-identified] : Yes [de-identified] : 3x Weekly [de-identified] : Primary Dressing

## 2021-08-17 ENCOUNTER — NON-APPOINTMENT (OUTPATIENT)
Age: 81
End: 2021-08-17

## 2021-08-17 ENCOUNTER — APPOINTMENT (OUTPATIENT)
Dept: NEUROSURGERY | Facility: CLINIC | Age: 81
End: 2021-08-17
Payer: MEDICARE

## 2021-08-17 VITALS
HEART RATE: 63 BPM | SYSTOLIC BLOOD PRESSURE: 141 MMHG | BODY MASS INDEX: 31.04 KG/M2 | TEMPERATURE: 97.3 F | HEIGHT: 59 IN | OXYGEN SATURATION: 98 % | DIASTOLIC BLOOD PRESSURE: 71 MMHG | WEIGHT: 154 LBS

## 2021-08-17 PROCEDURE — 99214 OFFICE O/P EST MOD 30 MIN: CPT

## 2021-08-17 NOTE — ASSESSMENT
[FreeTextEntry1] : Patient with above history and imaging. Right temporal and left frontal subarachnoid hemorrhage with no neurological deficits. Will do follow up CT head at 1 month post hospitalization.\par \par Plan:\par CT head end of the month\par f/u after imaging\par Patient knows to call the office if there are any new or worsening symptoms. \par All questions and concerns answered and addressed in detail to patient's complete satisfaction. Patient verbalized understanding and agreed to plan.\par \par

## 2021-08-17 NOTE — HISTORY OF PRESENT ILLNESS
[FreeTextEntry1] : bleed after fall  [de-identified] : ADELFO KERR is a 80 year old female who presents for neurosurgical evaluation of right temporal and left frontal subarachnoid hemorrhage found after a fall. She was transferred from outside hospital to [facility] on 7/31/21 for SAH and right wrist Fx after trip and fall outside of [employer].  Admitted to ICU 7/31.  Repeat CT head done on 8/1/21 reveals  stable bleed.  Aspirin may be resumed at 81 mg as per neurosurgery.  (Pt. was on 325 mg Qday).\par Currently, she is feeling better. She just got her right arm cast taken off yesterday. She is not taking her aspirin. She had been taking aspirin on her own for prophylaxis. She denies current headache, blurry/double/loss of vision, changes in personality or cognition, difficulty speaking or finding the correct words, unilateral weakness or impaired sensation, problems with coordination, difficulty walking or falls. She is doing PT 1-2x/week and OT 3x a week. She will be following up with her PCP next week in regards to taking 81 mg of aspirin. \par  \par  \par  \par \par

## 2021-08-17 NOTE — DATA REVIEWED
[de-identified] : EXAM: CT CERVICAL SPINE\par  EXAM: CT BRAIN\par \par PROCEDURE DATE: 08/01/2021\par \par \par \par INTERPRETATION: CLINICAL INDICATION: trauma\par \par COMPARISON: None\par \par TECHNIQUE: Noncontrast axial CT images of the head and cervical spine were obtained. Coronal and sagittal reconstructions were performed.\par \par HEAD CT FINDINGS:\par The ventricles and sulci are prominent in size, compatible with generalized parenchymal volume loss. Subarachnoid hemorrhage is identified in the right temporal and left frontal lobes is unchanged. No new hemorrhage identified. There is no extra-axial fluid collection. No mass effect or midline shift is seen. There is no evidence of acute territorial infarct. There are periventricular and subcortical white matter hypodensities, which are nonspecific, but likely reflect mild microvascular ischemic disease.\par The visualized paranasal sinuses are clear. The mastoid air cells are well aerated. No acute osseous abnormality is seen. Scalp hematoma in the right frontal lobe.\par \par CERVICAL SPINE CT FINDINGS:\par Cervical lordosis is maintained. There is no spondylolisthesis. There is no acute displaced fracture. There is no prevertebral soft tissue swelling. The vertebral body heights are maintained. Endplate productive changes, facet arthropathy, and disc space narrowing noted at multiple levels, most prominent at C5-C6 and C6-C7.\par \par IMPRESSION:\par \par CT head: Unchanged subarachnoid hemorrhage in the right temporal and left frontal lobes. No new areas of hemorrhage.\par \par CT C-spine: No acute fracture or subluxation.

## 2021-08-17 NOTE — PHYSICAL EXAM
[General Appearance - Alert] : alert [General Appearance - In No Acute Distress] : in no acute distress [Impaired Insight] : insight and judgment were intact [Oriented To Time, Place, And Person] : oriented to person, place, and time [Affect] : the affect was normal [Person] : oriented to person [Place] : oriented to place [Time] : oriented to time [Cranial Nerves Optic (II)] : visual acuity intact bilaterally,  pupils equal round and reactive to light [Cranial Nerves Oculomotor (III)] : extraocular motion intact [Cranial Nerves Trigeminal (V)] : facial sensation intact symmetrically [Cranial Nerves Facial (VII)] : face symmetrical [Cranial Nerves Glossopharyngeal (IX)] : tongue and palate midline [Cranial Nerves Accessory (XI - Cranial And Spinal)] : head turning and shoulder shrug symmetric [Cranial Nerves Hypoglossal (XII)] : there was no tongue deviation with protrusion [Motor Strength] : muscle strength was normal in all four extremities [Sensation Tactile Decrease] : light touch was intact [Sensation Pain / Temperature Decrease] : pain and temperature was intact [Balance] : balance was intact [No Visual Abnormalities] : no visible abnormailities [No Tenderness to Palpation] : no spine tenderness on palpation [Normal] : normal [Sclera] : the sclera and conjunctiva were normal [PERRL With Normal Accommodation] : pupils were equal in size, round, reactive to light, with normal accommodation [Extraocular Movements] : extraocular movements were intact [Neck Appearance] : the appearance of the neck was normal [] : no respiratory distress [Abnormal Walk] : normal gait [Involuntary Movements] : no involuntary movements were seen [Motor Tone] : muscle strength and tone were normal [Coordination - Dysmetria Impaired Finger-to-Nose Left Only] : not present on the left side [FreeTextEntry6] : Right wrist is broken unable to assess as arm is in a sling [FreeTextEntry8] : u

## 2021-08-17 NOTE — REVIEW OF SYSTEMS
[As Noted in HPI] : as noted in HPI [Joint Pain] : joint pain [Limb Pain] : limb pain [Negative] : Heme/Lymph

## 2021-08-19 ENCOUNTER — NON-APPOINTMENT (OUTPATIENT)
Age: 81
End: 2021-08-19

## 2021-08-24 ENCOUNTER — APPOINTMENT (OUTPATIENT)
Dept: INTERNAL MEDICINE | Facility: CLINIC | Age: 81
End: 2021-08-24
Payer: MEDICARE

## 2021-08-24 VITALS
SYSTOLIC BLOOD PRESSURE: 122 MMHG | DIASTOLIC BLOOD PRESSURE: 70 MMHG | HEIGHT: 59 IN | TEMPERATURE: 96.8 F | HEART RATE: 99 BPM | RESPIRATION RATE: 16 BRPM | BODY MASS INDEX: 29.72 KG/M2 | OXYGEN SATURATION: 98 % | WEIGHT: 147.4 LBS

## 2021-08-24 DIAGNOSIS — R06.00 DYSPNEA, UNSPECIFIED: ICD-10-CM

## 2021-08-24 PROCEDURE — 99214 OFFICE O/P EST MOD 30 MIN: CPT

## 2021-08-24 NOTE — REVIEW OF SYSTEMS
[Fatigue] : fatigue [Shortness Of Breath] : shortness of breath [Nausea] : nausea [Negative] : Heme/Lymph [FreeTextEntry2] : feeling weak

## 2021-08-24 NOTE — ADDENDUM
[FreeTextEntry1] : I, Kevin Castaneda, acted solely as scribe for Dr. Edu Boston DO on this date 08/24/2021  2:00PM .\par \par All medical record entries made by the Scribe were at my, Dr. Edu Boston DO direction and personally dictated by me on 08/24/2021  2:00PM. I have reviewed the chart and agree that the record accurately reflects my personal performance of the history, physical exam, assessment and plan. I have also personally directed, reviewed and agreed with the chart.\par

## 2021-08-24 NOTE — PHYSICAL EXAM
[No Acute Distress] : no acute distress [Well Nourished] : well nourished [Well Developed] : well developed [Well-Appearing] : well-appearing [Normal Sclera/Conjunctiva] : normal sclera/conjunctiva [PERRL] : pupils equal round and reactive to light [EOMI] : extraocular movements intact [Normal Outer Ear/Nose] : the outer ears and nose were normal in appearance [Normal Oropharynx] : the oropharynx was normal [No JVD] : no jugular venous distention [No Lymphadenopathy] : no lymphadenopathy [Supple] : supple [Thyroid Normal, No Nodules] : the thyroid was normal and there were no nodules present [No Respiratory Distress] : no respiratory distress  [No Accessory Muscle Use] : no accessory muscle use [Clear to Auscultation] : lungs were clear to auscultation bilaterally [Normal Rate] : normal rate  [Regular Rhythm] : with a regular rhythm [Normal S1, S2] : normal S1 and S2 [No Murmur] : no murmur heard [No Carotid Bruits] : no carotid bruits [No Abdominal Bruit] : a ~M bruit was not heard ~T in the abdomen [No Varicosities] : no varicosities [Pedal Pulses Present] : the pedal pulses are present [No Edema] : there was no peripheral edema [No Palpable Aorta] : no palpable aorta [No Extremity Clubbing/Cyanosis] : no extremity clubbing/cyanosis [Soft] : abdomen soft [Non Tender] : non-tender [Non-distended] : non-distended [No Masses] : no abdominal mass palpated [No HSM] : no HSM [Normal Bowel Sounds] : normal bowel sounds [Normal Posterior Cervical Nodes] : no posterior cervical lymphadenopathy [Normal Anterior Cervical Nodes] : no anterior cervical lymphadenopathy [No CVA Tenderness] : no CVA  tenderness [No Spinal Tenderness] : no spinal tenderness [No Joint Swelling] : no joint swelling [Grossly Normal Strength/Tone] : grossly normal strength/tone [No Rash] : no rash [Coordination Grossly Intact] : coordination grossly intact [No Focal Deficits] : no focal deficits [Normal Gait] : normal gait [Deep Tendon Reflexes (DTR)] : deep tendon reflexes were 2+ and symmetric [Normal Affect] : the affect was normal [Normal Insight/Judgement] : insight and judgment were intact [de-identified] : overweight

## 2021-08-24 NOTE — HISTORY OF PRESENT ILLNESS
[Family Member] : family member [FreeTextEntry1] : hospital follow-up  [de-identified] : Patient is a 80 year old female with a past medical history as below who presents for hospital follow-up. Patient was admitted to [facility] on 7/31/21 after a fall. She was ultimately transferred to [facility]. Patient is s/p RUE cast placement. CT Imaging of the Brain revealed subarachnoid hemorrhage in the right temporal and left frontal lobes. CT C-spine did not reveal acute fracture or subluxation. Patient was discharged on 8/4/21 on Clindamycin for an infection of the left hand, 4th digit. She has been seeing a physical therapist at home. She notes upcoming appointment with neurosurgeon, Dr. Lopez. She also notes an upcoming appointment with an orthopedist. Patient notes feeling weak. She notes SOB, particularly when bending over. She notes nausea when eating, but denies dysphagia to solids/liquids. Patient states she is taking all medications as prescribed and denies any adverse reactions or side effects. She denies lightheadedness or dizziness on Losartan Potassium, Hydralazine, and Amlodipine Besylate. She denies diffuse arthralgias or myalgias on Rosuvastatin Calcium. GERD has been well-managed on Omeprazole. Patient has received both doses of the COVID-19 Vaccine.

## 2021-08-24 NOTE — HEALTH RISK ASSESSMENT
[No] : In the past 12 months have you used drugs other than those required for medical reasons? No [1] : 2) Feeling down, depressed, or hopeless for several days (1) [PHQ-2 Positive] : PHQ-2 Positive [I have developed a follow-up plan documented below in the note.] : I have developed a follow-up plan documented below in the note. [Fully functional (bathing, dressing, toileting, transferring, walking, feeding)] : Fully functional (bathing, dressing, toileting, transferring, walking, feeding) [Fully functional (using the telephone, shopping, preparing meals, housekeeping, doing laundry, using] : Fully functional and needs no help or supervision to perform IADLs (using the telephone, shopping, preparing meals, housekeeping, doing laundry, using transportation, managing medications and managing finances) [] : No [Audit-CScore] : 0 [[id number]] : 2 [MammogramComments] : Rx previously given for mammogram and breast US. [BoneDensityDate] : 05/12 [BoneDensityComments] : Osteopenia.  [ColonoscopyComments] : Previously advised to follow up with Dr. Sands to further discuss repeat screening.

## 2021-08-24 NOTE — PLAN
[FreeTextEntry1] : Musculoskeletal\par right wrist fracture - secondary to recent fall - s/p RUE cast placement - continue PT - continue to follow up with orthopedist\par Neurology\par subarachnoid hemorrhage in right temporal and left frontal lobes - follow up with neurosurgeon, Dr. Lopez\par Integumentary\par infection of left hand, 4th digit - continue Clindamycin HCl 300mg p.o. as directed - continue to follow up with Wound Care\par Constitutional\par fatigue/feeling weak - likely secondary to deconditioning - continue PT; discussed the importance of maintaining a well-balanced diet \par Gynecology\par Previously referred to GYN, Dr. Delcid for annual visit - Rx previously given for Mammogram and Breast US\par Gastroenterology\par colonoscopy - initially scheduled with gastroenterologist, Dr. Sands; procedure had to be rescheduled secondary to patient's recent injury and hospitalization  \par GERD - continue Omeprazole 20mg p.o.q.d. as directed - continue antireflux measures\par Cardiology\par hypertension - continue Losartan Potassium 50mg BID p.o.q.d. as directed, Hydralazine HCl 50mg TID p.o.q.d. as directed, and Amlodipine Besylate 10mg p.o.q.d. as directed - continue low sodium diet and weight loss; will continue to monitor BP\par hyperlipidemia - continue Rosuvastatin Calcium 40mg p.o.q.d. as directed - continue low cholesterol/low fat diet \par continue Rosuvastatin Calcium 40mg p.o.q.d. as directed given history of ASCAD \par Continue to follow up with cardiologist, Dr. Johnson\par Endocrinology\par pre-diabetes (hyperglycemia) - continue Metformin HCl ER 500mg 2 tablets BID p.o.q.d. as directed - continue low carbohydrate/low sugar diet \par Vitamin D deficiency - continue Vitamin D 1000 IU p.o.q.d with meals as directed  \par Hematology\par thrombocytosis - continue to follow up with hematologist/oncologist, Dr. Harden\par Psychiatry\par history of anxiety/depression - continue Sertraline HCl 25mg p.o.q.d. as directed - advised to follow up with psychiatrist, Dr. Thibodeaux\par \par Patient to follow up for fasting blood work.

## 2021-08-24 NOTE — ASSESSMENT
[FreeTextEntry1] : Patient is a 80 year old female with a past medical history as above who presents for hospital follow-up.

## 2021-08-26 ENCOUNTER — APPOINTMENT (OUTPATIENT)
Dept: WOUND CARE | Facility: HOSPITAL | Age: 81
End: 2021-08-26
Payer: MEDICARE

## 2021-08-26 ENCOUNTER — OUTPATIENT (OUTPATIENT)
Dept: OUTPATIENT SERVICES | Facility: HOSPITAL | Age: 81
LOS: 1 days | Discharge: HOME | End: 2021-08-26
Payer: MEDICARE

## 2021-08-26 VITALS
BODY MASS INDEX: 29.64 KG/M2 | WEIGHT: 147 LBS | HEART RATE: 75 BPM | TEMPERATURE: 98.2 F | RESPIRATION RATE: 20 BRPM | DIASTOLIC BLOOD PRESSURE: 63 MMHG | HEIGHT: 59 IN | OXYGEN SATURATION: 95 % | SYSTOLIC BLOOD PRESSURE: 108 MMHG

## 2021-08-26 DIAGNOSIS — L03.119 CELLULITIS OF UNSPECIFIED PART OF LIMB: ICD-10-CM

## 2021-08-26 DIAGNOSIS — Z98.89 OTHER SPECIFIED POSTPROCEDURAL STATES: Chronic | ICD-10-CM

## 2021-08-26 DIAGNOSIS — S61.215A LACERATION W/OUT FOREIGN BODY OF LEFT RING FINGER W/OUT DAMAGE TO NAIL, INITIAL ENCOUNTER: ICD-10-CM

## 2021-08-26 DIAGNOSIS — Z98.890 OTHER SPECIFIED POSTPROCEDURAL STATES: Chronic | ICD-10-CM

## 2021-08-26 PROCEDURE — 99213 OFFICE O/P EST LOW 20 MIN: CPT

## 2021-08-26 PROCEDURE — G0463: CPT

## 2021-08-26 NOTE — REVIEW OF SYSTEMS
[FreeTextEntry9] : Right upper extremity immobilized in cast s/p fall. [de-identified] : Laceration to left ring finger

## 2021-08-26 NOTE — ASSESSMENT
[Verbal] : Verbal [Written] : Written [Demo] : Demo [Patient] : Patient [Family member] : Family member [Good - alert, interested, motivated] : Good - alert, interested, motivated [Verbalizes knowledge/Understanding] : Verbalizes knowledge/understanding [Dressing changes] : dressing changes [Skin Care] : skin care [Signs and symptoms of infection] : sign and symptoms of infection [How and When to Call] : how and when to call [Patient responsibility to plan of care] : patient responsibility to plan of care [Stable] : stable [Home] : Home [Not Applicable - Long Term Care/Home Health Agency] : Long Term Care/Home Health Agency: Not Applicable [Walker] : Walker [] : Yes [FreeTextEntry2] : Maintain Skin Integrity\par Discharge\par \par  [FreeTextEntry4] : Patient discharged [FreeTextEntry1] : S/P Fall and recent hospitalization at [facility].  Reports having developed peripheral edema which resulted in hands swelling leading to constriction of left finger by her rings.  She had ring cut off and as a result of the above has a laceration, nearly circumferential around the base of the left ring finger.\par \par Skin remains excoriated.  No erythema or cellulitis.  minimal slough.  \par \par Wash area gently with warm soap and water.  Gently pat dry.\par Daily dressing changes with Xeroform gauze.\par \par 20 min for patient care and medical decision making.\par \par f/u in 2 weeks.

## 2021-08-26 NOTE — PHYSICAL EXAM
[JVD] : no jugular venous distention  [Normal Thyroid] : the thyroid was normal [Normal Breath Sounds] : Normal breath sounds [Normal Heart Sounds] : normal heart sounds [Normal Rate and Rhythm] : normal rate and rhythm [Ankle Swelling (On Exam)] : not present [Varicose Veins Of Lower Extremities] : not present [] : not present [Abdomen Masses] : No abdominal massess [Abdomen Tenderness] : ~T ~M No abdominal tenderness [Tender] : nontender [Enlarged] : not enlarged [Alert] : alert [Oriented to Person] : oriented to person [Oriented to Place] : oriented to place [Calm] : calm [Oriented to Time] : oriented to time [de-identified] : elderly WF, NAD, alert, Ox 3. [FreeTextEntry1] : Hand 4th Digit- CLOSED  [de-identified] : 90% [de-identified] : No treatment required  [de-identified] : Cleansed with Normal saline\par  [TWNoteComboBox1] : Left [TWNoteComboBox4] : None [TWNoteComboBox5] : No [TWNoteComboBox6] : Traumatic [de-identified] : No [de-identified] : Normal [de-identified] : None [de-identified] : None [de-identified] : 100% [de-identified] : No [de-identified] : Primary Dressing [de-identified] : 3x Weekly

## 2021-08-26 NOTE — HISTORY OF PRESENT ILLNESS
[FreeTextEntry1] : 81 yo WF, here for f/u of a laceration to her 4th finger that occurred one month ago from a fall. Pt had developed cellulitiis shortly after that. Pt also fx her right wrist but did not require any surgery. The left 4th finger is now all healed.

## 2021-08-26 NOTE — VITALS
[Pain related to present condition?] : The patient's  pain is not related to present condition. [] : No [de-identified] : 0
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
## 2021-08-27 DIAGNOSIS — Z85.850 PERSONAL HISTORY OF MALIGNANT NEOPLASM OF THYROID: ICD-10-CM

## 2021-08-27 DIAGNOSIS — M81.0 AGE-RELATED OSTEOPOROSIS WITHOUT CURRENT PATHOLOGICAL FRACTURE: ICD-10-CM

## 2021-08-27 DIAGNOSIS — Z79.899 OTHER LONG TERM (CURRENT) DRUG THERAPY: ICD-10-CM

## 2021-08-27 DIAGNOSIS — Z87.01 PERSONAL HISTORY OF PNEUMONIA (RECURRENT): ICD-10-CM

## 2021-08-27 DIAGNOSIS — Z85.828 PERSONAL HISTORY OF OTHER MALIGNANT NEOPLASM OF SKIN: ICD-10-CM

## 2021-08-27 DIAGNOSIS — K21.9 GASTRO-ESOPHAGEAL REFLUX DISEASE WITHOUT ESOPHAGITIS: ICD-10-CM
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
## 2021-08-27 DIAGNOSIS — I35.9 NONRHEUMATIC AORTIC VALVE DISORDER, UNSPECIFIED: ICD-10-CM

## 2021-08-27 DIAGNOSIS — Z87.11 PERSONAL HISTORY OF PEPTIC ULCER DISEASE: ICD-10-CM

## 2021-08-27 DIAGNOSIS — K90.0 CELIAC DISEASE: ICD-10-CM

## 2021-08-27 DIAGNOSIS — E11.59 TYPE 2 DIABETES MELLITUS WITH OTHER CIRCULATORY COMPLICATIONS: ICD-10-CM

## 2021-08-27 DIAGNOSIS — Z79.84 LONG TERM (CURRENT) USE OF ORAL HYPOGLYCEMIC DRUGS: ICD-10-CM
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
## 2021-08-27 DIAGNOSIS — E78.5 HYPERLIPIDEMIA, UNSPECIFIED: ICD-10-CM

## 2021-08-27 DIAGNOSIS — F41.9 ANXIETY DISORDER, UNSPECIFIED: ICD-10-CM

## 2021-08-27 DIAGNOSIS — S61.215D LACERATION WITHOUT FOREIGN BODY OF LEFT RING FINGER WITHOUT DAMAGE TO NAIL, SUBSEQUENT ENCOUNTER: ICD-10-CM
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
## 2021-08-27 DIAGNOSIS — Z86.008 PERSONAL HISTORY OF IN-SITU NEOPLASM OF OTHER SITE: ICD-10-CM

## 2021-08-27 DIAGNOSIS — Z98.890 OTHER SPECIFIED POSTPROCEDURAL STATES: ICD-10-CM

## 2021-08-30 ENCOUNTER — RX RENEWAL (OUTPATIENT)
Age: 81
End: 2021-08-30

## 2021-09-02 ENCOUNTER — OUTPATIENT (OUTPATIENT)
Dept: OUTPATIENT SERVICES | Facility: HOSPITAL | Age: 81
LOS: 1 days | End: 2021-09-02
Payer: MEDICARE

## 2021-09-02 ENCOUNTER — APPOINTMENT (OUTPATIENT)
Dept: CT IMAGING | Facility: CLINIC | Age: 81
End: 2021-09-02
Payer: MEDICARE

## 2021-09-02 DIAGNOSIS — Z98.89 OTHER SPECIFIED POSTPROCEDURAL STATES: Chronic | ICD-10-CM

## 2021-09-02 DIAGNOSIS — Z98.890 OTHER SPECIFIED POSTPROCEDURAL STATES: Chronic | ICD-10-CM

## 2021-09-02 DIAGNOSIS — S06.6X9A TRAUMATIC SUBARACHNOID HEMORRHAGE WITH LOSS OF CONSCIOUSNESS OF UNSPECIFIED DURATION, INITIAL ENCOUNTER: ICD-10-CM

## 2021-09-02 PROCEDURE — 70450 CT HEAD/BRAIN W/O DYE: CPT | Mod: 26,MH

## 2021-09-02 PROCEDURE — 70450 CT HEAD/BRAIN W/O DYE: CPT

## 2021-09-13 ENCOUNTER — APPOINTMENT (OUTPATIENT)
Dept: INTERNAL MEDICINE | Facility: CLINIC | Age: 81
End: 2021-09-13
Payer: MEDICARE

## 2021-09-13 VITALS
HEART RATE: 98 BPM | WEIGHT: 144 LBS | OXYGEN SATURATION: 98 % | BODY MASS INDEX: 29.08 KG/M2 | SYSTOLIC BLOOD PRESSURE: 110 MMHG | RESPIRATION RATE: 20 BRPM | DIASTOLIC BLOOD PRESSURE: 68 MMHG

## 2021-09-13 DIAGNOSIS — S06.6X9A TRAUMATIC SUBARACHNOID HEMORRHAGE WITH LOSS OF CONSCIOUSNESS OF UNSPECIFIED DURATION, INITIAL ENCOUNTER: ICD-10-CM

## 2021-09-13 PROCEDURE — 99214 OFFICE O/P EST MOD 30 MIN: CPT | Mod: 25

## 2021-09-13 PROCEDURE — 36415 COLL VENOUS BLD VENIPUNCTURE: CPT

## 2021-09-13 NOTE — PLAN
[FreeTextEntry1] : Constitutional\par fatigue/feeling weak - possibly secondary to deconditioning/worsening depression - check CBC, CMP, and TSH - continue PT; previously discussed the importance of maintaining a well-balanced diet \par Cardiology\par hypertension - continue Losartan Potassium 50mg BID p.o.q.d. as directed, Hydralazine HCl 50mg TID p.o.q.d. as directed, and Amlodipine Besylate 10mg p.o.q.d. as directed - continue low sodium diet and weight loss; will continue to monitor BP\par hyperlipidemia - continue Rosuvastatin Calcium 40mg p.o.q.d. as directed - continue low cholesterol/low fat diet - check FLP and LFTs\par continue Rosuvastatin Calcium 40mg p.o.q.d. as directed given history of ASCAD \par Continue to follow up with cardiologist, Dr. Johnson\par Endocrinology\par diabetes - continue Metformin HCl ER 500mg 2 tablets BID p.o.q.d. as directed - continue low carbohydrate/low sugar diet - check hemoglobin A1C and Urine Microalbumin/Creatinine Ratio\par Vitamin D deficiency - continue Vitamin D 1000 IU p.o.q.d with meals as directed - check Vitamin D\par Gynecology\par Previously referred to GYN, Dr. Delcid for annual visit - Rx previously given for Mammogram and Breast US\par Gastroenterology\par colonoscopy - initially scheduled with gastroenterologist, Dr. Sands; procedure had to be rescheduled secondary to patient's recent injury and hospitalization  \par GERD - continue Omeprazole 20mg p.o.q.d. as directed - continue antireflux measures\par Hematology\par thrombocytosis - check CBC - continue to follow up with hematologist/oncologist, Dr. Harden\par Musculoskeletal\par right wrist fracture - secondary to recent fall - s/p RUE cast placement - continue PT - continue to follow up with orthopedist\par Neurology\par subarachnoid hemorrhage in right temporal and left frontal lobes - results of CT Brain were reviewed with patient; follow up with neurology\par Integumentary\par infection of left hand, 4th digit - continue to follow up with Wound Care\par Psychiatry\par decreased appetite and sleeping more - likely secondary to worsening depression - continue Sertraline HCl 25mg p.o.q.d. as directed, Rx filled - check Serum Vitamin B12 - advised to follow up with psychiatrist, Dr. Thibodeaux\par Immunization\par flu vaccine - discussed, declined\par \par check female panel, hemoglobin A1C, Serum Vitamin B12, and Urine Microalbumin/Creatinine Ratio

## 2021-09-13 NOTE — PHYSICAL EXAM
[No Acute Distress] : no acute distress [Well Nourished] : well nourished [Well Developed] : well developed [Well-Appearing] : well-appearing [Normal Sclera/Conjunctiva] : normal sclera/conjunctiva [PERRL] : pupils equal round and reactive to light [EOMI] : extraocular movements intact [Normal Outer Ear/Nose] : the outer ears and nose were normal in appearance [Normal Oropharynx] : the oropharynx was normal [No JVD] : no jugular venous distention [No Lymphadenopathy] : no lymphadenopathy [Supple] : supple [Thyroid Normal, No Nodules] : the thyroid was normal and there were no nodules present [No Respiratory Distress] : no respiratory distress  [No Accessory Muscle Use] : no accessory muscle use [Clear to Auscultation] : lungs were clear to auscultation bilaterally [Normal Rate] : normal rate  [Regular Rhythm] : with a regular rhythm [Normal S1, S2] : normal S1 and S2 [No Murmur] : no murmur heard [No Carotid Bruits] : no carotid bruits [No Abdominal Bruit] : a ~M bruit was not heard ~T in the abdomen [No Varicosities] : no varicosities [Pedal Pulses Present] : the pedal pulses are present [No Edema] : there was no peripheral edema [No Palpable Aorta] : no palpable aorta [No Extremity Clubbing/Cyanosis] : no extremity clubbing/cyanosis [Non Tender] : non-tender [Soft] : abdomen soft [Non-distended] : non-distended [No Masses] : no abdominal mass palpated [No HSM] : no HSM [Normal Bowel Sounds] : normal bowel sounds [Normal Anterior Cervical Nodes] : no anterior cervical lymphadenopathy [Normal Posterior Cervical Nodes] : no posterior cervical lymphadenopathy [No CVA Tenderness] : no CVA  tenderness [No Spinal Tenderness] : no spinal tenderness [No Joint Swelling] : no joint swelling [Grossly Normal Strength/Tone] : grossly normal strength/tone [No Rash] : no rash [Coordination Grossly Intact] : coordination grossly intact [No Focal Deficits] : no focal deficits [Normal Gait] : normal gait [Deep Tendon Reflexes (DTR)] : deep tendon reflexes were 2+ and symmetric [Normal Affect] : the affect was normal [Normal Insight/Judgement] : insight and judgment were intact [de-identified] : overweight

## 2021-09-13 NOTE — HEALTH RISK ASSESSMENT
[No] : In the past 12 months have you used drugs other than those required for medical reasons? No [1] : 2) Feeling down, depressed, or hopeless for several days (1) [PHQ-2 Positive] : PHQ-2 Positive [I have developed a follow-up plan documented below in the note.] : I have developed a follow-up plan documented below in the note. [Fully functional (bathing, dressing, toileting, transferring, walking, feeding)] : Fully functional (bathing, dressing, toileting, transferring, walking, feeding) [Fully functional (using the telephone, shopping, preparing meals, housekeeping, doing laundry, using] : Fully functional and needs no help or supervision to perform IADLs (using the telephone, shopping, preparing meals, housekeeping, doing laundry, using transportation, managing medications and managing finances) [] : No [Audit-CScore] : 0 [[id number]] : 2 [MammogramComments] : Rx previously given for Mammogram and Breast US. [BoneDensityDate] : 05/12 [BoneDensityComments] : Osteopenia.  [ColonoscopyComments] : Previously advised to follow up with Dr. Sands to further discuss repeat screening.

## 2021-09-13 NOTE — HISTORY OF PRESENT ILLNESS
[FreeTextEntry1] : fasting blood work, Rx refill, and general follow-up\par  [de-identified] : Patient is a 80 year old female with a past medical history as below who presents for fasting blood work, Rx refill, and general follow-up. Patient states she is taking all medications as prescribed and denies any adverse reactions or side effects. She denies lightheadedness or dizziness on Losartan Potassium, Hydralazine, and Amlodipine Besylate. She denies diffuse arthralgias or myalgias on Rosuvastatin Calcium. GERD has been well-managed on Omeprazole. CT Brain dated 9/2/21 revealed the following: acute cerebral hemorrhage has demonstrated expected evolutionary changes. Patient has noted a decreased appetite over the course of the past 7-10 days. She has also intermittently noted nausea after eating. She notes feeling weak/lack of energy. She notes sleeping more than usual. She last saw psychiatrist, Dr. Thibodeaux on 7/27/21. Patient denies any falls since her hospitalization. Patient does not receive the flu vaccine annually. She has received both doses of the COVID-19 Vaccine (Pfizer). Patient requests Rx refill for Sertraline.

## 2021-09-13 NOTE — ASSESSMENT
[FreeTextEntry1] : Patient is a 80 year old female with a past medical history as above who presents for fasting blood work, Rx refill, and general follow-up.\par

## 2021-09-13 NOTE — REVIEW OF SYSTEMS
[Fatigue] : fatigue [Nausea] : nausea [Negative] : Heme/Lymph [Depression] : depression [FreeTextEntry2] : feeling weak  [FreeTextEntry7] : decreased appetite  [de-identified] : sleeping more than usual

## 2021-09-13 NOTE — ADDENDUM
[FreeTextEntry1] : I, Kevin Castaneda, acted solely as scribe for Dr. Edu Boston DO on this date 09/13/2021 10:00AM .\par \par All medical record entries made by the Scribe were at my, Dr. Edu Boston DO direction and personally dictated by me on 09/13/2021 10:00AM. I have reviewed the chart and agree that the record accurately reflects my personal performance of the history, physical exam, assessment and plan. I have also personally directed, reviewed and agreed with the chart.\par

## 2021-09-18 ENCOUNTER — NON-APPOINTMENT (OUTPATIENT)
Age: 81
End: 2021-09-18

## 2021-09-20 ENCOUNTER — NON-APPOINTMENT (OUTPATIENT)
Age: 81
End: 2021-09-20

## 2021-09-22 LAB
25(OH)D3 SERPL-MCNC: 61.5 NG/ML
ALBUMIN SERPL ELPH-MCNC: 4.4 G/DL
ALP BLD-CCNC: 122 U/L
ALT SERPL-CCNC: 11 U/L
ANION GAP SERPL CALC-SCNC: 16 MMOL/L
AST SERPL-CCNC: 14 U/L
BASOPHILS # BLD AUTO: 0.1 K/UL
BASOPHILS NFR BLD AUTO: 0.8 %
BILIRUB SERPL-MCNC: 0.3 MG/DL
BUN SERPL-MCNC: 25 MG/DL
CALCIUM SERPL-MCNC: 10.3 MG/DL
CHLORIDE SERPL-SCNC: 105 MMOL/L
CHOLEST SERPL-MCNC: 112 MG/DL
CO2 SERPL-SCNC: 17 MMOL/L
CREAT SERPL-MCNC: 2.06 MG/DL
EOSINOPHIL # BLD AUTO: 0.26 K/UL
EOSINOPHIL NFR BLD AUTO: 2.2 %
ESTIMATED AVERAGE GLUCOSE: 131 MG/DL
GLUCOSE SERPL-MCNC: 128 MG/DL
HBA1C MFR BLD HPLC: 6.2 %
HCT VFR BLD CALC: 40.5 %
HDLC SERPL-MCNC: 44 MG/DL
HGB BLD-MCNC: 12.6 G/DL
IMM GRANULOCYTES NFR BLD AUTO: 0.6 %
LDLC SERPL CALC-MCNC: 30 MG/DL
LYMPHOCYTES # BLD AUTO: 2.26 K/UL
LYMPHOCYTES NFR BLD AUTO: 19.1 %
MAN DIFF?: NORMAL
MCHC RBC-ENTMCNC: 29.4 PG
MCHC RBC-ENTMCNC: 31.1 GM/DL
MCV RBC AUTO: 94.4 FL
MONOCYTES # BLD AUTO: 1.1 K/UL
MONOCYTES NFR BLD AUTO: 9.3 %
NEUTROPHILS # BLD AUTO: 8.07 K/UL
NEUTROPHILS NFR BLD AUTO: 68 %
NONHDLC SERPL-MCNC: 68 MG/DL
PLATELET # BLD AUTO: 432 K/UL
POTASSIUM SERPL-SCNC: 4.1 MMOL/L
PROT SERPL-MCNC: 7.1 G/DL
RBC # BLD: 4.29 M/UL
RBC # FLD: 14.9 %
SODIUM SERPL-SCNC: 139 MMOL/L
TRIGL SERPL-MCNC: 189 MG/DL
TSH SERPL-ACNC: 1.57 UIU/ML
WBC # FLD AUTO: 11.86 K/UL

## 2021-10-04 ENCOUNTER — APPOINTMENT (OUTPATIENT)
Dept: CARDIOLOGY | Facility: CLINIC | Age: 81
End: 2021-10-04
Payer: MEDICARE

## 2021-10-04 ENCOUNTER — NON-APPOINTMENT (OUTPATIENT)
Age: 81
End: 2021-10-04

## 2021-10-04 VITALS
HEART RATE: 73 BPM | OXYGEN SATURATION: 95 % | SYSTOLIC BLOOD PRESSURE: 156 MMHG | DIASTOLIC BLOOD PRESSURE: 78 MMHG | BODY MASS INDEX: 29.43 KG/M2 | HEIGHT: 59 IN | WEIGHT: 146 LBS

## 2021-10-04 VITALS — SYSTOLIC BLOOD PRESSURE: 138 MMHG | DIASTOLIC BLOOD PRESSURE: 70 MMHG

## 2021-10-04 PROCEDURE — 99214 OFFICE O/P EST MOD 30 MIN: CPT

## 2021-10-04 PROCEDURE — 93000 ELECTROCARDIOGRAM COMPLETE: CPT

## 2021-10-04 RX ORDER — METFORMIN ER 500 MG 500 MG/1
500 TABLET ORAL
Qty: 360 | Refills: 1 | Status: DISCONTINUED | COMMUNITY
Start: 2019-08-15 | End: 2021-10-04

## 2021-10-04 NOTE — PHYSICAL EXAM
[Well Groomed] : well groomed [General Appearance - In No Acute Distress] : no acute distress [Normal Conjunctiva] : the conjunctiva exhibited no abnormalities [Eyelids - No Xanthelasma] : the eyelids demonstrated no xanthelasmas [Normal Oral Mucosa] : normal oral mucosa [No Oral Pallor] : no oral pallor [No Oral Cyanosis] : no oral cyanosis [Normal Jugular Venous A Waves Present] : normal jugular venous A waves present [Normal Jugular Venous V Waves Present] : normal jugular venous V waves present [No Jugular Venous Costello A Waves] : no jugular venous costello A waves [Respiration, Rhythm And Depth] : normal respiratory rhythm and effort [Exaggerated Use Of Accessory Muscles For Inspiration] : no accessory muscle use [Auscultation Breath Sounds / Voice Sounds] : lungs were clear to auscultation bilaterally [Abdomen Soft] : soft [Abdomen Tenderness] : non-tender [Abdomen Mass (___ Cm)] : no abdominal mass palpated [Abnormal Walk] : normal gait [Gait - Sufficient For Exercise Testing] : the gait was sufficient for exercise testing [Nail Clubbing] : no clubbing of the fingernails [Cyanosis, Localized] : no localized cyanosis [Petechial Hemorrhages (___cm)] : no petechial hemorrhages [Skin Color & Pigmentation] : normal skin color and pigmentation [] : no rash [No Venous Stasis] : no venous stasis [Skin Lesions] : no skin lesions [No Skin Ulcers] : no skin ulcer [No Xanthoma] : no  xanthoma was observed [Oriented To Time, Place, And Person] : oriented to person, place, and time [Affect] : the affect was normal [Mood] : the mood was normal [No Anxiety] : not feeling anxious [Normal Rate] : normal [Rhythm Regular] : regular [Normal S1] : normal S1 [Normal S2] : normal S2 [No Gallop] : no gallop heard [II] : a grade 2 [I] : a grade 1 [1+] : left 1+ [2+] : left 2+ [No Pitting Edema] : no pitting edema present [FreeTextEntry1] : + tenderness in left flank [Right Carotid Bruit] : no bruit heard over the right carotid [Left Carotid Bruit] : no bruit heard over the left carotid [Bruit] : no bruit heard

## 2021-10-04 NOTE — DISCUSSION/SUMMARY
[FreeTextEntry1] : 81-year-old woman with a history as listed above who presents today for a followup cardiac evaluation.\par \par Yara unfortunately had a mechanical fall resulting in a SAH. She was taken off of the ASA.  \par \par She relatively stable from a CV POV. She denies any anginal symptoms. Clinically she is euvolemic on  She had an nuclear stress test unrevealing for ischemia on 7/2020.  She has more prominent Q waves inferolaterall. She will get a 2d echo to assess for any  new structural heart disease, changes in valvular and ventricular function. \par \par Her heart rate is much better off of the Coreg. Her BP is elevated today again.  She will continue Losartan 50mg q12, Norvasc  10 mg daily. She will increase her Hydralazine to   50mg q8. She does not want to try any type of diuretic.  She has a bit of KERRIE at this point. She may be dry. I have encourage her to hydrate more. She understands and want to continue to try lifestyle modification.  \par \par She has coronary calcifications. Her goal LDL should be less than 100.  She will continue  Crestor 40mg HS. \par \par She will followup with heme for her thrombocytosis. She will speak to heme about resuming ASA as her benefit from a primary prevention from a CV POV likely does not outweigh risk in this situation. \par \par Exercise and diet counseling was performed in order to reduce her future cardiovascular risk. \par She will followup with me in 3 months time or sooner if necessary.

## 2021-10-04 NOTE — HISTORY OF PRESENT ILLNESS
[FreeTextEntry1] : 81-year-old woman with a history of anxiety, hypertension, hyperlipidemia, coronary calficications. \par \par She had presented to [facility] with a pneumonia. During her hospitalization she was found to have lateral T wave inversions on EKG which were worse than previously seen. She had echocardiogram that showed normal left ventricular function. Her cardiac enzymes were negative. she had a CAT scan of the chest which was also revealing for coronary artery calcifications and calcifications to the aorta and its main branches. she had nuclear stress test which was unremarkable.\par She had a syncopal episode when she was on Chlorthalidone. She was taken to  in early 2019 and her diuretic was stopped.  \par \par She had an echo in 9/2019 that showed normal systolic LV function without any significant other findings, including no significant valvular disease. \par \par She now presents for a followup visit.\par Since her last visit, she was hospitalized at [facility] on 7/31/21 for  right temporal and left frontal subarachnoid hemorrhage and right wrist Fx after trip and fall outside of [employer]. Admitted to ICU 7/31. Repeat CT head done on 8/1/21 reveals stable bleed.  I personally reviewed all of the hospital records available to me at this time, which included but are not limited to the discharge summary, labs and imaging reports. S\par \par She is complaining of feeling more tired. She is trying to stay active but going to her friends house. She is planning on going to PT for her wrist. \par She is still complaining of ENNIS with stairs that is unchanged from previous. She denies any anginal symptoms. She denies any  PND, orthopnea,  edema,  syncope. She denies any blurry vision, headaches or recent stroke like symptoms. she's been compliant with her medications. Though only taking Hydralazine q12.

## 2021-10-04 NOTE — CARDIOLOGY SUMMARY
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  [de-identified] : nuclear stress test unrevealing for ischemia on 7/2020 [de-identified] : 7/29/20 that showed normal systolic LV function without any significant other findings, including no significant valvular disease.

## 2021-10-06 ENCOUNTER — APPOINTMENT (OUTPATIENT)
Dept: INTERNAL MEDICINE | Facility: CLINIC | Age: 81
End: 2021-10-06
Payer: MEDICARE

## 2021-10-06 VITALS
HEIGHT: 59 IN | RESPIRATION RATE: 16 BRPM | BODY MASS INDEX: 29.03 KG/M2 | HEART RATE: 61 BPM | SYSTOLIC BLOOD PRESSURE: 130 MMHG | WEIGHT: 144 LBS | TEMPERATURE: 97.2 F | DIASTOLIC BLOOD PRESSURE: 66 MMHG | OXYGEN SATURATION: 96 %

## 2021-10-06 DIAGNOSIS — Z12.39 ENCOUNTER FOR OTHER SCREENING FOR MALIGNANT NEOPLASM OF BREAST: ICD-10-CM

## 2021-10-06 DIAGNOSIS — R74.8 ABNORMAL LEVELS OF OTHER SERUM ENZYMES: ICD-10-CM

## 2021-10-06 DIAGNOSIS — D72.829 ELEVATED WHITE BLOOD CELL COUNT, UNSPECIFIED: ICD-10-CM

## 2021-10-06 PROCEDURE — 36415 COLL VENOUS BLD VENIPUNCTURE: CPT

## 2021-10-06 PROCEDURE — 99214 OFFICE O/P EST MOD 30 MIN: CPT | Mod: 25

## 2021-10-06 NOTE — HEALTH RISK ASSESSMENT
[] : No [No] : In the past 12 months have you used drugs other than those required for medical reasons? No [1] : 2) Feeling down, depressed, or hopeless for several days (1) [PHQ-2 Positive] : PHQ-2 Positive [I have developed a follow-up plan documented below in the note.] : I have developed a follow-up plan documented below in the note. [Audit-CScore] : 0 [PGB5Ecbty] : 2 [Fully functional (bathing, dressing, toileting, transferring, walking, feeding)] : Fully functional (bathing, dressing, toileting, transferring, walking, feeding) [Fully functional (using the telephone, shopping, preparing meals, housekeeping, doing laundry, using] : Fully functional and needs no help or supervision to perform IADLs (using the telephone, shopping, preparing meals, housekeeping, doing laundry, using transportation, managing medications and managing finances) [MammogramComments] : Rx previously given for Mammogram and Breast US. [BoneDensityDate] : 05/12 [BoneDensityComments] : Osteopenia.  [ColonoscopyComments] : Previously advised to follow up with Dr. Sands to further discuss repeat screening.

## 2021-10-06 NOTE — HISTORY OF PRESENT ILLNESS
[FreeTextEntry1] : fasting blood work, Rx refill, and general follow-up\par  [de-identified] : Patient is a 81 year old female with a past medical history as below who presents for follow up blood work and general follow-up. Patient states she is taking all medications as prescribed and denies any adverse reactions or side effects. She denies lightheadedness or dizziness on Losartan Potassium, Hydralazine, and Amlodipine Besylate. She denies diffuse arthralgias or myalgias on Rosuvastatin Calcium. GERD has been well-managed on Omeprazole. Patient's recent blood revealed various abnormalities including renal insufficiency, elevated WBC count and platelet counts and elevated alkaline phosphatase.  At that time, she was feeling ill, nausea and weak with fatigue and without an appetite.  We temporarily discontinued the metformin secondary to the elevated creatinine and her clinical symptoms have improved markedly ever since. She is eating and drinking more and has a bit more energy,  Of note, she had been on metformin for a while and it was not a new medication.  She states that she did not take a significant amount of NSAIDS for pain reduction when dealing with her right wrist fracture. She has not seen her psychiatrist, Dr. Thibodeaux in a long time and will make it a priority to make sure that she gets in to see him ASAP.

## 2021-10-06 NOTE — PHYSICAL EXAM
[No Acute Distress] : no acute distress [Well Nourished] : well nourished [Well Developed] : well developed [Well-Appearing] : well-appearing [Normal Sclera/Conjunctiva] : normal sclera/conjunctiva [PERRL] : pupils equal round and reactive to light [EOMI] : extraocular movements intact [Normal Outer Ear/Nose] : the outer ears and nose were normal in appearance [Normal Oropharynx] : the oropharynx was normal [No JVD] : no jugular venous distention [No Lymphadenopathy] : no lymphadenopathy [Supple] : supple [Thyroid Normal, No Nodules] : the thyroid was normal and there were no nodules present [No Respiratory Distress] : no respiratory distress  [No Accessory Muscle Use] : no accessory muscle use [Clear to Auscultation] : lungs were clear to auscultation bilaterally [Normal Rate] : normal rate  [Regular Rhythm] : with a regular rhythm [Normal S1, S2] : normal S1 and S2 [No Murmur] : no murmur heard [No Carotid Bruits] : no carotid bruits [No Abdominal Bruit] : a ~M bruit was not heard ~T in the abdomen [No Varicosities] : no varicosities [Pedal Pulses Present] : the pedal pulses are present [No Edema] : there was no peripheral edema [No Palpable Aorta] : no palpable aorta [No Extremity Clubbing/Cyanosis] : no extremity clubbing/cyanosis [Soft] : abdomen soft [Non Tender] : non-tender [Non-distended] : non-distended [No Masses] : no abdominal mass palpated [No HSM] : no HSM [Normal Bowel Sounds] : normal bowel sounds [Normal Posterior Cervical Nodes] : no posterior cervical lymphadenopathy [Normal Anterior Cervical Nodes] : no anterior cervical lymphadenopathy [No CVA Tenderness] : no CVA  tenderness [No Spinal Tenderness] : no spinal tenderness [No Joint Swelling] : no joint swelling [Grossly Normal Strength/Tone] : grossly normal strength/tone [No Rash] : no rash [Coordination Grossly Intact] : coordination grossly intact [No Focal Deficits] : no focal deficits [Normal Gait] : normal gait [Deep Tendon Reflexes (DTR)] : deep tendon reflexes were 2+ and symmetric [Normal Affect] : the affect was normal [Normal Insight/Judgement] : insight and judgment were intact [de-identified] : overweight

## 2021-10-06 NOTE — PLAN
[FreeTextEntry1] : nephrology\par renal insufficiency-metformin on hold-no NSAIDS, increase hydration and po intake, check CBC, CMP, consider renal US and nephrology consultation pendinf blood results\par Cardiology\par hypertension - continue Losartan Potassium 50mg BID p.o.q.d. as directed, Hydralazine HCl 50mg TID p.o.q.d. as directed, and Amlodipine Besylate 10mg p.o.q.d. as directed - continue low sodium diet and weight loss; will continue to monitor BP\par hyperlipidemia - continue Rosuvastatin Calcium 40mg p.o.q.d. as directed - continue low cholesterol/low fat diet - check FLP and LFTs\par continue Rosuvastatin Calcium 40mg p.o.q.d. as directed given history of ASCAD \par Continue to follow up with cardiologist, Dr. Johnson\par Endocrinology\par diabetes - off metformin, must watch carbohydrates in diet- continue low carbohydrate/low sugar diet \par Vitamin D deficiency - continue Vitamin D 1000 IU p.o.q.d with meals as directed \par Gynecology\par Previously referred to GYN, Dr. Delcid for annual visit - Rx previously given for Mammogram and Breast US\par Gastroenterology\par colonoscopy - initially scheduled with gastroenterologist, Dr. Sands; procedure had to be rescheduled secondary to patient's recent injury and hospitalization  \par GERD - continue Omeprazole 20mg p.o.q.d. as directed - continue antireflux measures\par elevated alk phos-checl alk phos isoenzymes and GGT\par Hematology\par thrombocytosis - check CBC - continue to follow up with hematologist/oncologist, Dr. Harden\par leukocytosis-check CBC\par Musculoskeletal\par right wrist fracture - secondary to recent fall - s/p RUE cast placement - continue PT - continue to follow up with orthopedist\par Neurology\par subarachnoid hemorrhage in right temporal and left frontal lobes - results of CT Brain were reviewed with patient; follow up with neurology\par Integumentary\par infection of left hand, 4th digit - continue to follow up with Wound Care\par Psychiatry\par decreased appetite and sleeping more - likely secondary to worsening depression - continue Sertraline HCl 25mg p.o.q.d. as directed, Rx filled - check Serum Vitamin B12 - advised to follow up with psychiatrist, Dr. Thibodeaux\par Immunization\par flu vaccine - discussed, declined\par \par check female panel, hemoglobin A1C, Serum Vitamin B12, and Urine Microalbumin/Creatinine Ratio

## 2021-10-06 NOTE — REVIEW OF SYSTEMS
[Fatigue] : fatigue [Nausea] : nausea [Depression] : depression [Negative] : Heme/Lymph [FreeTextEntry2] : feeling weak  [FreeTextEntry7] : decreased appetite

## 2021-10-10 ENCOUNTER — NON-APPOINTMENT (OUTPATIENT)
Age: 81
End: 2021-10-10

## 2021-10-10 LAB
ALBUMIN SERPL ELPH-MCNC: 3.9 G/DL
ALP BLD-CCNC: 96 U/L
ALT SERPL-CCNC: 14 U/L
ANION GAP SERPL CALC-SCNC: 14 MMOL/L
AST SERPL-CCNC: 17 U/L
BASOPHILS # BLD AUTO: 0.11 K/UL
BASOPHILS NFR BLD AUTO: 1.2 %
BILIRUB SERPL-MCNC: 0.3 MG/DL
BUN SERPL-MCNC: 12 MG/DL
CALCIUM SERPL-MCNC: 9.2 MG/DL
CHLORIDE SERPL-SCNC: 105 MMOL/L
CO2 SERPL-SCNC: 23 MMOL/L
CREAT SERPL-MCNC: 1.29 MG/DL
EOSINOPHIL # BLD AUTO: 0.28 K/UL
EOSINOPHIL NFR BLD AUTO: 3.1 %
GGT SERPL-CCNC: 12 U/L
GLUCOSE SERPL-MCNC: 113 MG/DL
HCT VFR BLD CALC: 36.6 %
HGB BLD-MCNC: 11.1 G/DL
IMM GRANULOCYTES NFR BLD AUTO: 0.4 %
LYMPHOCYTES # BLD AUTO: 2.38 K/UL
LYMPHOCYTES NFR BLD AUTO: 26 %
MAN DIFF?: NORMAL
MCHC RBC-ENTMCNC: 29.3 PG
MCHC RBC-ENTMCNC: 30.3 GM/DL
MCV RBC AUTO: 96.6 FL
MONOCYTES # BLD AUTO: 0.92 K/UL
MONOCYTES NFR BLD AUTO: 10.1 %
NEUTROPHILS # BLD AUTO: 5.41 K/UL
NEUTROPHILS NFR BLD AUTO: 59.2 %
PLATELET # BLD AUTO: 336 K/UL
POTASSIUM SERPL-SCNC: 3.8 MMOL/L
PROT SERPL-MCNC: 6.4 G/DL
RBC # BLD: 3.79 M/UL
RBC # FLD: 15.7 %
SODIUM SERPL-SCNC: 141 MMOL/L
WBC # FLD AUTO: 9.14 K/UL

## 2021-10-11 LAB
ALP BLD-CCNC: 97 IU/L
ALP BONE CFR SERPL: 21 %
ALP INTEST CFR SERPL: 0 %
ALP LIVER CFR SERPL: 79 %

## 2021-10-28 ENCOUNTER — OUTPATIENT (OUTPATIENT)
Dept: OUTPATIENT SERVICES | Facility: HOSPITAL | Age: 81
LOS: 1 days | Discharge: HOME | End: 2021-10-28

## 2021-10-28 DIAGNOSIS — Z98.89 OTHER SPECIFIED POSTPROCEDURAL STATES: Chronic | ICD-10-CM

## 2021-10-28 DIAGNOSIS — D47.3 ESSENTIAL (HEMORRHAGIC) THROMBOCYTHEMIA: ICD-10-CM

## 2021-10-28 DIAGNOSIS — Z98.890 OTHER SPECIFIED POSTPROCEDURAL STATES: Chronic | ICD-10-CM

## 2021-11-01 ENCOUNTER — APPOINTMENT (OUTPATIENT)
Age: 81
End: 2021-11-01
Payer: MEDICARE

## 2021-11-01 VITALS
BODY MASS INDEX: 29.51 KG/M2 | HEART RATE: 71 BPM | SYSTOLIC BLOOD PRESSURE: 147 MMHG | WEIGHT: 146.39 LBS | OXYGEN SATURATION: 99 % | TEMPERATURE: 96.8 F | RESPIRATION RATE: 17 BRPM | DIASTOLIC BLOOD PRESSURE: 77 MMHG | HEIGHT: 58.94 IN

## 2021-11-01 PROCEDURE — 99213 OFFICE O/P EST LOW 20 MIN: CPT

## 2021-11-01 NOTE — ASSESSMENT
[FreeTextEntry1] : RTC in 4 months; Ms. KERR was advised to contact the office should she have any additional questions or concerns in the interim.\par \par \par

## 2021-11-01 NOTE — HISTORY OF PRESENT ILLNESS
[de-identified] : 81F with PMHx is notable for HTN, HLD, esophageal stricture, bronchitis, ASHD, anxiety/ depression, returning for hematologic follow-up of JAK2 positive thrombocytosis. \par \par CASE SYNOPSIS:\par 4/29/21 : bone marrow biopsy: myeloproliferative neoplasm characterized by megakaryocyte hyperplasia with occasional abnormal forms, no significant fibrosis and no increase in blast population.   [FreeTextEntry1] : expectant surveillance\par \par  [de-identified] : Short-term hematologic follow-up after bone marrow biopsy confirmed an underlying myeloproliferative neoplasm (essential thrombocytosis).  Patient was kept under observation close to normal platelet count.  On October 6, 2021 her CBC shows normal WBC (9.14K) and normal platelet count (336,000). Ms. KERR tolerated procedure well.  Denies venous thrombotic events or new constitutional symptoms.  Compliant with the rest of medication; noticed improvement in overall status after Metformin discontinued by Dr. Boston. In July 2021 patient had sustained a fall while outside her house fracturing right wrist. She required immobilization, but no surgery. Patient is still receiving PT in ambulatory.\par

## 2021-11-01 NOTE — REVIEW OF SYSTEMS
[Patient Intake Form Reviewed] : Patient intake form was reviewed [Anxiety] : anxiety [Depression] : depression [Negative] : Neurological [Night Sweats] : no night sweats [Fatigue] : no fatigue [Recent Change In Weight] : ~T no recent weight change [de-identified] : under the care of psychiatrist for depression

## 2021-11-01 NOTE — PHYSICAL EXAM
[Restricted in physically strenuous activity but ambulatory and able to carry out work of a light or sedentary nature] : Status 1- Restricted in physically strenuous activity but ambulatory and able to carry out work of a light or sedentary nature, e.g., light house work, office work [Normal] : normal appearance, no rash, nodules, vesicles, ulcers, erythema [de-identified] : right

## 2022-01-06 ENCOUNTER — APPOINTMENT (OUTPATIENT)
Dept: CARDIOLOGY | Facility: CLINIC | Age: 82
End: 2022-01-06
Payer: MEDICARE

## 2022-01-06 PROCEDURE — 93306 TTE W/DOPPLER COMPLETE: CPT

## 2022-01-12 NOTE — ED PROCEDURE NOTE - CPROC ED TOLERANCE1
Positive for covid 19 testing after returning from the [place].     Reports appt's are scheduled for family members for 1/14/22, inquiring on quarantine for family and self.     Notified of new CDC Guidelines    Patient verbalized understanding.     COVID 19 Nurse Triage Plan/Patient Instructions    Please be aware that novel coronavirus (COVID-19) may be circulating in the community. If you develop symptoms such as fever, cough, or SOB or if you have concerns about the presence of another infection including coronavirus (COVID-19), please contact your health care provider or visit [email or web address].     Disposition/Instructions    Home care recommended. Follow home care protocol based instructions.    Thank you for taking steps to prevent the spread of this virus.  o Limit your contact with others.  o Wear a simple mask to cover your cough.  o Wash your hands well and often.    Resources    [facility]: About COVID-19: [email or web address]    CDC: What to Do If You're Sick: www.cdc.gov/coronavirus/2019-ncov/about/steps-when-sick.html    CDC: Ending Home Isolation: www.cdc.gov/coronavirus/2019-ncov/hcp/disposition-in-home-patients.html     CDC: Caring for Someone: www.cdc.gov/coronavirus/2019-ncov/if-you-are-sick/care-for-someone.html     [facility]: Interim Guidance for Hospital Discharge to Home: www.health.[email or web address]/diseases/coronavirus/hcp/hospdischarge.pdf    [facility] clinical trials (COVID-19 research studies): [email or web address]-clinical-trials     Below are the COVID-19 hotlines at the [place] Department of [facility] ([facility]). Interpreters are available.   o For health questions: Call 691-319-7820 or 1-585.824.3090 (7 a.m. to 7 p.m.)  o For questions about schools and childcare: Call 016-601-4699 or 1-667.449.8522 (7 a.m. to 7 p.m.)                     
Patient tolerated procedure well.

## 2022-02-22 NOTE — ED ADULT NURSE NOTE - NSFALLRSKOUTCOME_ED_ALL_ED
This encounter is now closed. Dr Igor Shaffer already spoke to patient. See previous telephone encounter. Universal Safety Interventions

## 2022-03-01 ENCOUNTER — APPOINTMENT (OUTPATIENT)
Age: 82
End: 2022-03-01

## 2022-03-01 DIAGNOSIS — R73.03 PREDIABETES.: ICD-10-CM

## 2022-03-13 ENCOUNTER — RESULT CHARGE (OUTPATIENT)
Age: 82
End: 2022-03-13

## 2022-03-15 ENCOUNTER — NON-APPOINTMENT (OUTPATIENT)
Age: 82
End: 2022-03-15

## 2022-03-15 ENCOUNTER — APPOINTMENT (OUTPATIENT)
Dept: INTERNAL MEDICINE | Facility: CLINIC | Age: 82
End: 2022-03-15
Payer: MEDICARE

## 2022-03-15 VITALS
HEIGHT: 58 IN | HEART RATE: 72 BPM | DIASTOLIC BLOOD PRESSURE: 82 MMHG | WEIGHT: 161 LBS | BODY MASS INDEX: 33.8 KG/M2 | TEMPERATURE: 96.81 F | OXYGEN SATURATION: 99 % | RESPIRATION RATE: 16 BRPM | SYSTOLIC BLOOD PRESSURE: 132 MMHG

## 2022-03-15 DIAGNOSIS — R22.1 LOCALIZED SWELLING, MASS AND LUMP, NECK: ICD-10-CM

## 2022-03-15 DIAGNOSIS — M25.562 PAIN IN RIGHT KNEE: ICD-10-CM

## 2022-03-15 DIAGNOSIS — M25.561 PAIN IN RIGHT KNEE: ICD-10-CM

## 2022-03-15 DIAGNOSIS — Z00.00 ENCOUNTER FOR GENERAL ADULT MEDICAL EXAMINATION W/OUT ABNORMAL FINDINGS: ICD-10-CM

## 2022-03-15 DIAGNOSIS — D75.839 THROMBOCYTOSIS, UNSPECIFIED: ICD-10-CM

## 2022-03-15 PROCEDURE — 93000 ELECTROCARDIOGRAM COMPLETE: CPT

## 2022-03-15 PROCEDURE — G0439: CPT

## 2022-03-15 NOTE — HEALTH RISK ASSESSMENT
[Never] : Never [No] : In the past 12 months have you used drugs other than those required for medical reasons? No [1] : 2) Feeling down, depressed, or hopeless for several days (1) [PHQ-2 Positive] : PHQ-2 Positive [I have developed a follow-up plan documented below in the note.] : I have developed a follow-up plan documented below in the note. [Fully functional (bathing, dressing, toileting, transferring, walking, feeding)] : Fully functional (bathing, dressing, toileting, transferring, walking, feeding) [Fully functional (using the telephone, shopping, preparing meals, housekeeping, doing laundry, using] : Fully functional and needs no help or supervision to perform IADLs (using the telephone, shopping, preparing meals, housekeeping, doing laundry, using transportation, managing medications and managing finances) [Audit-CScore] : 0 [DTF0Txcfh] : 2 [MammogramComments] : Rx given for Mammogram and Breast US. [BoneDensityDate] : 05/12 [BoneDensityComments] : Osteopenia.  [ColonoscopyComments] : Previously advised to follow up with Dr. Sands to further discuss repeat screening.

## 2022-03-15 NOTE — ADDENDUM
[FreeTextEntry1] : I, Kevin Castaneda, acted solely as scribe for Dr. Edu Boston DO on this date 03/15/2022  4:30PM .\par \par All medical record entries made by the Scribe were at my, Dr. Edu Boston DO direction and personally dictated by me on 03/15/2022  4:30PM. I have reviewed the chart and agree that the record accurately reflects my personal performance of the history, physical exam, assessment and plan. I have also personally directed, reviewed and agreed with the chart.\par

## 2022-03-15 NOTE — PLAN
[FreeTextEntry1] : Cardiology\par hypertension - check EKG (results as above) - continue Losartan Potassium 50mg BID p.o.q.d. as directed, Hydralazine HCl 50mg TID p.o.q.d. as directed, and Amlodipine Besylate 10mg p.o.q.d. as directed - continue low sodium diet; advised weight loss; will continue to monitor BP\par hyperlipidemia - continue Rosuvastatin Calcium 40mg p.o.q.d. as directed - advised low cholesterol/low fat diet \par ASCAD - continue Rosuvastatin Calcium 40mg p.o.q.d. as directed \par Continue to follow up with cardiologist, Dr. Johnson\par Endocrinology\par diabetes - off Metformin - advised low carbohydrate/low sugar diet\par history of Vitamin D insufficiency - continue Vitamin D-3 25 MCG (1000 IU) p.o.q.d with a meal as directed, Rx filled \par Gynecology\par Previously referred to GYN, Dr. Delcid for annual visit - Rx given for Mammogram and Breast US\par Gastroenterology\par Colonoscopy was initially planned with gastroenterologist, Dr. Sands, but had to be rescheduled secondary to patient's fall and hospitalization; previously advised to follow up with Dr. Sands's office to reschedule the procedure \par GERD - continue Omeprazole 20mg p.o.q.d. as directed - continue antireflux measures\par Nephrology\par renal insufficiency - off Metformin; previously advised against taking any OTC NSAIDs; previously recommended increasing hydration/fluid intake  \par Hematology\par thrombocytosis - continue to follow up with hematologist/oncologist, Dr. Harden\par Integumentary\par right neck mass - referred to dermatologist, Dr. Berg for further evaluation \par Musculoskeletal\par bilateral knee pain - referred to orthopedist, Dr. Becker for further evaluation/treatment \par Neurology\par subarachnoid hemorrhage in right temporal and left frontal lobes - continue to follow up with neurology\par Psychiatry\par depression - continue Sertraline HCl 25mg p.o.q.d. as directed, Rx filled - continue to follow up with psychiatrist, Dr. Thibodeaux\par \par check EKG (results as above)\par Patient to follow up for lab work (female panel, hemoglobin A1C, Urine Albumin/Creatinine Ratio, and UA w/ Reflex Urine Culture).

## 2022-03-15 NOTE — HISTORY OF PRESENT ILLNESS
[FreeTextEntry1] : annual wellness visit [de-identified] : Patient is a 81 year old female with a past medical history as below who presents for an annual wellness visit. Patient states she is taking all medications as prescribed. She denies lightheadedness or dizziness on Losartan Potassium, Hydralazine, and Amlodipine Besylate. GERD has been well-managed on Omeprazole. Colonoscopy was initially planned with gastroenterologist, Dr. Sands, but the procedure had to be rescheduled secondary to patient's fall/hospitalization. Patient is due for annual breast imaging. Patient sees cardiologist, Dr. Johnson given history of HTN, HLD, and ASCAD. BP readings have averaged 140/70 on her home monitor. Patient sees hematologist, Dr. Harden given history of thrombocytosis. Bone marrow biopsy dated 4/9/21 revealed myeloproliferative neoplasm characterized by megakaryocyte hyperplasia with occasional abnormal forms, no significant fibrosis and no increase in blast population as per Dr. aHrden. Patient regularly follows up with psychiatrist, Dr. Thibodeaux given history of depression/anxiety. Patient notes sciatic nerve pain. Patient notes bilateral knee pain, left worse than right. She states the pain is exacerbated with flexion. She notes PSHx of right knee replacement. Patient notes a right neck mass. She states the mass has increased in size, but denies any associated pain. She denies history of gout. Patient continues to note chills. Patient does not receive the flu vaccine annually. She received the Prevnar 13 on 7/31/15. She received the Pneumovax 23 on 8/11/16. She received the Shingles Vaccine several years ago. She has received the booster dose of the COVID-19 Vaccine. Patient requests Rx refill for Sertraline and Vitamin D. Patient is not fasting today.

## 2022-03-15 NOTE — PHYSICAL EXAM
[No Acute Distress] : no acute distress [Well Nourished] : well nourished [Well Developed] : well developed [Well-Appearing] : well-appearing [Normal Sclera/Conjunctiva] : normal sclera/conjunctiva [PERRL] : pupils equal round and reactive to light [EOMI] : extraocular movements intact [Normal Outer Ear/Nose] : the outer ears and nose were normal in appearance [Normal Oropharynx] : the oropharynx was normal [No JVD] : no jugular venous distention [No Lymphadenopathy] : no lymphadenopathy [Supple] : supple [Thyroid Normal, No Nodules] : the thyroid was normal and there were no nodules present [No Respiratory Distress] : no respiratory distress  [No Accessory Muscle Use] : no accessory muscle use [Clear to Auscultation] : lungs were clear to auscultation bilaterally [Normal Rate] : normal rate  [Regular Rhythm] : with a regular rhythm [Normal S1, S2] : normal S1 and S2 [No Murmur] : no murmur heard [No Carotid Bruits] : no carotid bruits [No Abdominal Bruit] : a ~M bruit was not heard ~T in the abdomen [No Varicosities] : no varicosities [Pedal Pulses Present] : the pedal pulses are present [No Edema] : there was no peripheral edema [No Palpable Aorta] : no palpable aorta [No Extremity Clubbing/Cyanosis] : no extremity clubbing/cyanosis [Soft] : abdomen soft [Non Tender] : non-tender [Non-distended] : non-distended [No Masses] : no abdominal mass palpated [No HSM] : no HSM [Normal Bowel Sounds] : normal bowel sounds [Normal Posterior Cervical Nodes] : no posterior cervical lymphadenopathy [Normal Anterior Cervical Nodes] : no anterior cervical lymphadenopathy [No CVA Tenderness] : no CVA  tenderness [No Spinal Tenderness] : no spinal tenderness [No Joint Swelling] : no joint swelling [Grossly Normal Strength/Tone] : grossly normal strength/tone [No Rash] : no rash [Coordination Grossly Intact] : coordination grossly intact [No Focal Deficits] : no focal deficits [Normal Gait] : normal gait [Deep Tendon Reflexes (DTR)] : deep tendon reflexes were 2+ and symmetric [Normal Affect] : the affect was normal [Normal Insight/Judgement] : insight and judgment were intact [de-identified] : obese [de-identified] : right neck oval-shaped 1/2'' by 1/4'' mass; freely mobile, attached to epidermis

## 2022-03-15 NOTE — REVIEW OF SYSTEMS
[Negative] : Heme/Lymph [Joint Pain] : joint pain [Chills] : chills [FreeTextEntry9] : bilateral knee pain [de-identified] : right neck mass [de-identified] : sciatic nerve pain

## 2022-03-15 NOTE — ASSESSMENT
[FreeTextEntry1] : Patient is a 81 year old female with a past medical history as above who presents for an annual wellness visit.\par

## 2022-05-05 ENCOUNTER — NON-APPOINTMENT (OUTPATIENT)
Age: 82
End: 2022-05-05

## 2022-05-05 ENCOUNTER — APPOINTMENT (OUTPATIENT)
Dept: CARDIOLOGY | Facility: CLINIC | Age: 82
End: 2022-05-05
Payer: MEDICARE

## 2022-05-05 VITALS
WEIGHT: 158 LBS | HEIGHT: 58 IN | DIASTOLIC BLOOD PRESSURE: 74 MMHG | BODY MASS INDEX: 33.17 KG/M2 | OXYGEN SATURATION: 96 % | HEART RATE: 62 BPM | SYSTOLIC BLOOD PRESSURE: 175 MMHG

## 2022-05-05 PROCEDURE — 93000 ELECTROCARDIOGRAM COMPLETE: CPT

## 2022-05-05 PROCEDURE — 99214 OFFICE O/P EST MOD 30 MIN: CPT

## 2022-05-05 NOTE — CARDIOLOGY SUMMARY
[de-identified] : SR poor R wave progression.  [de-identified] : nuclear stress test unrevealing for ischemia on 7/2020 [de-identified] : 7/29/20 that showed normal systolic LV function without any significant other findings, including no significant valvular disease. \par 1/6/22 mild to mod AI, severe LA dilatation, normal Lv function, mild diastolic dysfunction.

## 2022-05-05 NOTE — HISTORY OF PRESENT ILLNESS
[FreeTextEntry1] : 81-year-old woman with a history of anxiety, hypertension, hyperlipidemia, coronary calficications. \par \par She had presented to [facility] with a pneumonia. During her hospitalization she was found to have lateral T wave inversions on EKG which were worse than previously seen. She had echocardiogram that showed normal left ventricular function. Her cardiac enzymes were negative. she had a CAT scan of the chest which was also revealing for coronary artery calcifications and calcifications to the aorta and its main branches. she had nuclear stress test which was unremarkable.\par She had a syncopal episode when she was on Chlorthalidone. She was taken to  in early 2019 and her diuretic was stopped.  \par \par She had an echo in 9/2019 that showed normal systolic LV function without any significant other findings, including no significant valvular disease. \par \par she was hospitalized at [facility] on 7/31/21 for  right temporal and left frontal subarachnoid hemorrhage and right wrist Fx after trip and fall outside of [place]. Admitted to ICU 7/31. Repeat CT head done on 8/1/21 reveals stable bleed. \par \par She now presents for a followup visit.\par Since her last visit,  she is feeling well. Her fatigue has improved. She belongs to 2 enrichment clubs and tries to stay active. She is still having wrist weakness. She is still complaining of ENNIS with stairs that is unchanged from previous. She denies any anginal symptoms. She denies any  PND, orthopnea,  edema,  syncope. She denies any blurry vision, headaches or recent stroke like symptoms. she's been compliant with her medications. Though still only taking Hydralazine q12.

## 2022-05-05 NOTE — DISCUSSION/SUMMARY
[FreeTextEntry1] : 81-year-old woman with a history as listed above who presents today for a followup cardiac evaluation.\par \par Yara  is doing well. She denies any anginal symptoms. Clinically she is euvolemic on exam. Her EKG did not reveal any significant ischemic changes. \par \par Her heart rate is much better off of the Coreg. Her BP is elevated today again.  She will continue Losartan 50mg q12, Norvasc 10 mg daily. She will increase her Hydralazine to  50mg q8.   I have encourage her to hydrate more.\par \par She has coronary calcifications. Her goal LDL should be less than 100.  She will continue  Crestor 40mg HS. \par \par She will followup with heme for her thrombocytosis. From a primary prevention standpoint, she does not need ASA given her history of ICH.  \par \par Exercise and diet counseling was performed in order to reduce her future cardiovascular risk. \par She will followup with me in 4-6 months time or sooner if necessary.

## 2022-05-05 NOTE — PHYSICAL EXAM
[Well Groomed] : well groomed [General Appearance - In No Acute Distress] : no acute distress [Normal Conjunctiva] : the conjunctiva exhibited no abnormalities [Eyelids - No Xanthelasma] : the eyelids demonstrated no xanthelasmas [Normal Oral Mucosa] : normal oral mucosa [No Oral Pallor] : no oral pallor [No Oral Cyanosis] : no oral cyanosis [Normal Jugular Venous A Waves Present] : normal jugular venous A waves present [Normal Jugular Venous V Waves Present] : normal jugular venous V waves present [No Jugular Venous Costello A Waves] : no jugular venous costelol A waves [Respiration, Rhythm And Depth] : normal respiratory rhythm and effort [Exaggerated Use Of Accessory Muscles For Inspiration] : no accessory muscle use [Auscultation Breath Sounds / Voice Sounds] : lungs were clear to auscultation bilaterally [Abdomen Soft] : soft [Abdomen Tenderness] : non-tender [Abdomen Mass (___ Cm)] : no abdominal mass palpated [Abnormal Walk] : normal gait [Gait - Sufficient For Exercise Testing] : the gait was sufficient for exercise testing [FreeTextEntry1] : + tenderness in left flank [Nail Clubbing] : no clubbing of the fingernails [Cyanosis, Localized] : no localized cyanosis [Petechial Hemorrhages (___cm)] : no petechial hemorrhages [Skin Color & Pigmentation] : normal skin color and pigmentation [] : no rash [No Venous Stasis] : no venous stasis [Skin Lesions] : no skin lesions [No Skin Ulcers] : no skin ulcer [No Xanthoma] : no  xanthoma was observed [Oriented To Time, Place, And Person] : oriented to person, place, and time [Affect] : the affect was normal [Mood] : the mood was normal [No Anxiety] : not feeling anxious [Normal Rate] : normal [Rhythm Regular] : regular [Normal S1] : normal S1 [Normal S2] : normal S2 [No Gallop] : no gallop heard [II] : a grade 2 [I] : a grade 1 [1+] : left 1+ [Right Carotid Bruit] : no bruit heard over the right carotid [Left Carotid Bruit] : no bruit heard over the left carotid [2+] : left 2+ [Bruit] : no bruit heard [No Pitting Edema] : no pitting edema present

## 2022-05-17 ENCOUNTER — APPOINTMENT (OUTPATIENT)
Dept: INTERNAL MEDICINE | Facility: CLINIC | Age: 82
End: 2022-05-17
Payer: MEDICARE

## 2022-05-17 VITALS
SYSTOLIC BLOOD PRESSURE: 130 MMHG | DIASTOLIC BLOOD PRESSURE: 72 MMHG | WEIGHT: 160 LBS | HEART RATE: 61 BPM | OXYGEN SATURATION: 99 % | TEMPERATURE: 98.7 F | RESPIRATION RATE: 16 BRPM | BODY MASS INDEX: 33.58 KG/M2 | HEIGHT: 58 IN

## 2022-05-17 DIAGNOSIS — Z12.11 ENCOUNTER FOR SCREENING FOR MALIGNANT NEOPLASM OF COLON: ICD-10-CM

## 2022-05-17 DIAGNOSIS — Z13.820 ENCOUNTER FOR SCREENING FOR OSTEOPOROSIS: ICD-10-CM

## 2022-05-17 DIAGNOSIS — R42 DIZZINESS AND GIDDINESS: ICD-10-CM

## 2022-05-17 DIAGNOSIS — B02.9 ZOSTER W/OUT COMPLICATIONS: ICD-10-CM

## 2022-05-17 PROCEDURE — 99214 OFFICE O/P EST MOD 30 MIN: CPT

## 2022-05-17 RX ORDER — VALACYCLOVIR 1 G/1
1 TABLET, FILM COATED ORAL
Qty: 21 | Refills: 0 | Status: ACTIVE | COMMUNITY
Start: 2022-05-17 | End: 1900-01-01

## 2022-05-17 NOTE — PLAN
[FreeTextEntry1] : Cardiology\par hypertension - continue Losartan Potassium 50mg BID p.o.q.d. as directed, Hydralazine HCl 50mg TID p.o.q.d. as directed, and Amlodipine Besylate 10mg p.o.q.d. as directed - continue low sodium diet; advised weight loss; will continue to monitor BP\par hyperlipidemia - continue Rosuvastatin Calcium 40mg p.o.q.d. as directed - advised low cholesterol/low fat diet \par ASCAD - continue Rosuvastatin Calcium 40mg p.o.q.d. as directed \par Continue to follow up with cardiologist, Dr. Johnson\par Endocrinology\par diabetes - off Metformin - advised low carbohydrate/low sugar diet\par history of Vitamin D insufficiency - continue Vitamin D-3 25 MCG (1000 IU) p.o.q.d with a meal as directed \par Endocrinology/Musculoskeletal\par Rx given for DEXA Scan\par Gynecology\par Previously referred to GYN, Dr. Delcid for annual visit; Rx previously given for Mammogram and Breast US\par Gastroenterology\par Colonoscopy was initially planned with gastroenterologist, Dr. Sands, but had to be rescheduled secondary to patient's fall and hospitalization; previously advised to follow up with Dr. Sands's office to reschedule the procedure; discussed FIT-DNA testing: kit given  \par GERD - continue Omeprazole 20mg p.o.q.d. as directed - continue antireflux measures\par Nephrology\par renal insufficiency - off Metformin; previously advised against taking any OTC NSAIDs; previously recommended increasing hydration/fluid intake - will continue to monitor renal function  \par Hematology\par thrombocytosis - follow up with hematologist/oncologist, Dr. Harden\par Integumentary\par right neck mass - previously referred to dermatologist, Dr. Berg for further evaluation \par Musculoskeletal\par bilateral knee pain - previously referred to orthopedist, Dr. Becker for further evaluation/treatment \par Neurology\par subarachnoid hemorrhage in right temporal and left frontal lobes - continue to follow up with neurology\par Psychiatry\par depression - continue Sertraline HCl 25mg p.o.q.d. as directed - continue to follow up with psychiatrist, Dr. Thibodeaux\par Infectious Disease\par Shingles (left C2 distribution) - start Valacyclovir 1 GM TID p.o.q.d. for 1 week as directed, Rx filled \par \par Patient to follow up for lab work (female panel, hemoglobin A1C, Urine Albumin/Creatinine Ratio, and UA w/ Reflex Urine Culture) in 2 months.

## 2022-05-17 NOTE — ADDENDUM
[FreeTextEntry1] : I, Kevin Castaneda, acted solely as scribe for Dr. Edu Boston DO on this date 05/17/2022  4:40PM .\par \par All medical record entries made by the Scribe were at my, Dr. Edu Boston DO direction and personally dictated by me on 05/17/2022  4:40PM. I have reviewed the chart and agree that the record accurately reflects my personal performance of the history, physical exam, assessment and plan. I have also personally directed, reviewed and agreed with the chart.

## 2022-05-17 NOTE — REVIEW OF SYSTEMS
[Chills] : chills [Dizziness] : dizziness [Negative] : Heme/Lymph [de-identified] : left forehead/scalp skin lesions

## 2022-05-17 NOTE — HISTORY OF PRESENT ILLNESS
[FreeTextEntry1] : general follow-up [de-identified] : Patient is a 81 year old female with a past medical history as below who presents for general follow-up. Patient states she is taking all medications as prescribed. GERD has been well-managed on Omeprazole. Patient has noted several episodes of lightheadedness over the course of the past week. She is unsure if the dizzy spells occurred with changes in position. She denies fever, SOB/ENNIS, sore throat, earache, or abdominal pain. Patient continues to note chills. Patient notes left forehead/scalp skin lesions; first observed approximately 10 days ago. She had previously noted associated pain. Patient denies following up with dermatologist, Dr. Berg or orthopedist, Dr. Becker since her last visit. Patient is due for bone density testing.

## 2022-05-17 NOTE — HEALTH RISK ASSESSMENT
[Never] : Never [No] : In the past 12 months have you used drugs other than those required for medical reasons? No [1] : 2) Feeling down, depressed, or hopeless for several days (1) [PHQ-2 Positive] : PHQ-2 Positive [I have developed a follow-up plan documented below in the note.] : I have developed a follow-up plan documented below in the note. [Fully functional (bathing, dressing, toileting, transferring, walking, feeding)] : Fully functional (bathing, dressing, toileting, transferring, walking, feeding) [Fully functional (using the telephone, shopping, preparing meals, housekeeping, doing laundry, using] : Fully functional and needs no help or supervision to perform IADLs (using the telephone, shopping, preparing meals, housekeeping, doing laundry, using transportation, managing medications and managing finances) [Audit-CScore] : 0 [[id number]] : 2 [MammogramComments] : Rx previously given for Mammogram and Breast US. [BoneDensityDate] : 05/12 [BoneDensityComments] : Osteopenia; Rx given for repeat DEXA Scan.  [ColonoscopyComments] : Previously advised to follow up with Dr. Sands to further discuss repeat screening; discussed FIT-DNA testing: kit given.

## 2022-05-17 NOTE — PHYSICAL EXAM
[No Acute Distress] : no acute distress [Well Nourished] : well nourished [Well Developed] : well developed [Well-Appearing] : well-appearing [Normal Sclera/Conjunctiva] : normal sclera/conjunctiva [PERRL] : pupils equal round and reactive to light [EOMI] : extraocular movements intact [Normal Outer Ear/Nose] : the outer ears and nose were normal in appearance [Normal Oropharynx] : the oropharynx was normal [No JVD] : no jugular venous distention [No Lymphadenopathy] : no lymphadenopathy [Supple] : supple [Thyroid Normal, No Nodules] : the thyroid was normal and there were no nodules present [No Respiratory Distress] : no respiratory distress  [No Accessory Muscle Use] : no accessory muscle use [Clear to Auscultation] : lungs were clear to auscultation bilaterally [Normal Rate] : normal rate  [Regular Rhythm] : with a regular rhythm [Normal S1, S2] : normal S1 and S2 [No Murmur] : no murmur heard [No Carotid Bruits] : no carotid bruits [No Abdominal Bruit] : a ~M bruit was not heard ~T in the abdomen [No Varicosities] : no varicosities [Pedal Pulses Present] : the pedal pulses are present [No Edema] : there was no peripheral edema [No Palpable Aorta] : no palpable aorta [No Extremity Clubbing/Cyanosis] : no extremity clubbing/cyanosis [Soft] : abdomen soft [Non Tender] : non-tender [Non-distended] : non-distended [No Masses] : no abdominal mass palpated [No HSM] : no HSM [Normal Bowel Sounds] : normal bowel sounds [Normal Posterior Cervical Nodes] : no posterior cervical lymphadenopathy [Normal Anterior Cervical Nodes] : no anterior cervical lymphadenopathy [No CVA Tenderness] : no CVA  tenderness [No Spinal Tenderness] : no spinal tenderness [No Joint Swelling] : no joint swelling [Grossly Normal Strength/Tone] : grossly normal strength/tone [Coordination Grossly Intact] : coordination grossly intact [No Focal Deficits] : no focal deficits [Normal Gait] : normal gait [Deep Tendon Reflexes (DTR)] : deep tendon reflexes were 2+ and symmetric [Normal Affect] : the affect was normal [Normal Insight/Judgement] : insight and judgment were intact [de-identified] : obese [de-identified] : papule of left forehead with scale

## 2022-05-17 NOTE — ASSESSMENT
[FreeTextEntry1] : Patient is a 81 year old female with a past medical history as above who presents for general follow-up.

## 2022-08-03 NOTE — H&P ADULT - EXTREMITIES COMMENTS
Rt wrist sling Azithromycin Counseling:  I discussed with the patient the risks of azithromycin including but not limited to GI upset, allergic reaction, drug rash, diarrhea, and yeast infections.

## 2022-08-05 ENCOUNTER — APPOINTMENT (OUTPATIENT)
Dept: ORTHOPEDIC SURGERY | Facility: CLINIC | Age: 82
End: 2022-08-05

## 2022-08-05 VITALS — BODY MASS INDEX: 29.44 KG/M2 | WEIGHT: 160 LBS | HEIGHT: 62 IN

## 2022-08-05 PROCEDURE — 73562 X-RAY EXAM OF KNEE 3: CPT | Mod: RT

## 2022-08-05 PROCEDURE — 99204 OFFICE O/P NEW MOD 45 MIN: CPT

## 2022-09-02 ENCOUNTER — APPOINTMENT (OUTPATIENT)
Dept: ORTHOPEDIC SURGERY | Facility: CLINIC | Age: 82
End: 2022-09-02

## 2022-09-02 DIAGNOSIS — M17.12 UNILATERAL PRIMARY OSTEOARTHRITIS, LEFT KNEE: ICD-10-CM

## 2022-09-02 DIAGNOSIS — Z96.651 PRESENCE OF RIGHT ARTIFICIAL KNEE JOINT: ICD-10-CM

## 2022-09-02 PROCEDURE — 20610 DRAIN/INJ JOINT/BURSA W/O US: CPT | Mod: LT

## 2022-09-02 PROCEDURE — 99214 OFFICE O/P EST MOD 30 MIN: CPT | Mod: 25

## 2022-09-02 PROCEDURE — 73562 X-RAY EXAM OF KNEE 3: CPT | Mod: RT

## 2022-09-08 ENCOUNTER — APPOINTMENT (OUTPATIENT)
Dept: CARDIOLOGY | Facility: CLINIC | Age: 82
End: 2022-09-08

## 2022-09-08 PROBLEM — M17.12 PRIMARY LOCALIZED OSTEOARTHRITIS OF LEFT KNEE: Status: ACTIVE | Noted: 2022-08-08

## 2022-09-08 PROBLEM — Z96.651 STATUS POST RIGHT KNEE REPLACEMENT: Status: ACTIVE | Noted: 2022-08-08

## 2022-10-14 ENCOUNTER — LABORATORY RESULT (OUTPATIENT)
Age: 82
End: 2022-10-14

## 2022-10-14 ENCOUNTER — APPOINTMENT (OUTPATIENT)
Dept: INTERNAL MEDICINE | Facility: CLINIC | Age: 82
End: 2022-10-14

## 2022-10-14 VITALS
SYSTOLIC BLOOD PRESSURE: 132 MMHG | WEIGHT: 156 LBS | RESPIRATION RATE: 17 BRPM | DIASTOLIC BLOOD PRESSURE: 72 MMHG | OXYGEN SATURATION: 96 % | TEMPERATURE: 98.5 F | HEIGHT: 62 IN | BODY MASS INDEX: 28.71 KG/M2 | HEART RATE: 90 BPM

## 2022-10-14 DIAGNOSIS — E55.9 VITAMIN D DEFICIENCY, UNSPECIFIED: ICD-10-CM

## 2022-10-14 DIAGNOSIS — R05.9 COUGH, UNSPECIFIED: ICD-10-CM

## 2022-10-14 DIAGNOSIS — R49.0 DYSPHONIA: ICD-10-CM

## 2022-10-14 DIAGNOSIS — K21.9 GASTRO-ESOPHAGEAL REFLUX DISEASE W/OUT ESOPHAGITIS: ICD-10-CM

## 2022-10-14 DIAGNOSIS — N28.9 DISORDER OF KIDNEY AND URETER, UNSPECIFIED: ICD-10-CM

## 2022-10-14 DIAGNOSIS — E11.9 TYPE 2 DIABETES MELLITUS W/OUT COMPLICATIONS: ICD-10-CM

## 2022-10-14 PROCEDURE — 36415 COLL VENOUS BLD VENIPUNCTURE: CPT

## 2022-10-14 PROCEDURE — 99214 OFFICE O/P EST MOD 30 MIN: CPT | Mod: 25

## 2022-10-15 PROBLEM — E55.9 VITAMIN D INSUFFICIENCY: Status: ACTIVE | Noted: 2022-03-15

## 2022-10-15 PROBLEM — R05.9 COUGH: Status: ACTIVE | Noted: 2019-12-04

## 2022-10-15 PROBLEM — K21.9 CHRONIC GERD: Status: ACTIVE | Noted: 2020-02-12

## 2022-10-15 PROBLEM — N28.9 RENAL INSUFFICIENCY: Status: ACTIVE | Noted: 2021-10-06

## 2022-10-15 PROBLEM — E11.9 TYPE 2 DIABETES MELLITUS: Status: ACTIVE | Noted: 2021-08-12

## 2022-10-15 PROBLEM — R49.0 HOARSENESS: Status: ACTIVE | Noted: 2020-02-12

## 2022-10-15 NOTE — REVIEW OF SYSTEMS
[Fatigue] : fatigue [Joint Pain] : joint pain [Negative] : Heme/Lymph [FreeTextEntry4] : loss of voice [FreeTextEntry9] : bilateral knee pain [de-identified] : right neck mass

## 2022-10-15 NOTE — ASSESSMENT
[FreeTextEntry1] : Patient is a 81 year old female with a past medical history as above who presents for fasting blood work, Rx refill, and general follow-up.

## 2022-10-15 NOTE — PLAN
[FreeTextEntry1] : Constitutional\par fatigue - check CBC, CMP, and TSH - discussed gradually increasing CV exercise pending results of blood work as fatigue may be secondary to deconditioning \par ENT\par right neck mass - referred to ENT specialist, Dr. Gallegos for further evaluation\par loss of voice/post-nasal drip - possibly secondary to viral URI - recommended non-sedating antihistamine (OTC Claritin, Allegra, or Zyrtec) - referred to ENT specialist, Dr. Gallegos for further evaluation\par Cardiology\par hypertension - continue Losartan Potassium 50mg BID p.o.q.d. as directed, Hydralazine HCl 50mg TID p.o.q.d. as directed, and Amlodipine Besylate 10mg p.o.q.d. as directed - check CMP - continue low sodium diet and weight loss; will continue to monitor BP\par hyperlipidemia - continue Rosuvastatin Calcium 40mg p.o.q.d. as directed - continue low cholesterol/low fat diet - check FLP and LFTs\par ASCAD - continue Rosuvastatin Calcium 40mg p.o.q.d. as directed \par Continue to follow up with cardiologist, Dr. Johnson\par Endocrinology\par diabetes - off Metformin - continue low carbohydrate/low sugar diet - check hemoglobin A1C and Urine Albumin to Creatinine Ratio\par history of Vitamin D insufficiency - continue Vitamin D-3 25 MCG (1000 IU) p.o.q.d with a meal as directed - check Vitamin D \par Endocrinology/Musculoskeletal\par Rx previously given for DEXA Scan\par Gynecology\par Rx previously given for Mammogram/Breast US\par Gastroenterology\par Colonoscopy was initially planned with gastroenterologist, Dr. Sands, but had to be rescheduled secondary to patient's fall and hospitalization; previously advised to follow up with Dr. Sands's office to reschedule the procedure; previously discussed FIT Test: kit was given  \par GERD - continue Omeprazole 20mg p.o.q.d. as directed, Rx filled - continue antireflux measures\par Nephrology\par renal insufficiency - check CMP - off Metformin; previously advised against taking any OTC NSAIDs; previously recommended increasing hydration/fluid intake \par Hematology\par thrombocytosis - check PLT - follow up with hematologist/oncologist, Dr. Harden\par Integumentary\par right neck mass - previously referred to dermatologist, Dr. Berg for further evaluation \par Musculoskeletal\par right knee pain - s/p right TKR - continue conservative treatment as recommended by orthopedist, Dr. Joshi \par left knee pain - s/p cortisone injection - follow up with Dr. Joshi as necessary\par Neurology\par subarachnoid hemorrhage in right temporal and left frontal lobes - continue to follow up with neurology\par Psychiatry\par depression - continue Sertraline HCl 25mg p.o.q.d. as directed - continue to follow up with psychiatrist, Dr. Thibodeaux; results of blood work to be faxed over to Dr. Thibodeaux's office\par Immunization\par flu vaccine - discussed, declined\par S/p COVID-19 Vaccine - recommended following up at pharmacy for updated COVID-19 vaccine booster\par \par check female panel, hemoglobin A1C, Urine Albumin/Creatinine Ratio, and UA w/ Reflex Urine Culture

## 2022-10-15 NOTE — PHYSICAL EXAM
[No Acute Distress] : no acute distress [Well Nourished] : well nourished [Well Developed] : well developed [Well-Appearing] : well-appearing [Normal Sclera/Conjunctiva] : normal sclera/conjunctiva [PERRL] : pupils equal round and reactive to light [EOMI] : extraocular movements intact [Normal Outer Ear/Nose] : the outer ears and nose were normal in appearance [No JVD] : no jugular venous distention [No Lymphadenopathy] : no lymphadenopathy [Supple] : supple [Thyroid Normal, No Nodules] : the thyroid was normal and there were no nodules present [No Respiratory Distress] : no respiratory distress  [No Accessory Muscle Use] : no accessory muscle use [Clear to Auscultation] : lungs were clear to auscultation bilaterally [Normal Rate] : normal rate  [Regular Rhythm] : with a regular rhythm [Normal S1, S2] : normal S1 and S2 [No Murmur] : no murmur heard [No Carotid Bruits] : no carotid bruits [No Abdominal Bruit] : a ~M bruit was not heard ~T in the abdomen [No Varicosities] : no varicosities [Pedal Pulses Present] : the pedal pulses are present [No Edema] : there was no peripheral edema [No Palpable Aorta] : no palpable aorta [No Extremity Clubbing/Cyanosis] : no extremity clubbing/cyanosis [Soft] : abdomen soft [Non Tender] : non-tender [Non-distended] : non-distended [No Masses] : no abdominal mass palpated [No HSM] : no HSM [Normal Bowel Sounds] : normal bowel sounds [Normal Posterior Cervical Nodes] : no posterior cervical lymphadenopathy [Normal Anterior Cervical Nodes] : no anterior cervical lymphadenopathy [No CVA Tenderness] : no CVA  tenderness [No Spinal Tenderness] : no spinal tenderness [No Joint Swelling] : no joint swelling [Grossly Normal Strength/Tone] : grossly normal strength/tone [No Rash] : no rash [Coordination Grossly Intact] : coordination grossly intact [No Focal Deficits] : no focal deficits [Normal Gait] : normal gait [Deep Tendon Reflexes (DTR)] : deep tendon reflexes were 2+ and symmetric [Normal Affect] : the affect was normal [Normal Insight/Judgement] : insight and judgment were intact [Normal Voice/Communication] : normal voice/communication [Normal TMs] : both tympanic membranes were normal [Normal Nasal Mucosa] : the nasal mucosa was normal [Normal Supraclavicular Nodes] : no supraclavicular lymphadenopathy [Kyphosis] : no kyphosis [Speech Grossly Normal] : speech grossly normal [Scoliosis] : no scoliosis [Memory Grossly Normal] : memory grossly normal [Alert and Oriented x3] : oriented to person, place, and time [Normal Mood] : the mood was normal [de-identified] : post-nasal drip  [de-identified] : oval, rigid, but freely mobile superficial mass in the subcutaneous tissue of right neck [de-identified] : overweight  [de-identified] : oval, rigid, but freely mobile superficial mass in the subcutaneous tissue of right neck

## 2022-10-15 NOTE — ADDENDUM
[FreeTextEntry1] : I, Kevin Castaneda, acted solely as scribe for Dr. Edu Boston DO on this date 10/14/2022  3:50PM .\par \par All medical record entries made by the Scribe were at my, Dr. Edu Boston DO direction and personally dictated by me on 10/14/2022  3:50PM. I have reviewed the chart and agree that the record accurately reflects my personal performance of the history, physical exam, assessment and plan. I have also personally directed, reviewed and agreed with the chart.

## 2022-10-15 NOTE — HISTORY OF PRESENT ILLNESS
[FreeTextEntry1] : fasting blood work, Rx refill, and general follow-up [de-identified] : Patient is a 81 year old female with a past medical history as below who presents for fasting blood work, Rx refill, and general follow-up. \par \par Patient states she is taking all medications as prescribed. She denies any recent episodes of lightheadedness/dizziness on Losartan Potassium, Hydralazine, and Amlodipine Besylate. GERD has been well-managed on Omeprazole. \par \par Patient denies having FIT Test or DEXA Scan done since last visit.\par \par Since last visit, patient notes seeing orthopedist Dr. Joshi regarding bilateral knee pain. Conservative therapy was recommended for right knee pain (s/p TKR); also s/p cortisone injection into left knee joint.\par \par Patient notes right neck mass which may have increased in size. She denies any associated pain.\par \par Patient does not receive the flu vaccine annually. She is vaccinated against COVID-19.\par \par Patient notes recent history of viral URI. She had been taking OTC Robitussin for the cough; improving. COVID-19 Test was reportedly negative. \par Patient notes loss of voice. \par \par Patient notes fatigue. \par Patient notes a poor diet and has not been exercising regularly.\par \par Patient requests Rx refill for Omeprazole.

## 2022-10-15 NOTE — HEALTH RISK ASSESSMENT
[Never] : Never [No] : In the past 12 months have you used drugs other than those required for medical reasons? No [1] : 2) Feeling down, depressed, or hopeless for several days (1) [PHQ-2 Positive] : PHQ-2 Positive [I have developed a follow-up plan documented below in the note.] : I have developed a follow-up plan documented below in the note. [Fully functional (bathing, dressing, toileting, transferring, walking, feeding)] : Fully functional (bathing, dressing, toileting, transferring, walking, feeding) [Fully functional (using the telephone, shopping, preparing meals, housekeeping, doing laundry, using] : Fully functional and needs no help or supervision to perform IADLs (using the telephone, shopping, preparing meals, housekeeping, doing laundry, using transportation, managing medications and managing finances) [Audit-CScore] : 0 [[id number]] : 2 [BoneDensityDate] : 05/12 [MammogramComments] : Rx previously given for Mammogram and Breast US. [BoneDensityComments] : Osteopenia; Rx previously given for repeat DEXA Scan.  [ColonoscopyComments] : Previously advised to follow up with Dr. Sands to further discuss repeat screening; previously discussed FIT testing: kit was given.

## 2022-11-07 LAB
25(OH)D3 SERPL-MCNC: 75.1 NG/ML
ALBUMIN SERPL ELPH-MCNC: 4.3 G/DL
ALP BLD-CCNC: 115 U/L
ALT SERPL-CCNC: 14 U/L
ANION GAP SERPL CALC-SCNC: 14 MMOL/L
APPEARANCE: ABNORMAL
AST SERPL-CCNC: 23 U/L
BASOPHILS # BLD AUTO: 0.09 K/UL
BASOPHILS NFR BLD AUTO: 1 %
BILIRUB SERPL-MCNC: 0.5 MG/DL
BILIRUBIN URINE: NEGATIVE
BLOOD URINE: NEGATIVE
BUN SERPL-MCNC: 24 MG/DL
CALCIUM SERPL-MCNC: 9.9 MG/DL
CHLORIDE SERPL-SCNC: 100 MMOL/L
CHOLEST SERPL-MCNC: 145 MG/DL
CO2 SERPL-SCNC: 24 MMOL/L
COLOR: YELLOW
CREAT SERPL-MCNC: 1.33 MG/DL
CREAT SPEC-SCNC: 144 MG/DL
EGFR: 40 ML/MIN/1.73M2
EOSINOPHIL # BLD AUTO: 0.24 K/UL
EOSINOPHIL NFR BLD AUTO: 2.6 %
ESTIMATED AVERAGE GLUCOSE: 131 MG/DL
GLUCOSE QUALITATIVE U: NEGATIVE
GLUCOSE SERPL-MCNC: 115 MG/DL
HBA1C MFR BLD HPLC: 6.2 %
HCT VFR BLD CALC: 41.5 %
HDLC SERPL-MCNC: 48 MG/DL
HGB BLD-MCNC: 12.9 G/DL
IMM GRANULOCYTES NFR BLD AUTO: 0.5 %
KETONES URINE: NEGATIVE
LDLC SERPL CALC-MCNC: 72 MG/DL
LEUKOCYTE ESTERASE URINE: ABNORMAL
LYMPHOCYTES # BLD AUTO: 2.36 K/UL
LYMPHOCYTES NFR BLD AUTO: 25.1 %
MAN DIFF?: NORMAL
MCHC RBC-ENTMCNC: 29.1 PG
MCHC RBC-ENTMCNC: 31.1 GM/DL
MCV RBC AUTO: 93.7 FL
MICROALBUMIN 24H UR DL<=1MG/L-MCNC: 3.7 MG/DL
MICROALBUMIN/CREAT 24H UR-RTO: 26 MG/G
MONOCYTES # BLD AUTO: 0.93 K/UL
MONOCYTES NFR BLD AUTO: 9.9 %
NEUTROPHILS # BLD AUTO: 5.72 K/UL
NEUTROPHILS NFR BLD AUTO: 60.9 %
NITRITE URINE: POSITIVE
NONHDLC SERPL-MCNC: 97 MG/DL
PH URINE: 5.5
PLATELET # BLD AUTO: 490 K/UL
POTASSIUM SERPL-SCNC: 4.5 MMOL/L
PROT SERPL-MCNC: 7.3 G/DL
PROTEIN URINE: ABNORMAL
RBC # BLD: 4.43 M/UL
RBC # FLD: 14.6 %
SODIUM SERPL-SCNC: 137 MMOL/L
SPECIFIC GRAVITY URINE: 1.02
TRIGL SERPL-MCNC: 123 MG/DL
TSH SERPL-ACNC: 1.27 UIU/ML
UROBILINOGEN URINE: NORMAL
WBC # FLD AUTO: 9.39 K/UL

## 2022-11-07 RX ORDER — METFORMIN HYDROCHLORIDE 1000 MG/1
1000 TABLET, COATED ORAL
Refills: 0 | Status: DISCONTINUED | COMMUNITY
End: 2022-11-07

## 2022-11-22 ENCOUNTER — NON-APPOINTMENT (OUTPATIENT)
Age: 82
End: 2022-11-22

## 2023-04-03 ENCOUNTER — APPOINTMENT (OUTPATIENT)
Dept: INTERNAL MEDICINE | Facility: CLINIC | Age: 83
End: 2023-04-03
Payer: MEDICARE

## 2023-04-03 VITALS — SYSTOLIC BLOOD PRESSURE: 174 MMHG | DIASTOLIC BLOOD PRESSURE: 60 MMHG

## 2023-04-03 VITALS
TEMPERATURE: 98.9 F | BODY MASS INDEX: 28.71 KG/M2 | HEART RATE: 76 BPM | SYSTOLIC BLOOD PRESSURE: 186 MMHG | WEIGHT: 156 LBS | OXYGEN SATURATION: 97 % | HEIGHT: 62 IN | DIASTOLIC BLOOD PRESSURE: 72 MMHG | RESPIRATION RATE: 17 BRPM

## 2023-04-03 DIAGNOSIS — F41.9 ANXIETY DISORDER, UNSPECIFIED: ICD-10-CM

## 2023-04-03 DIAGNOSIS — Z79.899 OTHER LONG TERM (CURRENT) DRUG THERAPY: ICD-10-CM

## 2023-04-03 DIAGNOSIS — S62.109A FRACTURE OF UNSPECIFIED CARPAL BONE, UNSPECIFIED WRIST, INITIAL ENCOUNTER FOR CLOSED FRACTURE: ICD-10-CM

## 2023-04-03 DIAGNOSIS — M17.11 UNILATERAL PRIMARY OSTEOARTHRITIS, RIGHT KNEE: ICD-10-CM

## 2023-04-03 DIAGNOSIS — Z86.39 PERSONAL HISTORY OF OTHER ENDOCRINE, NUTRITIONAL AND METABOLIC DISEASE: ICD-10-CM

## 2023-04-03 DIAGNOSIS — R68.83 CHILLS (WITHOUT FEVER): ICD-10-CM

## 2023-04-03 DIAGNOSIS — R22.1 LOCALIZED SWELLING, MASS AND LUMP, NECK: ICD-10-CM

## 2023-04-03 DIAGNOSIS — R73.9 HYPERGLYCEMIA, UNSPECIFIED: ICD-10-CM

## 2023-04-03 PROCEDURE — 36415 COLL VENOUS BLD VENIPUNCTURE: CPT

## 2023-04-03 PROCEDURE — 99214 OFFICE O/P EST MOD 30 MIN: CPT | Mod: 25

## 2023-04-03 NOTE — END OF VISIT
[FreeTextEntry3] : This note was written by Araseli Moulton on 04/03/2023, acting as a scribe for Dr. Edu Boston DO.\par \par All medic record entries were at my, Dr. Edu Boston DO , direction and personally dictated by me in 04/03/2023. I have personally reviewed the chart and agree that the record accurately reflects my personal performance of the history, physical exam, assessment, and plan.\par

## 2023-04-03 NOTE — REVIEW OF SYSTEMS
[Negative] : Heme/Lymph [Chills] : chills [Fatigue] : fatigue [FreeTextEntry9] : left knee pain, right wrist pain

## 2023-04-03 NOTE — PHYSICAL EXAM
[No Acute Distress] : no acute distress [Well Nourished] : well nourished [Well Developed] : well developed [Well-Appearing] : well-appearing [Normal Sclera/Conjunctiva] : normal sclera/conjunctiva [PERRL] : pupils equal round and reactive to light [EOMI] : extraocular movements intact [Normal Outer Ear/Nose] : the outer ears and nose were normal in appearance [Normal Oropharynx] : the oropharynx was normal [No JVD] : no jugular venous distention [No Lymphadenopathy] : no lymphadenopathy [Supple] : supple [No Respiratory Distress] : no respiratory distress  [No Accessory Muscle Use] : no accessory muscle use [Clear to Auscultation] : lungs were clear to auscultation bilaterally [Normal Rate] : normal rate  [Regular Rhythm] : with a regular rhythm [Normal S1, S2] : normal S1 and S2 [No Murmur] : no murmur heard [No Carotid Bruits] : no carotid bruits [No Abdominal Bruit] : a ~M bruit was not heard ~T in the abdomen [Pedal Pulses Present] : the pedal pulses are present [No Edema] : there was no peripheral edema [No Palpable Aorta] : no palpable aorta [No Extremity Clubbing/Cyanosis] : no extremity clubbing/cyanosis [Soft] : abdomen soft [Non Tender] : non-tender [Non-distended] : non-distended [No Masses] : no abdominal mass palpated [No HSM] : no HSM [Normal Bowel Sounds] : normal bowel sounds [Normal Posterior Cervical Nodes] : no posterior cervical lymphadenopathy [Normal Anterior Cervical Nodes] : no anterior cervical lymphadenopathy [No CVA Tenderness] : no CVA  tenderness [No Spinal Tenderness] : no spinal tenderness [No Joint Swelling] : no joint swelling [Grossly Normal Strength/Tone] : grossly normal strength/tone [No Rash] : no rash [Coordination Grossly Intact] : coordination grossly intact [No Focal Deficits] : no focal deficits [Normal Gait] : normal gait [Deep Tendon Reflexes (DTR)] : deep tendon reflexes were 2+ and symmetric [Normal Affect] : the affect was normal [Normal Insight/Judgement] : insight and judgment were intact [Normal Voice/Communication] : normal voice/communication [Normal TMs] : both tympanic membranes were normal [Normal Supraclavicular Nodes] : no supraclavicular lymphadenopathy [Kyphosis] : kyphosis [Acne] : no acne [Speech Grossly Normal] : speech grossly normal [Memory Grossly Normal] : memory grossly normal [Alert and Oriented x3] : oriented to person, place, and time [Normal Mood] : the mood was normal [de-identified] : overweight

## 2023-04-03 NOTE — ASSESSMENT
[FreeTextEntry1] : Patient is a 81 year old female with a past medical history as above who presents for fasting blood work and general follow-up.

## 2023-04-03 NOTE — PLAN
[FreeTextEntry1] : Constitutional\par fatigue - check CBC, CMP, and TSH \par ENT\par right neck mass - referred to ENT specialist, Dr. Gallegos for further evaluation\par Cardiology\par hypertension - continue Losartan Potassium 50mg BID p.o.q.d. as directed, Hydralazine HCl 50mg TID p.o.q.d. as directed, and Amlodipine Besylate 10mg p.o.q.d. as directed - check CMP - continue low sodium diet and weight loss; continue to monitor bp at [facility]\par hyperlipidemia - continue Rosuvastatin Calcium 40mg p.o.q.d. as directed - continue low cholesterol/low fat diet - check FLP and LFTs\par ASCAD - continue Rosuvastatin Calcium 40mg p.o.q.d. as directed \par Continue to follow up with cardiologist, Dr. Johnson\par Endocrinology\par diabetes - off Metformin - continue low carbohydrate/low sugar diet - check hemoglobin A1C and Urine Albumin to Creatinine Ratio\par history of Vitamin D insufficiency - continue Vitamin D-3 25 MCG (1000 IU) p.o.q.d with a meal as directed - check Vitamin D \par Gastroenterology\par previously advised to follow up with Dr. Sands's office to reschedule colonoscopy; previously discussed FIT Test: kit was given  \par GERD - continue Omeprazole 20mg p.o.q.d. as directed - continue antireflux measures\par Nephrology\par renal insufficiency - check CMP - off Metformin; previously advised against taking any OTC NSAIDs; previously recommended increasing hydration/fluid intake \par Hematology\par thrombocytosis - follow up with hematologist/oncologist, Dr. Harden\par Integumentary\par right neck mass - previously referred to dermatologist, Dr. Berg for further evaluation \par Musculoskeletal\par right knee pain - s/p right TKR - continue conservative treatment as recommended by orthopedist, Dr. Joshi \par left knee pain - s/p cortisone injection - follow up with Dr. Joshi as necessary\par [facility] referral line to find an orthopedist near her with availability \par Neurology\par subarachnoid hemorrhage in right temporal and left frontal lobes - continue to follow up with neurology\par Psychiatry\par depression - continue Sertraline HCl 25mg p.o.q.d. as directed - continue to follow up with psychiatrist, Dr. Thibodeaux\par \par Fasting blood work done in office \par RTO in 3 months or sooner if pt bp remains above 140/90

## 2023-04-03 NOTE — HISTORY OF PRESENT ILLNESS
[FreeTextEntry1] : fasting blood work and general follow-up [de-identified] : Patient is a 82 year old female with a past medical history as below who presents for fasting blood work and general follow-up. \par \par Patient states she is taking all medications. Notes she has been taking Hydralazine twice daily. She denies any recent episodes of lightheadedness/dizziness on Losartan Potassium, Hydralazine, and Amlodipine Besylate. GERD has been well-managed on Omeprazole. \par \par Patient notes seeing orthopedist Dr. Joshi regarding bilateral knee pain. Conservative therapy was recommended for right knee pain (s/p TKR); also s/p cortisone injection into left knee joint. She requested referral to a new orthopedist for her cortisone injection as Dr. Joshi is located far from her home. Additionally, since patient broke her right wrist after her fall she has trouble writing with her right hand. She occasional has right wrist pain that travels up her right arm.\par \par Patient notes right neck mass which may have increased in size. She denies any associated pain. She has not gone to ENT and asked for a new referral so she can make appointment.\par \par Patient did not go for colonoscopy or do FIT testing.\par \par Patient notes having Covid-19 in November. Notes still experiencing chills and low energy since then. She is fully vaccinated against Covid-19.\par \par Patient notes taking all HTN medications regularly, believes elevated blood pressure today is possibly due stress from  having family visiting and being nervous in doctor office. Reports she goes to a [facility] center weekly and has blood pressure taken, states it has been normal.

## 2023-04-19 ENCOUNTER — RX RENEWAL (OUTPATIENT)
Age: 83
End: 2023-04-19

## 2023-04-23 LAB
25(OH)D3 SERPL-MCNC: 68.8 NG/ML
ALBUMIN SERPL ELPH-MCNC: 4.3 G/DL
ALP BLD-CCNC: 131 U/L
ALT SERPL-CCNC: 11 U/L
ANION GAP SERPL CALC-SCNC: 13 MMOL/L
AST SERPL-CCNC: 17 U/L
BASOPHILS # BLD AUTO: 0.09 K/UL
BASOPHILS NFR BLD AUTO: 1.1 %
BILIRUB SERPL-MCNC: 0.4 MG/DL
BUN SERPL-MCNC: 25 MG/DL
CALCIUM SERPL-MCNC: 9.8 MG/DL
CHLORIDE SERPL-SCNC: 104 MMOL/L
CHOLEST SERPL-MCNC: 138 MG/DL
CO2 SERPL-SCNC: 23 MMOL/L
CREAT SERPL-MCNC: 1.12 MG/DL
CREAT SPEC-SCNC: 166 MG/DL
EGFR: 50 ML/MIN/1.73M2
EOSINOPHIL # BLD AUTO: 0.14 K/UL
EOSINOPHIL NFR BLD AUTO: 1.6 %
ESTIMATED AVERAGE GLUCOSE: 134 MG/DL
GLUCOSE SERPL-MCNC: 117 MG/DL
HBA1C MFR BLD HPLC: 6.3 %
HCT VFR BLD CALC: 42.9 %
HDLC SERPL-MCNC: 48 MG/DL
HGB BLD-MCNC: 13.1 G/DL
IMM GRANULOCYTES NFR BLD AUTO: 0.4 %
LDLC SERPL CALC-MCNC: 69 MG/DL
LYMPHOCYTES # BLD AUTO: 2.02 K/UL
LYMPHOCYTES NFR BLD AUTO: 23.6 %
MAN DIFF?: NORMAL
MCHC RBC-ENTMCNC: 28.9 PG
MCHC RBC-ENTMCNC: 30.5 GM/DL
MCV RBC AUTO: 94.7 FL
MICROALBUMIN 24H UR DL<=1MG/L-MCNC: 4.9 MG/DL
MICROALBUMIN/CREAT 24H UR-RTO: 30 MG/G
MONOCYTES # BLD AUTO: 0.62 K/UL
MONOCYTES NFR BLD AUTO: 7.3 %
NEUTROPHILS # BLD AUTO: 5.65 K/UL
NEUTROPHILS NFR BLD AUTO: 66 %
NONHDLC SERPL-MCNC: 90 MG/DL
PLATELET # BLD AUTO: 419 K/UL
POTASSIUM SERPL-SCNC: 3.9 MMOL/L
PROT SERPL-MCNC: 7.1 G/DL
RBC # BLD: 4.53 M/UL
RBC # FLD: 14.1 %
SODIUM SERPL-SCNC: 140 MMOL/L
T3FREE SERPL-MCNC: 2.76 PG/ML
T4 FREE SERPL-MCNC: 1.2 NG/DL
THYROGLOB AB SERPL-ACNC: <20 IU/ML
THYROPEROXIDASE AB SERPL IA-ACNC: <10 IU/ML
TRIGL SERPL-MCNC: 109 MG/DL
TSH SERPL-ACNC: 1.6 UIU/ML
WBC # FLD AUTO: 8.55 K/UL

## 2023-04-25 ENCOUNTER — NON-APPOINTMENT (OUTPATIENT)
Age: 83
End: 2023-04-25

## 2023-04-25 PROBLEM — R73.03 PRE-DIABETES: Status: ACTIVE | Noted: 2023-04-25

## 2023-04-26 RX ORDER — METFORMIN HYDROCHLORIDE 500 MG/1
500 TABLET, COATED ORAL DAILY
Qty: 90 | Refills: 0 | Status: ACTIVE | COMMUNITY
Start: 2023-04-25 | End: 1900-01-01

## 2023-05-18 ENCOUNTER — NON-APPOINTMENT (OUTPATIENT)
Age: 83
End: 2023-05-18

## 2023-05-18 ENCOUNTER — APPOINTMENT (OUTPATIENT)
Dept: CARDIOLOGY | Facility: CLINIC | Age: 83
End: 2023-05-18
Payer: MEDICARE

## 2023-05-18 VITALS
WEIGHT: 153 LBS | BODY MASS INDEX: 28.16 KG/M2 | HEIGHT: 62 IN | DIASTOLIC BLOOD PRESSURE: 67 MMHG | SYSTOLIC BLOOD PRESSURE: 167 MMHG | OXYGEN SATURATION: 100 % | HEART RATE: 58 BPM

## 2023-05-18 VITALS — DIASTOLIC BLOOD PRESSURE: 60 MMHG | SYSTOLIC BLOOD PRESSURE: 150 MMHG

## 2023-05-18 DIAGNOSIS — E78.5 HYPERLIPIDEMIA, UNSPECIFIED: ICD-10-CM

## 2023-05-18 DIAGNOSIS — R53.83 OTHER FATIGUE: ICD-10-CM

## 2023-05-18 DIAGNOSIS — I25.10 ATHEROSCLEROTIC HEART DISEASE OF NATIVE CORONARY ARTERY W/OUT ANGINA PECTORIS: ICD-10-CM

## 2023-05-18 DIAGNOSIS — I10 ESSENTIAL (PRIMARY) HYPERTENSION: ICD-10-CM

## 2023-05-18 PROCEDURE — 93000 ELECTROCARDIOGRAM COMPLETE: CPT

## 2023-05-18 PROCEDURE — 99214 OFFICE O/P EST MOD 30 MIN: CPT

## 2023-05-22 ENCOUNTER — RX RENEWAL (OUTPATIENT)
Age: 83
End: 2023-05-22

## 2023-05-22 NOTE — HISTORY OF PRESENT ILLNESS
[FreeTextEntry1] : 82-year-old woman with a history of anxiety, hypertension, hyperlipidemia, coronary calficications. \par \par She had presented to [facility] with a pneumonia. During her hospitalization she was found to have lateral T wave inversions on EKG which were worse than previously seen. She had echocardiogram that showed normal left ventricular function. Her cardiac enzymes were negative. she had a CAT scan of the chest which was also revealing for coronary artery calcifications and calcifications to the aorta and its main branches. she had nuclear stress test which was unremarkable.\par She had a syncopal episode when she was on Chlorthalidone. She was taken to  in early 2019 and her diuretic was stopped.  \par \par She was hospitalized at [facility] on 7/31/21 for  right temporal and left frontal subarachnoid hemorrhage and right wrist Fx after trip and fall outside of [place]. Admitted to ICU 7/31. Repeat CT head done on 8/1/21 reveals stable bleed. \par \par She now presents for a followup visit.\par She had COVID in November 2022.\par Since her last visit,  She is feeling some fatigue/tired since COVID. However, she does not sleep through the night very well.  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   She belongs to 2 enrichment clubs and tries to stay active. \par \par She is going on a cruise in 10days\par \par She denies any anginal symptoms. She denies any  PND, orthopnea,  edema,  syncope. She denies any blurry vision, headaches or recent stroke like symptoms. \par she's been compliant with her medications.

## 2023-05-22 NOTE — END OF VISIT
[FreeTextEntry3] : I saw and evaluated the patient and discussed the care with the NP provider above on 05/18/2023 . I agree with the findings and plan as documented in the note above. inc bp meds as above.

## 2023-05-22 NOTE — CARDIOLOGY SUMMARY
[de-identified] : SB poor R wave progression.  [de-identified] : nuclear stress test unrevealing for ischemia on 7/2020 [de-identified] : 7/29/20 that showed normal systolic LV function without any significant other findings, including no significant valvular disease. \par 1/6/22 mild to mod AI, severe LA dilatation, normal Lv function, mild diastolic dysfunction.

## 2023-05-22 NOTE — DISCUSSION/SUMMARY
[EKG obtained to assist in diagnosis and management of assessed problem(s)] : EKG obtained to assist in diagnosis and management of assessed problem(s) [FreeTextEntry1] : 82-year-old woman with a history as listed above who presents today for a followup cardiac evaluation.\par \par Yara  is doing well. She denies any anginal symptoms. Clinically she is euvolemic on exam. Her EKG did not reveal any significant ischemic changes. \par \par Her heart rate is much better off of the Coreg. Her BP is elevated today however and elevated at home. She is not orthostatic on exam.  \par Once she returns from her Cruise, she will increase hydralazine 50mg to 2 tabs in AM, 1 tab in afternoon and evening.She will continue Losartan 50mg q12, Norvasc 10 mg daily. \par She will undergo a surveillance echocardiogram to assess for changes in her cardiac structure and function.\par \par She has coronary calcifications. Her goal LDL should be less than 100.  She will continue  Crestor 40mg HS. \par She will have a carotid doppler to assess for atherosclerotic disease.\par \par She will followup with heme for her thrombocytosis. From a primary prevention standpoint, she does not need ASA given her history of ICH.  \par \par \par I will call her with the above test results once complete\par Exercise and diet counseling was performed in order to reduce her future cardiovascular risk. \par She will followup with me October, sooner if necessary.

## 2023-05-22 NOTE — PHYSICAL EXAM
[Well Groomed] : well groomed [General Appearance - In No Acute Distress] : no acute distress [Normal Conjunctiva] : the conjunctiva exhibited no abnormalities [Eyelids - No Xanthelasma] : the eyelids demonstrated no xanthelasmas [Normal Oral Mucosa] : normal oral mucosa [No Oral Pallor] : no oral pallor [No Oral Cyanosis] : no oral cyanosis [Normal Jugular Venous A Waves Present] : normal jugular venous A waves present [Normal Jugular Venous V Waves Present] : normal jugular venous V waves present [No Jugular Venous Costello A Waves] : no jugular venous costello A waves [Respiration, Rhythm And Depth] : normal respiratory rhythm and effort [Exaggerated Use Of Accessory Muscles For Inspiration] : no accessory muscle use [Auscultation Breath Sounds / Voice Sounds] : lungs were clear to auscultation bilaterally [Abdomen Soft] : soft [Abdomen Tenderness] : non-tender [Abdomen Mass (___ Cm)] : no abdominal mass palpated [Abnormal Walk] : normal gait [Gait - Sufficient For Exercise Testing] : the gait was sufficient for exercise testing [Nail Clubbing] : no clubbing of the fingernails [Cyanosis, Localized] : no localized cyanosis [Petechial Hemorrhages (___cm)] : no petechial hemorrhages [Skin Color & Pigmentation] : normal skin color and pigmentation [] : no rash [No Venous Stasis] : no venous stasis [Skin Lesions] : no skin lesions [No Skin Ulcers] : no skin ulcer [No Xanthoma] : no  xanthoma was observed [Oriented To Time, Place, And Person] : oriented to person, place, and time [Affect] : the affect was normal [Mood] : the mood was normal [No Anxiety] : not feeling anxious [Normal Rate] : normal [No Gallop] : no gallop heard [I] : a grade 1 [1+] : left 1+ [2+] : left 2+ [Well Developed] : well developed [Frail] : frail [Normal S1, S2] : normal S1, S2 [Rhythm Regular] : regular [Normal S1] : normal S1 [Normal S2] : normal S2 [II] : a grade 2 [No Pitting Edema] : no pitting edema present [No Abnormalities] : the abdominal aorta was not enlarged and no bruit was heard [Clear Lung Fields] : clear lung fields [Soft] : abdomen soft [Normal Gait] : normal gait [No Edema] : no edema [No Rash] : no rash [Moves all extremities] : moves all extremities [General Appearance - Well Developed] : well developed [General Appearance - Well Nourished] : well nourished [FreeTextEntry1] : + tenderness in left flank [Right Carotid Bruit] : no bruit heard over the right carotid [Left Carotid Bruit] : no bruit heard over the left carotid [Bruit] : no bruit heard

## 2023-07-12 ENCOUNTER — RX RENEWAL (OUTPATIENT)
Age: 83
End: 2023-07-12

## 2023-07-12 RX ORDER — ROSUVASTATIN CALCIUM 40 MG/1
40 TABLET, FILM COATED ORAL DAILY
Qty: 90 | Refills: 3 | Status: ACTIVE | COMMUNITY
Start: 2020-07-13

## 2023-07-17 ENCOUNTER — APPOINTMENT (OUTPATIENT)
Dept: CARDIOLOGY | Facility: CLINIC | Age: 83
End: 2023-07-17
Payer: MEDICARE

## 2023-07-17 PROCEDURE — 93880 EXTRACRANIAL BILAT STUDY: CPT

## 2023-07-17 PROCEDURE — 93306 TTE W/DOPPLER COMPLETE: CPT

## 2023-08-02 ENCOUNTER — APPOINTMENT (OUTPATIENT)
Dept: VASCULAR SURGERY | Facility: CLINIC | Age: 83
End: 2023-08-02
Payer: MEDICARE

## 2023-08-02 VITALS
WEIGHT: 154.98 LBS | BODY MASS INDEX: 28.52 KG/M2 | SYSTOLIC BLOOD PRESSURE: 154 MMHG | OXYGEN SATURATION: 95 % | DIASTOLIC BLOOD PRESSURE: 66 MMHG | HEART RATE: 70 BPM | HEIGHT: 62 IN

## 2023-08-02 DIAGNOSIS — R13.10 DYSPHAGIA, UNSPECIFIED: ICD-10-CM

## 2023-08-02 DIAGNOSIS — I65.23 OCCLUSION AND STENOSIS OF BILATERAL CAROTID ARTERIES: ICD-10-CM

## 2023-08-02 PROCEDURE — 99213 OFFICE O/P EST LOW 20 MIN: CPT

## 2023-08-02 NOTE — HISTORY OF PRESENT ILLNESS
[FreeTextEntry1] : 83 yo F sent for evaluation of asymptomatic carotid disease. She thinks US was ordered due to "lumps" she had on the R side of her neck. She has hx of falls and suffered a  SAH in 2021. She is not on any AC or anti platelet therapy. She denies stroke like symptoms but has been noticing having some trouble swallowing and understanding the meaning of words lately.

## 2023-08-02 NOTE — ASSESSMENT
[FreeTextEntry1] : Asymptomatic carotid stenosis.  Dysphagia and trouble understanding meaning of words may need further neuro workup.

## 2023-08-02 NOTE — PLAN
[TextEntry] : I have reviewed tests with patient. I have spent a total of 20 minutes with patient. Will continue to monitor with US for now. 12 m follow up. Continue to take statins. Consider ASA. Recommended her to have neuro eval.

## 2023-08-02 NOTE — PHYSICAL EXAM
[2+] : left 2+ [Ankle Swelling (On Exam)] : not present [Varicose Veins Of Lower Extremities] : not present [] : not present [de-identified] : no motor sens deficits.  [de-identified] : R neck 1 cm subcutaneous mobile masses x 3

## 2024-03-15 RX ORDER — CHOLECALCIFEROL (VITAMIN D3) 25 MCG
25 TABLET ORAL DAILY
Qty: 90 | Refills: 0 | Status: ACTIVE | COMMUNITY
Start: 1900-01-01 | End: 1900-01-01

## 2024-03-21 NOTE — HISTORY OF PRESENT ILLNESS
Behavioral Health Belongings     Informed of Valuables Policy  Yes No         Item                  Qty   Clothing:     Home   Bin   Security Lock Up Room Returned   Date/Initials    Pants         2 Shirts: 1 LS and one pullover x        Shoes / boots with strings         Coats / jackets with strings         Other                               Behavioral Health Belongings List                     [id number]                          Qty       Luggage / Bags:   Home   Bin   Security Lock Up Room Returned Date/Initials    Suitcases / Luggage with straps         Gym Bags / purses with long straps         Backpack         Plastic bags (including Ziploc)         Other                     Qty   Grooming / Hygiene Items:   Home   Bin   Security Lock Up Room Returned Date/Initials   1 MK lip stuff    x     Mouthwash         Items in glass bottles (nail polish, makeup, etc.)         Aerosol cans (hair spray, mousse, etc         Electric razor         Disposable razor         Shaving cream         Dental floss         Metal files, nail clippers, tweezers, etc.         Hairdryer         Curling iron         Other                                                             Behavioral Health Belongings List                            [id number]                               Qty   Electronic Devices:   Home   Bin   Security Lock Up Room Returned Date/Initials    Cell phone, iPhone, Blackberry, etc.         iPod / Media player         Chargers / cords         Headphones         Other                              Qty   Smoking Items:   Home   Bin   Security Lock Up Room Returned Date/Initials    Cigarettes, cigars         Lighters         Matches         Loose tobacco for rolling cigarettes, Chew, Pipe tobacco         Tobacco pipes         Rolling Papers         Other                                                  Behavioral Health Belongings List                          [id number]                                                    .                                .            Qty   Cards, Keys, Valuables:   Home   Bin   Security Lock Up Room Returned Date/Initials    Keys         Wallet / Purse         Cash (Encourage  to home or Loss Prevention)         Credit Cards (Encourage home or Loss Prevention)         Checkbook (Encourage home or Loss Prevention)         Jewelry - Specify         Other                              Qty   Other / Various Sharps:   Home   Bin   Security Lock Up Room Returned Date/Initials    Brace         Cane         Contacts         Dentures U / L         Hearing Aide L / R         Partials / Retainer         Prothesis         Scooter         Walker         W/C         Medication         Other                                                                             Behavioral Health Belongings List                            [id number]                           Qty   Other Misc Items   Home   Bin   Security Lock Up Room Returned Date/Initials                                                                    Signature at Admission ___________________________________________Date: _________    Signature at Update ______________________________________________Date: _________    Signature at Discharge ____________________________________________Date: _________                                                                    Behavioral Health Belongings List                       [id number]            [FreeTextEntry1] : fasting blood work and general follow-up  [de-identified] : Patient is a 79 year old female with a past medical history as below who presents for fasting blood work and general follow-up. Patient states she is taking all medications as prescribed and denies any adverse reactions or side effects. She denies lightheadedness or dizziness on Losartan Potassium, Hydralazine, and Amlodipine Besylate. She denies diffuse arthralgias or myalgias on Rosuvastatin Calcium. GERD is well-managed on Omeprazole. Patient notes constantly feeling cold despite dressing warmly. She notes becoming fatigued easily after exerting herself. Patient requests vitamin B12 level be checked with blood work today per psychiatrist, Dr. Thibodeaux.

## 2024-05-23 ENCOUNTER — NON-APPOINTMENT (OUTPATIENT)
Age: 84
End: 2024-05-23

## 2024-06-04 NOTE — END OF VISIT
[>50% of Time Spent on Counseling for ____] : Greater than 50% of the encounter time was spent on counseling for [unfilled] [Time Spent: ___ minutes] : I have spent [unfilled] minutes of face to face time with the patient [Negative] : Heme/Lymph

## 2024-08-16 ENCOUNTER — APPOINTMENT (OUTPATIENT)
Dept: CARDIOLOGY | Facility: CLINIC | Age: 84
End: 2024-08-16
Payer: MEDICARE

## 2024-08-16 ENCOUNTER — NON-APPOINTMENT (OUTPATIENT)
Age: 84
End: 2024-08-16

## 2024-08-16 VITALS
DIASTOLIC BLOOD PRESSURE: 79 MMHG | HEIGHT: 62 IN | HEART RATE: 70 BPM | OXYGEN SATURATION: 96 % | BODY MASS INDEX: 28.71 KG/M2 | SYSTOLIC BLOOD PRESSURE: 129 MMHG | WEIGHT: 156 LBS

## 2024-08-16 DIAGNOSIS — R94.31 ABNORMAL ELECTROCARDIOGRAM [ECG] [EKG]: ICD-10-CM

## 2024-08-16 DIAGNOSIS — E78.5 HYPERLIPIDEMIA, UNSPECIFIED: ICD-10-CM

## 2024-08-16 DIAGNOSIS — I65.29 OCCLUSION AND STENOSIS OF UNSPECIFIED CAROTID ARTERY: ICD-10-CM

## 2024-08-16 DIAGNOSIS — I25.10 ATHEROSCLEROTIC HEART DISEASE OF NATIVE CORONARY ARTERY W/OUT ANGINA PECTORIS: ICD-10-CM

## 2024-08-16 DIAGNOSIS — I10 ESSENTIAL (PRIMARY) HYPERTENSION: ICD-10-CM

## 2024-08-16 PROCEDURE — G2211 COMPLEX E/M VISIT ADD ON: CPT

## 2024-08-16 PROCEDURE — 93000 ELECTROCARDIOGRAM COMPLETE: CPT

## 2024-08-16 PROCEDURE — 99214 OFFICE O/P EST MOD 30 MIN: CPT

## 2024-08-20 NOTE — REVIEW OF SYSTEMS
[Feeling Fatigued] : feeling fatigued [Negative] : Heme/Lymph [FreeTextEntry2] : see HPI [FreeTextEntry9] : knee pain

## 2024-08-20 NOTE — END OF VISIT
[FreeTextEntry3] : I saw and evaluated the patient and discussed the care with the NP provider above on 08/16/2024 . I agree with the findings and plan as documented in the note above. She will undergo a pharmacological nuclear stress test to assess for underlying obstructive CAD. She is compliant with her medications.

## 2024-08-20 NOTE — DISCUSSION/SUMMARY
[EKG obtained to assist in diagnosis and management of assessed problem(s)] : EKG obtained to assist in diagnosis and management of assessed problem(s) [FreeTextEntry1] : 83-year-old woman with a history as listed above who presents today for a followup cardiac evaluation.  Yara is still reporting global symptoms of fatigue. She is primarily sedentary as far as activity but does partake in enrichment programs. ECG illustrates sinus rhythm with PRWP, abnormalities of anterior and inf leads that are unchanged from prior.  We are unable to assess exertional tolerance given her sedentary nature. I have advised she undergo pharmacologic stress test as precaution to assess for evidence of obstructive CAD.  Echocardiogram demonstrates normal LVEF of 64%, mild-mod TR and mild-mod AR. Her heart rate is much better off of the Coreg. Her B/P is controlled, she will continue hydralazine 50mg BID, Losartan 50mg q12, Norvasc 10 mg daily.   She has coronary calcifications. Her goal LDL should be less than 100.  She will continue  Crestor 40mg HS.  She will update her carotid doppler to assess for worsening atherosclerotic disease. Previously note mod-sever of RT and mod disease on L  She will followup with heme for her thrombocytosis. From a primary prevention standpoint, we can hold off on ASA , also given her history of ICH.    I will call her with the above test results once complete Exercise and diet counseling was performed in order to reduce her future cardiovascular risk.  She will resume sertraline as originally dosed.  She will follow up with me 6 months, sooner if necessary.

## 2024-08-20 NOTE — CARDIOLOGY SUMMARY
[de-identified] : SR poor R wave progression.  ?old apical/inf inf  [de-identified] : nuclear stress test unrevealing for ischemia on 7/2020 [de-identified] : 7/29/20 that showed normal systolic LV function without any significant other findings, including no significant valvular disease.  1/6/22 mild to mod AI, severe LA dilatation, normal Lv function, mild diastolic dysfunction.  7/2023 LVEF 64%, concentric LVH, grade 1 diastolic, mild-mod TR, mild-mod AR

## 2024-08-20 NOTE — HISTORY OF PRESENT ILLNESS
[FreeTextEntry1] : 83-year-old woman with a history of anxiety, hypertension, hyperlipidemia, coronary calficications.   She had presented to [facility] with a pneumonia. During her hospitalization she was found to have lateral T wave inversions on EKG which were worse than previously seen. She had echocardiogram that showed normal left ventricular function. Her cardiac enzymes were negative. she had a CAT scan of the chest which was also revealing for coronary artery calcifications and calcifications to the aorta and its main branches. she had nuclear stress test which was unremarkable. She had a syncopal episode when she was on Chlorthalidone. She was taken to  in early 2019 and her diuretic was stopped.    She was hospitalized at [facility] on 7/31/21 for  right temporal and left frontal subarachnoid hemorrhage and right wrist Fx after trip and fall outside of [place]. Admitted to ICU 7/31. Repeat CT head done on 8/1/21 reveals stable bleed.   She now presents for a followup visit. She had COVID in November 2022 and again 2 months ago.  She has been very fatigued more recently particularly after COVID.  However, she does not sleep through the night very well.  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  She belongs to 2 enrichment clubs and tries to stay active.   She denies any anginal symptoms. She denies any  PND, orthopnea,  edema,  syncope. She denies any blurry vision, headaches or recent stroke like symptoms.  she's been compliant with her medications, though She stopped sertraline due to running out and she felt that she did not need to renew.

## 2024-08-20 NOTE — HISTORY OF PRESENT ILLNESS
[FreeTextEntry1] : 83-year-old woman with a history of anxiety, hypertension, hyperlipidemia, coronary calficications.   She had presented to [facility] with a pneumonia. During her hospitalization she was found to have lateral T wave inversions on EKG which were worse than previously seen. She had echocardiogram that showed normal left ventricular function. Her cardiac enzymes were negative. she had a CAT scan of the chest which was also revealing for coronary artery calcifications and calcifications to the aorta and its main branches. she had nuclear stress test which was unremarkable. She had a syncopal episode when she was on Chlorthalidone. She was taken to  in early 2019 and her diuretic was stopped.    She was hospitalized at [facility] on 7/31/21 for  right temporal and left frontal subarachnoid hemorrhage and right wrist Fx after trip and fall outside of [place]. Admitted to ICU 7/31. Repeat CT head done on 8/1/21 reveals stable bleed.   She now presents for a followup visit. She had COVID in November 2022 and again 2 months ago.  She has been very fatigued more recently particularly after COVID.  However, she does not sleep through the night very well.  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  She belongs to 2 enrichment clubs and tries to stay active.   She denies any anginal symptoms. She denies any  PND, orthopnea,  edema,  syncope. She denies any blurry vision, headaches or recent stroke like symptoms.  she's been compliant with her medications, though She stopped sertraline due to running out and she felt that she did not need to renew.

## 2024-08-20 NOTE — CARDIOLOGY SUMMARY
[de-identified] : SR poor R wave progression.  ?old apical/inf inf  [de-identified] : nuclear stress test unrevealing for ischemia on 7/2020 [de-identified] : 7/29/20 that showed normal systolic LV function without any significant other findings, including no significant valvular disease.  1/6/22 mild to mod AI, severe LA dilatation, normal Lv function, mild diastolic dysfunction.  7/2023 LVEF 64%, concentric LVH, grade 1 diastolic, mild-mod TR, mild-mod AR

## 2024-08-20 NOTE — PHYSICAL EXAM
[Well Developed] : well developed [Frail] : frail [Normal S1, S2] : normal S1, S2 [No Abnormalities] : the abdominal aorta was not enlarged and no bruit was heard [Clear Lung Fields] : clear lung fields [Soft] : abdomen soft [Normal Gait] : normal gait [No Edema] : no edema [No Rash] : no rash [Moves all extremities] : moves all extremities [General Appearance - Well Developed] : well developed [Well Groomed] : well groomed [General Appearance - Well Nourished] : well nourished [General Appearance - In No Acute Distress] : no acute distress [Normal Conjunctiva] : the conjunctiva exhibited no abnormalities [Eyelids - No Xanthelasma] : the eyelids demonstrated no xanthelasmas [Normal Oral Mucosa] : normal oral mucosa [No Oral Pallor] : no oral pallor [No Oral Cyanosis] : no oral cyanosis [Normal Jugular Venous A Waves Present] : normal jugular venous A waves present [Normal Jugular Venous V Waves Present] : normal jugular venous V waves present [No Jugular Venous Costello A Waves] : no jugular venous costello A waves [Respiration, Rhythm And Depth] : normal respiratory rhythm and effort [Exaggerated Use Of Accessory Muscles For Inspiration] : no accessory muscle use [Auscultation Breath Sounds / Voice Sounds] : lungs were clear to auscultation bilaterally [Abdomen Soft] : soft [Abdomen Tenderness] : non-tender [Abdomen Mass (___ Cm)] : no abdominal mass palpated [Abnormal Walk] : normal gait [Gait - Sufficient For Exercise Testing] : the gait was sufficient for exercise testing [Nail Clubbing] : no clubbing of the fingernails [Cyanosis, Localized] : no localized cyanosis [Petechial Hemorrhages (___cm)] : no petechial hemorrhages [Skin Color & Pigmentation] : normal skin color and pigmentation [] : no rash [No Venous Stasis] : no venous stasis [Skin Lesions] : no skin lesions [No Skin Ulcers] : no skin ulcer [No Xanthoma] : no  xanthoma was observed [Oriented To Time, Place, And Person] : oriented to person, place, and time [Affect] : the affect was normal [Mood] : the mood was normal [No Anxiety] : not feeling anxious [Normal Rate] : normal [Rhythm Regular] : regular [Normal S1] : normal S1 [Normal S2] : normal S2 [No Gallop] : no gallop heard [II] : a grade 2 [I] : a grade 1 [1+] : left 1+ [2+] : left 2+ [No Pitting Edema] : no pitting edema present [No Carotid Bruit] : no carotid bruit [FreeTextEntry1] : + tenderness in left flank [Right Carotid Bruit] : no bruit heard over the right carotid [Left Carotid Bruit] : no bruit heard over the left carotid [Bruit] : no bruit heard

## 2024-12-16 NOTE — ED ADULT NURSE NOTE - NS ED NURSE DC INFO COMPLEXITY
[facility] provides services at a reduced cost to those who are determined to be eligible through [facility]’s financial assistance program. Information regarding [facility]’s financial assistance program can be found by going to [email or web address] or by calling [phone].
Verbalized Understanding

## 2025-01-02 NOTE — ED PROVIDER NOTE - RELIEVING FACTORS
none
General Sunscreen Counseling: I recommended a broad spectrum sunscreen with a SPF of 30 or higher.  I explained that SPF 30 sunscreens block approximately 97 percent of the sun's harmful rays.  Sunscreens should be applied at least 15 minutes prior to expected sun exposure and then every 2 hours after that as long as sun exposure continues. If swimming or exercising sunscreen should be reapplied every 45 minutes to an hour after getting wet or sweating.  One ounce, or the equivalent of a shot glass full of sunscreen, is adequate to protect the skin not covered by a bathing suit. I also recommended a lip balm with a sunscreen as well. Sun protective clothing can be used in lieu of sunscreen but must be worn the entire time you are exposed to the sun's rays.
Detail Level: Detailed